# Patient Record
Sex: FEMALE | Race: WHITE | NOT HISPANIC OR LATINO | Employment: FULL TIME | URBAN - METROPOLITAN AREA
[De-identification: names, ages, dates, MRNs, and addresses within clinical notes are randomized per-mention and may not be internally consistent; named-entity substitution may affect disease eponyms.]

---

## 2018-01-22 ENCOUNTER — OFFICE VISIT (OUTPATIENT)
Dept: FAMILY MEDICINE | Facility: CLINIC | Age: 53
End: 2018-01-22
Payer: COMMERCIAL

## 2018-01-22 VITALS
OXYGEN SATURATION: 98 % | DIASTOLIC BLOOD PRESSURE: 90 MMHG | HEART RATE: 84 BPM | SYSTOLIC BLOOD PRESSURE: 142 MMHG | BODY MASS INDEX: 43.39 KG/M2 | WEIGHT: 270 LBS | HEIGHT: 66 IN

## 2018-01-22 DIAGNOSIS — L71.9 ROSACEA: ICD-10-CM

## 2018-01-22 DIAGNOSIS — R09.89 BRUIT OF RIGHT CAROTID ARTERY: ICD-10-CM

## 2018-01-22 DIAGNOSIS — M17.0 PRIMARY OSTEOARTHRITIS OF BOTH KNEES: Primary | ICD-10-CM

## 2018-01-22 DIAGNOSIS — I10 ESSENTIAL HYPERTENSION: ICD-10-CM

## 2018-01-22 PROCEDURE — 99203 OFFICE O/P NEW LOW 30 MIN: CPT | Mod: ,,, | Performed by: NURSE PRACTITIONER

## 2018-01-22 RX ORDER — NAPROXEN 500 MG/1
500 TABLET ORAL 3 TIMES DAILY PRN
COMMUNITY
End: 2018-06-20

## 2018-01-22 RX ORDER — DICLOFENAC SODIUM 10 MG/G
4 GEL TOPICAL 4 TIMES DAILY
Qty: 100 G | Refills: 0 | Status: SHIPPED | OUTPATIENT
Start: 2018-01-22 | End: 2023-03-21

## 2018-01-22 RX ORDER — METRONIDAZOLE 7.5 MG/G
GEL TOPICAL 2 TIMES DAILY
Qty: 1 TUBE | Refills: 0 | Status: CANCELLED | OUTPATIENT
Start: 2018-01-22

## 2018-01-22 RX ORDER — LISINOPRIL 10 MG/1
10 TABLET ORAL NIGHTLY
Qty: 30 TABLET | Refills: 1 | Status: SHIPPED | OUTPATIENT
Start: 2018-01-22 | End: 2018-08-06 | Stop reason: SDUPTHER

## 2018-01-22 RX ORDER — METRONIDAZOLE 10 MG/G
GEL TOPICAL DAILY
Qty: 1 TUBE | Refills: 0 | Status: SHIPPED | OUTPATIENT
Start: 2018-01-22 | End: 2023-03-21

## 2018-01-22 NOTE — PROGRESS NOTES
SUBJECTIVE:   HPI:  Knee Pain (wants referral to ortho, having knee and leg problems.)    Chronic bilat knee pain related to osteoarthritis. Previously treated per Dr Olson.   BP elevated, patient states it has been elevated at home in the past.   Chronic rosacea: not having a flare up. Would like medication to help with daily management.       Knee Pain    There was no injury mechanism. The pain is present in the right knee and left knee. The quality of the pain is described as aching. The pain has been constant since onset.   Hypertension   This is a new problem. The current episode started 1 to 4 weeks ago. The problem is unchanged. Pertinent negatives include no chest pain, palpitations or shortness of breath.       (Not in a hospital admission)  Review of patient's allergies indicates:  No Known Allergies  No current outpatient prescriptions on file prior to visit.     No current facility-administered medications on file prior to visit.      History reviewed. No pertinent past medical history.  History reviewed. No pertinent surgical history.  Family History   Problem Relation Age of Onset    Cancer Mother      breast    Arthritis Mother     Birth defects Mother     Cancer Father      lung    Arthritis Maternal Grandmother      Social History   Substance Use Topics    Smoking status: Never Smoker    Smokeless tobacco: Never Used    Alcohol use No      The patient has no Health Maintenance topics of status Not Due    There is no immunization history on file for this patient.    Review of Systems   Constitutional: Negative for appetite change, chills, diaphoresis and unexpected weight change.   HENT: Negative for ear discharge, hearing loss, trouble swallowing and voice change.    Eyes: Negative for photophobia and pain.   Respiratory: Negative for cough, chest tightness, shortness of breath and stridor.    Cardiovascular: Negative for chest pain, palpitations and leg swelling.   Gastrointestinal:  "Negative for blood in stool and vomiting.   Endocrine: Negative for cold intolerance and heat intolerance.   Genitourinary: Negative for difficulty urinating and flank pain.   Musculoskeletal: Positive for arthralgias. Negative for joint swelling and neck stiffness.   Skin: Negative for pallor.   Neurological: Negative for speech difficulty.   Hematological: Does not bruise/bleed easily.   Psychiatric/Behavioral: Negative for confusion.      OBJECTIVE:      Vitals:    01/22/18 1401 01/22/18 1510   BP: (!) 174/102 (!) 142/90   Pulse: 84    SpO2: 98%    Weight: 122.5 kg (270 lb)    Height: 5' 6" (1.676 m)      Physical Exam   Constitutional: She is oriented to person, place, and time. She appears well-developed and well-nourished. She is cooperative. No distress.   HENT:   Head: Normocephalic and atraumatic.   Right Ear: Tympanic membrane normal.   Left Ear: Tympanic membrane normal.   Nose: Nose normal.   Mouth/Throat: Uvula is midline, oropharynx is clear and moist and mucous membranes are normal.   Eyes: Conjunctivae, EOM and lids are normal. Pupils are equal, round, and reactive to light. Right pupil is round and reactive. Left pupil is round and reactive.   Neck: Trachea normal and normal range of motion. Neck supple. No JVD present. Carotid bruit is present (right carotid bruit). No thyromegaly present.   Cardiovascular: Normal rate, regular rhythm, normal heart sounds and intact distal pulses.    Pulmonary/Chest: Effort normal and breath sounds normal. No tachypnea. No respiratory distress. She has no wheezes. She has no rhonchi. She has no rales.   Abdominal: Soft. Bowel sounds are normal. There is no tenderness.   Musculoskeletal: Normal range of motion.   Bilat knee crepitus noted, no effusion   Lymphadenopathy:     She has no cervical adenopathy.   Neurological: She is alert and oriented to person, place, and time. She has normal strength.   Skin: Skin is warm and dry. No rash noted.   Forehead with non " inflamed acne noted. Minimal erythema noted to nose and bilat cheeks   Psychiatric: She has a normal mood and affect. Her speech is normal and behavior is normal.   Nursing note and vitals reviewed.     Assessment:       1. Primary osteoarthritis of both knees    2. Essential hypertension    3. Bruit of right carotid artery    4. Rosacea        Plan:       Primary osteoarthritis of both knees  -     Ambulatory referral to Orthopedics  -     diclofenac sodium (VOLTAREN) 1 % Gel; Apply 4 g topically 4 (four) times daily.  Dispense: 100 g; Refill: 0    Essential hypertension  -     lisinopril 10 MG tablet; Take 1 tablet (10 mg total) by mouth every evening.  Dispense: 30 tablet; Refill: 1    Bruit of right carotid artery  -     Cardiology Lab Carotid US Bilateral; Future    Rosacea  -     metronidazole 1% (METROGEL) 1 % Gel; Apply topically once daily.  Dispense: 1 Tube; Refill: 0        Follow-up in about 4 weeks (around 2/19/2018) for htn.      1/22/2018 KEVIN Mcdonald, FNP

## 2018-01-22 NOTE — PATIENT INSTRUCTIONS
Controlling High Blood Pressure  High blood pressure (hypertension) is often called the silent killer. This is because many people who have it dont know it. High blood pressure is defined as 140/90 mm Hg or higher. Know your blood pressure and remember to check it regularly. Doing so can save your life. Here are some things you can do to help control your blood pressure.    Choose heart-healthy foods  · Select low-salt, low-fat foods. Limit sodium intake to 2,400 mg per day or the amount suggested by your healthcare provider.  · Limit canned, dried, cured, packaged, and fast foods. These can contain a lot of salt.  · Eat 8 to 10 servings of fruits and vegetables every day.  · Choose lean meats, fish, or chicken.  · Eat whole-grain pasta, brown rice, and beans.  · Eat 2 to 3 servings of low-fat or fat-free dairy products.  · Ask your doctor about the DASH eating plan. This plan helps reduce blood pressure.  · When you go to a restaurant, ask that your meal be prepared with no added salt.  Maintain a healthy weight  · Ask your healthcare provider how many calories to eat a day. Then stick to that number.  · Ask your healthcare provider what weight range is healthiest for you. If you are overweight, a weight loss of only 3% to 5% of your body weight can help lower blood pressure. Generally, a good weight loss goal is to lose 10% of your body weight in a year.  · Limit snacks and sweets.  · Get regular exercise.  Get up and get active  · Choose activities you enjoy. Find ones you can do with friends or family. This includes bicycling, dancing, walking, and jogging.  · Park farther away from building entrances.  · Use stairs instead of the elevator.  · When you can, walk or bike instead of driving.  · [place] leaves, garden, or do household repairs.  · Be active at a moderate to vigorous level of physical activity for at least 40 minutes for a minimum of 3 to 4 days a week.   Manage stress  · Make time to relax and enjoy  life. Find time to laugh.  · Communicate your concerns with your loved ones and your healthcare provider.  · Visit with family and friends, and keep up with hobbies.  Limit alcohol and quit smoking  · Men should have no more than 2 drinks per day.  · Women should have no more than 1 drink per day.  · Talk with your healthcare provider about quitting smoking. Smoking significantly increases your risk for heart disease and stroke. Ask your healthcare provider about community smoking cessation programs and other options.  Medicines  If lifestyle changes arent enough, your healthcare provider may prescribe high blood pressure medicine. Take all medicines as prescribed. If you have any questions about your medicines, ask your healthcare provider before stopping or changing them.   Date Last Reviewed: 4/27/2016  © 8248-1549 [employer]. [address] [place] [address], [place] [address]. All rights reserved. This information is not intended as a substitute for professional medical care. Always follow your healthcare professional's instructions.

## 2018-02-08 ENCOUNTER — TELEPHONE (OUTPATIENT)
Dept: FAMILY MEDICINE | Facility: CLINIC | Age: 53
End: 2018-02-08

## 2018-02-08 NOTE — TELEPHONE ENCOUNTER
----- Message from Prashanth Cabrera NP sent at 2/8/2018  7:56 AM CST -----  Neg carotid u/s  ----- Message -----  From: Yennifer Apodaca  Sent: 2/8/2018   7:49 AM  To: Prashanth Cabrera NP    Carotid doppler 2/7/18

## 2018-02-12 ENCOUNTER — TELEPHONE (OUTPATIENT)
Dept: FAMILY MEDICINE | Facility: CLINIC | Age: 53
End: 2018-02-12

## 2018-04-04 ENCOUNTER — ANESTHESIA EVENT (OUTPATIENT)
Dept: SURGERY | Facility: HOSPITAL | Age: 53
DRG: 470 | End: 2018-04-04
Payer: COMMERCIAL

## 2018-04-05 ENCOUNTER — HOSPITAL ENCOUNTER (OUTPATIENT)
Dept: RADIOLOGY | Facility: HOSPITAL | Age: 53
Discharge: HOME | End: 2018-04-05
Attending: ORTHOPAEDIC SURGERY
Payer: COMMERCIAL

## 2018-04-05 ENCOUNTER — HOSPITAL ENCOUNTER (OUTPATIENT)
Dept: PREADMISSION TESTING | Facility: HOSPITAL | Age: 53
Discharge: HOME | End: 2018-04-05
Attending: ORTHOPAEDIC SURGERY
Payer: COMMERCIAL

## 2018-04-05 VITALS — BODY MASS INDEX: 43.82 KG/M2 | HEIGHT: 65 IN | WEIGHT: 263 LBS

## 2018-04-05 DIAGNOSIS — Z01.818 PRE-OP EXAM: ICD-10-CM

## 2018-04-05 DIAGNOSIS — M17.12 OSTEOARTHRITIS OF LEFT KNEE, UNSPECIFIED OSTEOARTHRITIS TYPE: Primary | ICD-10-CM

## 2018-04-05 LAB
ABO + RH BLD: NORMAL
ALBUMIN SERPL BCP-MCNC: 3.9 G/DL
ALP SERPL-CCNC: 37 U/L
ALT SERPL W/O P-5'-P-CCNC: 11 U/L
ANION GAP SERPL CALC-SCNC: 9 MMOL/L
APTT BLDCRRT: 24.5 SEC
AST SERPL-CCNC: 14 U/L
BACTERIA #/AREA URNS HPF: ABNORMAL /HPF
BASOPHILS # BLD AUTO: 0.1 K/UL
BASOPHILS NFR BLD: 0.9 %
BILIRUB SERPL-MCNC: 0.3 MG/DL
BILIRUB UR QL STRIP: NEGATIVE
BLD GP AB SCN CELLS X3 SERPL QL: NORMAL
BUN SERPL-MCNC: 14 MG/DL
CALCIUM SERPL-MCNC: 9.8 MG/DL
CHLORIDE SERPL-SCNC: 106 MMOL/L
CLARITY UR: CLEAR
CO2 SERPL-SCNC: 25 MMOL/L
COLOR UR: YELLOW
CREAT SERPL-MCNC: 0.9 MG/DL
DIFFERENTIAL METHOD: ABNORMAL
EOSINOPHIL # BLD AUTO: 0.2 K/UL
EOSINOPHIL NFR BLD: 2 %
ERYTHROCYTE [DISTWIDTH] IN BLOOD BY AUTOMATED COUNT: 14.3 %
EST. GFR  (AFRICAN AMERICAN): >60 ML/MIN/1.73 M^2
EST. GFR  (NON AFRICAN AMERICAN): >60 ML/MIN/1.73 M^2
GLUCOSE SERPL-MCNC: 101 MG/DL
GLUCOSE UR QL STRIP: NEGATIVE
HCT VFR BLD AUTO: 44.2 %
HGB BLD-MCNC: 14.6 G/DL
HGB UR QL STRIP: ABNORMAL
KETONES UR QL STRIP: NEGATIVE
LEUKOCYTE ESTERASE UR QL STRIP: ABNORMAL
LYMPHOCYTES # BLD AUTO: 3.5 K/UL
LYMPHOCYTES NFR BLD: 31.1 %
MCH RBC QN AUTO: 26.9 PG
MCHC RBC AUTO-ENTMCNC: 32.9 G/DL
MCV RBC AUTO: 82 FL
MICROSCOPIC COMMENT: ABNORMAL
MONOCYTES # BLD AUTO: 0.7 K/UL
MONOCYTES NFR BLD: 5.9 %
NEUTROPHILS # BLD AUTO: 6.7 K/UL
NEUTROPHILS NFR BLD: 60.1 %
NITRITE UR QL STRIP: NEGATIVE
PH UR STRIP: 6 [PH] (ref 5–8)
PLATELET # BLD AUTO: 384 K/UL
PMV BLD AUTO: 7.9 FL
POTASSIUM SERPL-SCNC: 3.8 MMOL/L
PROT SERPL-MCNC: 7.7 G/DL
PROT UR QL STRIP: ABNORMAL
RBC # BLD AUTO: 5.41 M/UL
RBC #/AREA URNS HPF: 5 /HPF (ref 0–4)
SODIUM SERPL-SCNC: 140 MMOL/L
SP GR UR STRIP: >=1.03 (ref 1–1.03)
SQUAMOUS #/AREA URNS HPF: 8 /HPF
URN SPEC COLLECT METH UR: ABNORMAL
UROBILINOGEN UR STRIP-ACNC: NEGATIVE EU/DL
WBC # BLD AUTO: 11.1 K/UL
WBC #/AREA URNS HPF: 75 /HPF (ref 0–5)

## 2018-04-05 PROCEDURE — 71046 X-RAY EXAM CHEST 2 VIEWS: CPT | Mod: 26,,, | Performed by: RADIOLOGY

## 2018-04-05 PROCEDURE — 86901 BLOOD TYPING SEROLOGIC RH(D): CPT

## 2018-04-05 PROCEDURE — 93010 ELECTROCARDIOGRAM REPORT: CPT | Mod: ,,, | Performed by: INTERNAL MEDICINE

## 2018-04-05 PROCEDURE — 99900103 DSU ONLY-NO CHARGE-INITIAL HR (STAT)

## 2018-04-05 PROCEDURE — 99900104 DSU ONLY-NO CHARGE-EA ADD'L HR (STAT)

## 2018-04-05 PROCEDURE — 36415 COLL VENOUS BLD VENIPUNCTURE: CPT

## 2018-04-05 PROCEDURE — 85025 COMPLETE CBC W/AUTO DIFF WBC: CPT

## 2018-04-05 PROCEDURE — 85730 THROMBOPLASTIN TIME PARTIAL: CPT

## 2018-04-05 PROCEDURE — 73562 X-RAY EXAM OF KNEE 3: CPT | Mod: 26,LT,, | Performed by: RADIOLOGY

## 2018-04-05 PROCEDURE — 81000 URINALYSIS NONAUTO W/SCOPE: CPT

## 2018-04-05 PROCEDURE — 87081 CULTURE SCREEN ONLY: CPT

## 2018-04-05 PROCEDURE — 80053 COMPREHEN METABOLIC PANEL: CPT

## 2018-04-05 PROCEDURE — 93005 ELECTROCARDIOGRAM TRACING: CPT

## 2018-04-05 PROCEDURE — 73562 X-RAY EXAM OF KNEE 3: CPT | Mod: TC,FY,LT

## 2018-04-05 PROCEDURE — 71046 X-RAY EXAM CHEST 2 VIEWS: CPT | Mod: TC,FY

## 2018-04-05 NOTE — PRE ADMISSION SCREENING
"               [facility] Risk Assessment Scale    Patient Name: Carola Blanco  YOB: 1965  MRN: [id number]            RIsk Factor Measure Recommendation Patient Data Scale/Score   BMI >40 Reconsider surgery, weight loss   Estimated body mass index is 43.77 kg/m² as calculated from the following:    Height as of this encounter: 5' 5" (1.651 m).    Weight as of this encounter: 119.3 kg (263 lb).   [] 0 = 1 - 24.9  [] 1 = 25-29.9  [] 2 = 30-34.9  [] 3 = 35-39.9  [x] 4 = 40-44.9  [] 5 = 45-99.9   Hemoglobin AIC (if applicable) >9 Delay surgery until DM under control  Refer for:  · Nutrition Therapy  · Exercise   · Medication    No results found for: LABA1C, HGBA1C    Lab Results   Component Value Date     04/05/2018      [] 0 = 4.0-5.6  [] 1 = 5.7-6.4  [] 2 = 6.5-6.9  [] 3 = 7.0-7.9  [] 4 = 8.0-8.9  [] 5 = 9.0-12   Hemoglobin (Anemia) <9 Delay surgery   Correct anemia Lab Results   Component Value Date    HGB 14.6 04/05/2018    [] 20 - <9.0                    Albumin <3 Delay surgery &Workup Lab Results   Component Value Date    ALBUMIN 3.9 04/05/2018    [] 20 - <3.0   Smoking Cessation >4 Weeks Delay Surgery  Refer to OP Cessation Class    Never Smoker [] 20 - current smoker                                _____ PPD                    Hx of MI, PE, Arrhythmia, CVA, DVT <30 Days Delay Surgery    N/A [] 20      Infection Variable Delay surgery and re-evaluate   N/A [] 20 - recent/current infection     Depression (PHQ) >10 out of 27 Delay Surgery and re-evaluate  Medication  Counseling              [x] 0     []1     []2     []3      []4      [] 5                    (1-4)      (5-9)  (10-14)  (15-19)   (20-27)     Memory Impairment & Memory loss (Mini-Cog Screening Tool) Advanced dementia and/or Parkinson's Reconsider surgery     [x] 0     []1     []2     []3     []4     [] 5     Physical Conditioning (Modified AM-PAC Per Physical Therapy at [facility]) Unable to ambulate on day of surgery Delay surgery and " re-evaluate  Pre-Rehabilitation   (PT evaluation)       [x]  0   []4       []8     []12        []16     []20       (<20%)   (<40%)   (<60%)   (<80% )    (>80%)     Home Environment/Caregiver support  (Per /Navigator Interview)    Availability of basic services and/or approprate assistance during post-operative period Delay surgery and re-evaluate  Safe home environment  Health   1 week post-surgery  Transportation  availability  Ability to obtain DME/Medications post-op    [x] 0     []1     []2     []3     []4     [] 5  [x] 0     []1     []2     []3     []4     [] 5  [x] 0     []1     []2     []3     []4     [] 5  [x] 0     []1     []2     []3     []4     [] 5         MD Contact: Dr. Bang Comments:  Total Score:  4

## 2018-04-05 NOTE — PRE ADMISSION SCREENING
JOINT CAMP ASSESSMENT    Name Carola Blanco   MRN [id number]    Age/Sex 52 y.o. female    Surgeon Dr. Markie Bang   Joint Camp Date 4/5/2018   Surgery Date 4/19/2018   Procedure Left Knee Arthroplasty   Insurance Payor: [facility] / Plan: [facility] [place] HMO / Product Type: HMO /    Care Team Patient Care Team:  Prashanth Cabrera NP as PCP - General (Family Medicine)    Pharmacy   [facility] [address] [place] [id number] [employer]  [address] [facility] [address]  Phone: [phone] Fax: [phone]     AM-PAC Score   23   Risk Assessment Score 4     Past Medical History:   Diagnosis Date    Arthritis     Hypertension        History reviewed. No pertinent surgical history.      Home Enviroment     Living Arrangement: Lives with spouse  Home Environment: 2-story house, number of outside stairs: 1, number of inside stairs: 14, bedroom on 2nd floor, bathroom on 2nd floor, walk-in shower  Home Safety Concerns: Pets in the home: cats (1).    DISCHARGE CAREGIVER/SUPPORT SYSTEM     Identified post-op caregiver: Patient has spouse / significant other.  Patient's caregiver(s) will be able to provide physical assistance. Patient will have someone to assist overnight.      Caregiver present at pre-op interview:  No      PRE-OPERATIVE FUNCTIONAL STATUS     Employment: Employed full time    Pre-op Functional Status: Patient is independent with mobility/ambulation, transfers, ADL's, IADL's.    Use of assistive device for ambulation: none  ADL: self care  ADL Limitations: difficulty with walking  Medical Restrictions: Decreased range of motions in extremities    POTENTIAL BARRIERS TO DISCHARGE/POTENTIAL POST-OP COMPLICATIONS     No pertinent medical HX. Patient does have 14 steps to get to bedroom and bathroom. Stair training needed from post op Day 1.      DISCHARGE PLAN     Expected LOS of 2 days or less for joint replacement discussed with patient. We also discussed a discharge path of  for approximately two  weeks with a transition to outpatient PT on the third week given no post-op complications.      Patient in agreement with discharge plan: Yes    Discharge to: Discharge home with home heidi (PT/OT) x2 weeks with transition to outpatient PT     HH: [facility]      OP PT: [facility]     Home DME: rolling walker and shower chair     Needed DME at D/C: bedside commode     Rx: Per Dr. Bang at discharge.     Meds to Beds: N/A  Patient expected to discharge on Aspirin 325mg by mouth twice daily for DVT prophylaxis.

## 2018-04-05 NOTE — PRE ADMISSION SCREENING
Patient Name: Carola Blanco  YOB: 1965   MRN: [id number]     [facility]   Basic Mobility Inpatient Short Form 6 Clicks         How much difficulty does the patient currently have  Unable  A Lot  A Little  None      1. Turning over in bed (including adjusting bedclothes, sheets and blankets)?     1 []    2 []    3 [x]    4 []        2. Sitting down on and standing up from a chair with arms (e.g., wheelchair, bedside commode, etc.)     1 []  2 []  3 []     4 [x]      3. Moving from lying on back to sitting on the side of the bed?     1 []  2 []  3 []    4 [x]    How much help from another person does the patient currently need  Total  A Lot  A Little  None      4. Moving to and from a bed to a chair (including a wheelchair)?    1 []  2 []  3 []    4 [x]      5. Need to walk in hospital room?    1 []  2 []  3 []    4 [x]      6. Climbing 3-5 steps with a railing?    1 []  2 []  3 []    4 [x]       Raw Score:    23              CMS 0-100% Score:  11.20          %   Standardized Score:   56.93            CMS Modifier:     CI                                     [facility]   Basic Mobility Inpatient Short Form 6 Clicks Score Conversion Table*         *Use this form to convert -PAC Basic Mobility Inpatient Raw Scores.   [facility] Inpatient Basic Mobility Short Form Scoring Example   1. Add the number values associated with the response to each item. For example, items totals yield a Raw Score of 21.   2. Match the raw score to the t-Scale scores (t-Scale score = 50.25, SE = 4.69).   3. Find the associated CMS % (CMS % = 28.97%).   4. Locate the correct CMS Functional Modifier Code, or G Code (G code = CJ)     NOTE: Each -PAC Short Form has a separate conversion table. Make sure that you use the correct conversion table.       Instruction Manual - page 45 contains conversion table

## 2018-04-07 LAB — MRSA SPEC QL CULT: NORMAL

## 2018-04-16 DIAGNOSIS — N39.0 URINARY TRACT INFECTION WITHOUT HEMATURIA, SITE UNSPECIFIED: Primary | ICD-10-CM

## 2018-04-19 ENCOUNTER — HOSPITAL ENCOUNTER (INPATIENT)
Facility: HOSPITAL | Age: 53
LOS: 2 days | Discharge: HOME HEALTH | DRG: 470 | End: 2018-04-21
Attending: ORTHOPAEDIC SURGERY | Admitting: INTERNAL MEDICINE
Payer: COMMERCIAL

## 2018-04-19 ENCOUNTER — ANESTHESIA (OUTPATIENT)
Dept: SURGERY | Facility: HOSPITAL | Age: 53
DRG: 470 | End: 2018-04-19
Payer: COMMERCIAL

## 2018-04-19 DIAGNOSIS — M17.12 PRIMARY OSTEOARTHRITIS OF LEFT KNEE: Primary | ICD-10-CM

## 2018-04-19 DIAGNOSIS — Z96.652 S/P TOTAL KNEE ARTHROPLASTY, LEFT: ICD-10-CM

## 2018-04-19 PROBLEM — I10 HYPERTENSION: Status: ACTIVE | Noted: 2018-04-19

## 2018-04-19 LAB
ANION GAP SERPL CALC-SCNC: 8 MMOL/L
BASOPHILS # BLD AUTO: 0 K/UL
BASOPHILS NFR BLD: 0.2 %
BILIRUB UR QL STRIP: NEGATIVE
BUN SERPL-MCNC: 17 MG/DL
CALCIUM SERPL-MCNC: 8.6 MG/DL
CHLORIDE SERPL-SCNC: 105 MMOL/L
CLARITY UR: CLEAR
CO2 SERPL-SCNC: 23 MMOL/L
COLOR UR: YELLOW
CREAT SERPL-MCNC: 1.3 MG/DL
DIFFERENTIAL METHOD: ABNORMAL
EOSINOPHIL # BLD AUTO: 0.1 K/UL
EOSINOPHIL NFR BLD: 0.7 %
ERYTHROCYTE [DISTWIDTH] IN BLOOD BY AUTOMATED COUNT: 14.3 %
EST. GFR  (AFRICAN AMERICAN): 55 ML/MIN/1.73 M^2
EST. GFR  (NON AFRICAN AMERICAN): 48 ML/MIN/1.73 M^2
GLUCOSE SERPL-MCNC: 118 MG/DL
GLUCOSE UR QL STRIP: NEGATIVE
HCT VFR BLD AUTO: 38.6 %
HGB BLD-MCNC: 12.8 G/DL
HGB UR QL STRIP: ABNORMAL
INR PPP: 1
KETONES UR QL STRIP: NEGATIVE
LEUKOCYTE ESTERASE UR QL STRIP: NEGATIVE
LYMPHOCYTES # BLD AUTO: 1.3 K/UL
LYMPHOCYTES NFR BLD: 11.8 %
MCH RBC QN AUTO: 26.9 PG
MCHC RBC AUTO-ENTMCNC: 33 G/DL
MCV RBC AUTO: 81 FL
MONOCYTES # BLD AUTO: 0 K/UL
MONOCYTES NFR BLD: 0.4 %
NEUTROPHILS # BLD AUTO: 9.8 K/UL
NEUTROPHILS NFR BLD: 86.9 %
NITRITE UR QL STRIP: NEGATIVE
PH UR STRIP: 6 [PH] (ref 5–8)
PLATELET # BLD AUTO: 340 K/UL
PMV BLD AUTO: 8 FL
POTASSIUM SERPL-SCNC: 5.3 MMOL/L
PROT UR QL STRIP: NEGATIVE
PROTHROMBIN TIME: 10.3 SEC
RBC # BLD AUTO: 4.75 M/UL
SODIUM SERPL-SCNC: 136 MMOL/L
SP GR UR STRIP: >=1.03 (ref 1–1.03)
URN SPEC COLLECT METH UR: ABNORMAL
UROBILINOGEN UR STRIP-ACNC: NEGATIVE EU/DL
WBC # BLD AUTO: 11.3 K/UL

## 2018-04-19 PROCEDURE — 71000033 HC RECOVERY, INTIAL HOUR: Performed by: ORTHOPAEDIC SURGERY

## 2018-04-19 PROCEDURE — 63600175 PHARM REV CODE 636 W HCPCS: Performed by: NURSE ANESTHETIST, CERTIFIED REGISTERED

## 2018-04-19 PROCEDURE — 37000009 HC ANESTHESIA EA ADD 15 MINS: Performed by: ORTHOPAEDIC SURGERY

## 2018-04-19 PROCEDURE — 99222 1ST HOSP IP/OBS MODERATE 55: CPT | Mod: ,,, | Performed by: INTERNAL MEDICINE

## 2018-04-19 PROCEDURE — 64448 NJX AA&/STRD FEM NRV NFS IMG: CPT | Performed by: ANESTHESIOLOGY

## 2018-04-19 PROCEDURE — 63600175 PHARM REV CODE 636 W HCPCS: Performed by: ANESTHESIOLOGY

## 2018-04-19 PROCEDURE — 63600175 PHARM REV CODE 636 W HCPCS: Performed by: ORTHOPAEDIC SURGERY

## 2018-04-19 PROCEDURE — 99900104 DSU ONLY-NO CHARGE-EA ADD'L HR (STAT): Performed by: ORTHOPAEDIC SURGERY

## 2018-04-19 PROCEDURE — 85610 PROTHROMBIN TIME: CPT

## 2018-04-19 PROCEDURE — 80048 BASIC METABOLIC PNL TOTAL CA: CPT

## 2018-04-19 PROCEDURE — C2626 INFUSION PUMP, NON-PROG,TEMP: HCPCS | Performed by: ANESTHESIOLOGY

## 2018-04-19 PROCEDURE — 63600175 PHARM REV CODE 636 W HCPCS

## 2018-04-19 PROCEDURE — 63600175 PHARM REV CODE 636 W HCPCS: Performed by: PHYSICIAN ASSISTANT

## 2018-04-19 PROCEDURE — 27201423 OPTIME MED/SURG SUP & DEVICES STERILE SUPPLY: Performed by: ORTHOPAEDIC SURGERY

## 2018-04-19 PROCEDURE — 25000003 PHARM REV CODE 250: Performed by: ANESTHESIOLOGY

## 2018-04-19 PROCEDURE — 25000003 PHARM REV CODE 250: Performed by: NURSE ANESTHETIST, CERTIFIED REGISTERED

## 2018-04-19 PROCEDURE — 85025 COMPLETE CBC W/AUTO DIFF WBC: CPT

## 2018-04-19 PROCEDURE — 36000710: Performed by: ORTHOPAEDIC SURGERY

## 2018-04-19 PROCEDURE — C1713 ANCHOR/SCREW BN/BN,TIS/BN: HCPCS | Performed by: ORTHOPAEDIC SURGERY

## 2018-04-19 PROCEDURE — 0SRD0J9 REPLACEMENT OF LEFT KNEE JOINT WITH SYNTHETIC SUBSTITUTE, CEMENTED, OPEN APPROACH: ICD-10-PCS | Performed by: ORTHOPAEDIC SURGERY

## 2018-04-19 PROCEDURE — 94761 N-INVAS EAR/PLS OXIMETRY MLT: CPT

## 2018-04-19 PROCEDURE — 81003 URINALYSIS AUTO W/O SCOPE: CPT

## 2018-04-19 PROCEDURE — 94799 UNLISTED PULMONARY SVC/PX: CPT

## 2018-04-19 PROCEDURE — 76942 ECHO GUIDE FOR BIOPSY: CPT | Mod: 26,,, | Performed by: ANESTHESIOLOGY

## 2018-04-19 PROCEDURE — 25000003 PHARM REV CODE 250: Performed by: PHYSICIAN ASSISTANT

## 2018-04-19 PROCEDURE — 12000002 HC ACUTE/MED SURGE SEMI-PRIVATE ROOM

## 2018-04-19 PROCEDURE — 76942 ECHO GUIDE FOR BIOPSY: CPT | Performed by: ANESTHESIOLOGY

## 2018-04-19 PROCEDURE — 36415 COLL VENOUS BLD VENIPUNCTURE: CPT

## 2018-04-19 PROCEDURE — 25000003 PHARM REV CODE 250: Performed by: ORTHOPAEDIC SURGERY

## 2018-04-19 PROCEDURE — 71000039 HC RECOVERY, EACH ADD'L HOUR: Performed by: ORTHOPAEDIC SURGERY

## 2018-04-19 PROCEDURE — 99900035 HC TECH TIME PER 15 MIN (STAT)

## 2018-04-19 PROCEDURE — 36000711: Performed by: ORTHOPAEDIC SURGERY

## 2018-04-19 PROCEDURE — 97116 GAIT TRAINING THERAPY: CPT

## 2018-04-19 PROCEDURE — D9220A PRA ANESTHESIA: Mod: CRNA,,, | Performed by: NURSE ANESTHETIST, CERTIFIED REGISTERED

## 2018-04-19 PROCEDURE — C1729 CATH, DRAINAGE: HCPCS | Performed by: ORTHOPAEDIC SURGERY

## 2018-04-19 PROCEDURE — 99900103 DSU ONLY-NO CHARGE-INITIAL HR (STAT): Performed by: ORTHOPAEDIC SURGERY

## 2018-04-19 PROCEDURE — D9220A PRA ANESTHESIA: Mod: ANES,,, | Performed by: ANESTHESIOLOGY

## 2018-04-19 PROCEDURE — C1776 JOINT DEVICE (IMPLANTABLE): HCPCS | Performed by: ORTHOPAEDIC SURGERY

## 2018-04-19 PROCEDURE — 64448 NJX AA&/STRD FEM NRV NFS IMG: CPT | Mod: 59,LT,, | Performed by: ANESTHESIOLOGY

## 2018-04-19 PROCEDURE — 97161 PT EVAL LOW COMPLEX 20 MIN: CPT

## 2018-04-19 PROCEDURE — 37000008 HC ANESTHESIA 1ST 15 MINUTES: Performed by: ORTHOPAEDIC SURGERY

## 2018-04-19 DEVICE — IMPLANTABLE DEVICE: Type: IMPLANTABLE DEVICE | Site: KNEE | Status: FUNCTIONAL

## 2018-04-19 DEVICE — TIBIAL STEM SZ 3.: Type: IMPLANTABLE DEVICE | Site: KNEE | Status: FUNCTIONAL

## 2018-04-19 DEVICE — PATELLA OVAL DOME 35MM: Type: IMPLANTABLE DEVICE | Site: KNEE | Status: FUNCTIONAL

## 2018-04-19 RX ORDER — CELECOXIB 100 MG/1
400 CAPSULE ORAL ONCE
Status: DISCONTINUED | OUTPATIENT
Start: 2018-04-19 | End: 2018-04-21 | Stop reason: HOSPADM

## 2018-04-19 RX ORDER — LISINOPRIL 10 MG/1
10 TABLET ORAL NIGHTLY
Status: DISCONTINUED | OUTPATIENT
Start: 2018-04-19 | End: 2018-04-21 | Stop reason: HOSPADM

## 2018-04-19 RX ORDER — POLYETHYLENE GLYCOL 3350 17 G/17G
17 POWDER, FOR SOLUTION ORAL DAILY
Status: DISCONTINUED | OUTPATIENT
Start: 2018-04-19 | End: 2018-04-19 | Stop reason: SDUPTHER

## 2018-04-19 RX ORDER — TRANEXAMIC ACID 100 MG/ML
INJECTION, SOLUTION INTRAVENOUS
Status: DISCONTINUED | OUTPATIENT
Start: 2018-04-19 | End: 2018-04-19

## 2018-04-19 RX ORDER — RAMELTEON 8 MG/1
8 TABLET ORAL NIGHTLY PRN
Status: DISCONTINUED | OUTPATIENT
Start: 2018-04-19 | End: 2018-04-21 | Stop reason: HOSPADM

## 2018-04-19 RX ORDER — OXYCODONE HYDROCHLORIDE 10 MG/1
10 TABLET ORAL
Status: DISCONTINUED | OUTPATIENT
Start: 2018-04-19 | End: 2018-04-21 | Stop reason: HOSPADM

## 2018-04-19 RX ORDER — CEFAZOLIN SODIUM 2 G/50ML
2 SOLUTION INTRAVENOUS
Status: COMPLETED | OUTPATIENT
Start: 2018-04-19 | End: 2018-04-20

## 2018-04-19 RX ORDER — ONDANSETRON 4 MG/1
8 TABLET, ORALLY DISINTEGRATING ORAL EVERY 8 HOURS PRN
Status: DISCONTINUED | OUTPATIENT
Start: 2018-04-19 | End: 2018-04-21 | Stop reason: HOSPADM

## 2018-04-19 RX ORDER — OXYCODONE HYDROCHLORIDE 5 MG/1
5 TABLET ORAL
Status: DISCONTINUED | OUTPATIENT
Start: 2018-04-19 | End: 2018-04-21 | Stop reason: HOSPADM

## 2018-04-19 RX ORDER — FENTANYL CITRATE 50 UG/ML
INJECTION, SOLUTION INTRAMUSCULAR; INTRAVENOUS
Status: DISCONTINUED | OUTPATIENT
Start: 2018-04-19 | End: 2018-04-19

## 2018-04-19 RX ORDER — LOPERAMIDE HYDROCHLORIDE 2 MG/1
2 CAPSULE ORAL CONTINUOUS PRN
Status: DISCONTINUED | OUTPATIENT
Start: 2018-04-19 | End: 2018-04-21 | Stop reason: HOSPADM

## 2018-04-19 RX ORDER — MUPIROCIN 20 MG/G
1 OINTMENT TOPICAL 2 TIMES DAILY
Status: DISCONTINUED | OUTPATIENT
Start: 2018-04-19 | End: 2018-04-21 | Stop reason: HOSPADM

## 2018-04-19 RX ORDER — MEPERIDINE HYDROCHLORIDE 25 MG/ML
12.5 INJECTION INTRAMUSCULAR; INTRAVENOUS; SUBCUTANEOUS ONCE AS NEEDED
Status: DISCONTINUED | OUTPATIENT
Start: 2018-04-19 | End: 2018-04-19 | Stop reason: HOSPADM

## 2018-04-19 RX ORDER — ACETAMINOPHEN 325 MG/1
650 TABLET ORAL EVERY 4 HOURS
Status: DISCONTINUED | OUTPATIENT
Start: 2018-04-19 | End: 2018-04-21 | Stop reason: HOSPADM

## 2018-04-19 RX ORDER — ROPIVACAINE HYDROCHLORIDE 2 MG/ML
8 INJECTION, SOLUTION EPIDURAL; INFILTRATION; PERINEURAL CONTINUOUS
Status: DISCONTINUED | OUTPATIENT
Start: 2018-04-19 | End: 2018-04-19

## 2018-04-19 RX ORDER — ONDANSETRON 2 MG/ML
4 INJECTION INTRAMUSCULAR; INTRAVENOUS EVERY 12 HOURS PRN
Status: DISCONTINUED | OUTPATIENT
Start: 2018-04-19 | End: 2018-04-21 | Stop reason: HOSPADM

## 2018-04-19 RX ORDER — CELECOXIB 100 MG/1
100 CAPSULE ORAL 2 TIMES DAILY
Status: DISCONTINUED | OUTPATIENT
Start: 2018-04-19 | End: 2018-04-21 | Stop reason: HOSPADM

## 2018-04-19 RX ORDER — SODIUM CHLORIDE, SODIUM LACTATE, POTASSIUM CHLORIDE, CALCIUM CHLORIDE 600; 310; 30; 20 MG/100ML; MG/100ML; MG/100ML; MG/100ML
INJECTION, SOLUTION INTRAVENOUS CONTINUOUS
Status: DISCONTINUED | OUTPATIENT
Start: 2018-04-19 | End: 2018-04-21 | Stop reason: HOSPADM

## 2018-04-19 RX ORDER — PREGABALIN 75 MG/1
75 CAPSULE ORAL 2 TIMES DAILY
Status: DISCONTINUED | OUTPATIENT
Start: 2018-04-19 | End: 2018-04-21 | Stop reason: HOSPADM

## 2018-04-19 RX ORDER — FAMOTIDINE 20 MG/1
20 TABLET, FILM COATED ORAL 2 TIMES DAILY
Status: DISCONTINUED | OUTPATIENT
Start: 2018-04-19 | End: 2018-04-21 | Stop reason: HOSPADM

## 2018-04-19 RX ORDER — OXYCODONE HCL 10 MG/1
10 TABLET, FILM COATED, EXTENDED RELEASE ORAL
Status: COMPLETED | OUTPATIENT
Start: 2018-04-19 | End: 2018-04-19

## 2018-04-19 RX ORDER — TRANEXAMIC ACID 100 MG/ML
10 INJECTION, SOLUTION INTRAVENOUS
Status: DISCONTINUED | OUTPATIENT
Start: 2018-04-19 | End: 2018-04-19

## 2018-04-19 RX ORDER — CEFAZOLIN SODIUM 2 G/50ML
2 SOLUTION INTRAVENOUS
Status: COMPLETED | OUTPATIENT
Start: 2018-04-19 | End: 2018-04-19

## 2018-04-19 RX ORDER — SODIUM CHLORIDE 0.9 % (FLUSH) 0.9 %
5 SYRINGE (ML) INJECTION
Status: DISCONTINUED | OUTPATIENT
Start: 2018-04-19 | End: 2018-04-21 | Stop reason: HOSPADM

## 2018-04-19 RX ORDER — POLYETHYLENE GLYCOL 3350 17 G/17G
17 POWDER, FOR SOLUTION ORAL DAILY
Status: DISCONTINUED | OUTPATIENT
Start: 2018-04-19 | End: 2018-04-21 | Stop reason: HOSPADM

## 2018-04-19 RX ORDER — CELECOXIB 100 MG/1
200 CAPSULE ORAL DAILY
Status: DISCONTINUED | OUTPATIENT
Start: 2018-04-20 | End: 2018-04-19 | Stop reason: SDUPTHER

## 2018-04-19 RX ORDER — LOPERAMIDE HYDROCHLORIDE 2 MG/1
4 CAPSULE ORAL ONCE
Status: COMPLETED | OUTPATIENT
Start: 2018-04-19 | End: 2018-04-19

## 2018-04-19 RX ORDER — DOCUSATE SODIUM 100 MG/1
100 CAPSULE, LIQUID FILLED ORAL EVERY 12 HOURS
Status: DISCONTINUED | OUTPATIENT
Start: 2018-04-19 | End: 2018-04-21 | Stop reason: HOSPADM

## 2018-04-19 RX ORDER — CELECOXIB 100 MG/1
200 CAPSULE ORAL
Status: COMPLETED | OUTPATIENT
Start: 2018-04-19 | End: 2018-04-19

## 2018-04-19 RX ORDER — ONDANSETRON 2 MG/ML
4 INJECTION INTRAMUSCULAR; INTRAVENOUS DAILY PRN
Status: DISCONTINUED | OUTPATIENT
Start: 2018-04-19 | End: 2018-04-19 | Stop reason: HOSPADM

## 2018-04-19 RX ORDER — ACETAMINOPHEN 325 MG/1
650 TABLET ORAL EVERY 4 HOURS
Status: DISCONTINUED | OUTPATIENT
Start: 2018-04-19 | End: 2018-04-19

## 2018-04-19 RX ORDER — OXYCODONE HYDROCHLORIDE 10 MG/1
10 TABLET ORAL EVERY 4 HOURS PRN
Status: DISCONTINUED | OUTPATIENT
Start: 2018-04-19 | End: 2018-04-21 | Stop reason: HOSPADM

## 2018-04-19 RX ORDER — METRONIDAZOLE 10 MG/G
GEL TOPICAL DAILY
Status: DISCONTINUED | OUTPATIENT
Start: 2018-04-19 | End: 2018-04-19

## 2018-04-19 RX ORDER — BACITRACIN 50000 [IU]/1
INJECTION, POWDER, FOR SOLUTION INTRAMUSCULAR
Status: DISCONTINUED | OUTPATIENT
Start: 2018-04-19 | End: 2018-04-19 | Stop reason: HOSPADM

## 2018-04-19 RX ORDER — ONDANSETRON 2 MG/ML
4 INJECTION INTRAMUSCULAR; INTRAVENOUS EVERY 12 HOURS PRN
Status: DISCONTINUED | OUTPATIENT
Start: 2018-04-19 | End: 2018-04-20

## 2018-04-19 RX ORDER — MIDAZOLAM HYDROCHLORIDE 1 MG/ML
INJECTION, SOLUTION INTRAMUSCULAR; INTRAVENOUS
Status: DISCONTINUED | OUTPATIENT
Start: 2018-04-19 | End: 2018-04-19

## 2018-04-19 RX ORDER — HYDROMORPHONE HYDROCHLORIDE 2 MG/ML
0.2 INJECTION, SOLUTION INTRAMUSCULAR; INTRAVENOUS; SUBCUTANEOUS EVERY 5 MIN PRN
Status: DISCONTINUED | OUTPATIENT
Start: 2018-04-19 | End: 2018-04-19 | Stop reason: HOSPADM

## 2018-04-19 RX ORDER — SODIUM CHLORIDE 0.9 % (FLUSH) 0.9 %
3 SYRINGE (ML) INJECTION
Status: DISCONTINUED | OUTPATIENT
Start: 2018-04-19 | End: 2018-04-20

## 2018-04-19 RX ORDER — CELECOXIB 100 MG/1
100 CAPSULE ORAL 2 TIMES DAILY
Status: DISCONTINUED | OUTPATIENT
Start: 2018-04-19 | End: 2018-04-19

## 2018-04-19 RX ORDER — LIDOCAINE HYDROCHLORIDE 10 MG/ML
5 INJECTION, SOLUTION EPIDURAL; INFILTRATION; INTRACAUDAL; PERINEURAL ONCE
Status: COMPLETED | OUTPATIENT
Start: 2018-04-19 | End: 2018-04-19

## 2018-04-19 RX ORDER — OXYCODONE HYDROCHLORIDE 5 MG/1
5 TABLET ORAL EVERY 4 HOURS PRN
Status: DISCONTINUED | OUTPATIENT
Start: 2018-04-19 | End: 2018-04-21 | Stop reason: HOSPADM

## 2018-04-19 RX ORDER — ACETAMINOPHEN 10 MG/ML
1000 INJECTION, SOLUTION INTRAVENOUS ONCE
Status: COMPLETED | OUTPATIENT
Start: 2018-04-19 | End: 2018-04-19

## 2018-04-19 RX ORDER — LORAZEPAM 2 MG/ML
0.25 INJECTION INTRAMUSCULAR
Status: DISCONTINUED | OUTPATIENT
Start: 2018-04-19 | End: 2018-04-19 | Stop reason: HOSPADM

## 2018-04-19 RX ORDER — PROPOFOL 10 MG/ML
VIAL (ML) INTRAVENOUS CONTINUOUS PRN
Status: DISCONTINUED | OUTPATIENT
Start: 2018-04-19 | End: 2018-04-19

## 2018-04-19 RX ADMIN — FAMOTIDINE 20 MG: 20 TABLET, FILM COATED ORAL at 09:04

## 2018-04-19 RX ADMIN — PREGABALIN 75 MG: 75 CAPSULE ORAL at 09:04

## 2018-04-19 RX ADMIN — SODIUM CHLORIDE, SODIUM GLUCONATE, SODIUM ACETATE, POTASSIUM CHLORIDE, MAGNESIUM CHLORIDE, SODIUM PHOSPHATE, DIBASIC, AND POTASSIUM PHOSPHATE: .53; .5; .37; .037; .03; .012; .00082 INJECTION, SOLUTION INTRAVENOUS at 07:04

## 2018-04-19 RX ADMIN — FENTANYL CITRATE 50 MCG: 50 INJECTION, SOLUTION INTRAMUSCULAR; INTRAVENOUS at 07:04

## 2018-04-19 RX ADMIN — SODIUM CHLORIDE, SODIUM LACTATE, POTASSIUM CHLORIDE, AND CALCIUM CHLORIDE: .6; .31; .03; .02 INJECTION, SOLUTION INTRAVENOUS at 09:04

## 2018-04-19 RX ADMIN — SODIUM CHLORIDE, SODIUM GLUCONATE, SODIUM ACETATE, POTASSIUM CHLORIDE, MAGNESIUM CHLORIDE, SODIUM PHOSPHATE, DIBASIC, AND POTASSIUM PHOSPHATE: .53; .5; .37; .037; .03; .012; .00082 INJECTION, SOLUTION INTRAVENOUS at 08:04

## 2018-04-19 RX ADMIN — DOCUSATE SODIUM 100 MG: 100 CAPSULE, LIQUID FILLED ORAL at 12:04

## 2018-04-19 RX ADMIN — CEFAZOLIN SODIUM 2 G: 2 SOLUTION INTRAVENOUS at 07:04

## 2018-04-19 RX ADMIN — TRANEXAMIC ACID 1000 MG: 100 INJECTION, SOLUTION INTRAVENOUS at 07:04

## 2018-04-19 RX ADMIN — MUPIROCIN 1 G: 20 OINTMENT TOPICAL at 09:04

## 2018-04-19 RX ADMIN — ACETAMINOPHEN 650 MG: 325 TABLET ORAL at 03:04

## 2018-04-19 RX ADMIN — PROPOFOL 50 MCG/KG/MIN: 10 INJECTION, EMULSION INTRAVENOUS at 07:04

## 2018-04-19 RX ADMIN — LOPERAMIDE HYDROCHLORIDE 4 MG: 2 CAPSULE ORAL at 12:04

## 2018-04-19 RX ADMIN — DOCUSATE SODIUM 100 MG: 100 CAPSULE, LIQUID FILLED ORAL at 09:04

## 2018-04-19 RX ADMIN — APIXABAN 2.5 MG: 2.5 TABLET, FILM COATED ORAL at 09:04

## 2018-04-19 RX ADMIN — TRANEXAMIC ACID: 100 INJECTION, SOLUTION INTRAVENOUS at 06:04

## 2018-04-19 RX ADMIN — MIDAZOLAM 2 MG: 1 INJECTION INTRAMUSCULAR; INTRAVENOUS at 06:04

## 2018-04-19 RX ADMIN — MUPIROCIN 1 G: 20 OINTMENT TOPICAL at 12:04

## 2018-04-19 RX ADMIN — ACETAMINOPHEN 1000 MG: 10 INJECTION, SOLUTION INTRAVENOUS at 09:04

## 2018-04-19 RX ADMIN — LIDOCAINE HYDROCHLORIDE 5 MG: 10 INJECTION, SOLUTION EPIDURAL; INFILTRATION; INTRACAUDAL; PERINEURAL at 07:04

## 2018-04-19 RX ADMIN — CELECOXIB 100 MG: 100 CAPSULE ORAL at 09:04

## 2018-04-19 RX ADMIN — FENTANYL CITRATE 50 MCG: 50 INJECTION, SOLUTION INTRAMUSCULAR; INTRAVENOUS at 06:04

## 2018-04-19 RX ADMIN — MIDAZOLAM 2 MG: 1 INJECTION INTRAMUSCULAR; INTRAVENOUS at 07:04

## 2018-04-19 RX ADMIN — SODIUM CHLORIDE, SODIUM LACTATE, POTASSIUM CHLORIDE, AND CALCIUM CHLORIDE: .6; .31; .03; .02 INJECTION, SOLUTION INTRAVENOUS at 04:04

## 2018-04-19 RX ADMIN — ACETAMINOPHEN 650 MG: 325 TABLET ORAL at 09:04

## 2018-04-19 RX ADMIN — LISINOPRIL 10 MG: 10 TABLET ORAL at 09:04

## 2018-04-19 RX ADMIN — CELECOXIB 200 MG: 100 CAPSULE ORAL at 07:04

## 2018-04-19 RX ADMIN — PREGABALIN 75 MG: 75 CAPSULE ORAL at 12:04

## 2018-04-19 RX ADMIN — OXYCODONE HYDROCHLORIDE 10 MG: 10 TABLET, FILM COATED, EXTENDED RELEASE ORAL at 07:04

## 2018-04-19 RX ADMIN — POLYETHYLENE GLYCOL 3350 17 G: 17 POWDER, FOR SOLUTION ORAL at 12:04

## 2018-04-19 RX ADMIN — OXYCODONE HYDROCHLORIDE 10 MG: 10 TABLET ORAL at 09:04

## 2018-04-19 RX ADMIN — ACETAMINOPHEN 650 MG: 325 TABLET ORAL at 06:04

## 2018-04-19 RX ADMIN — ROPIVACAINE HYDROCHLORIDE: 2 INJECTION, SOLUTION EPIDURAL; INFILTRATION at 09:04

## 2018-04-19 RX ADMIN — CEFAZOLIN SODIUM 2 G: 2 SOLUTION INTRAVENOUS at 04:04

## 2018-04-19 NOTE — OP NOTE
DATE OF PROCEDURE:  04/19/2018.    PREOPERATIVE DIAGNOSIS:  Osteoarthritis, left knee.    FINAL DIAGNOSIS:  Osteoarthritis, left knee.    PROCEDURE PERFORMED:  Jayesh and Jayesh rotating platform left total knee   arthroplasty.    SURGEON:  Dr. Markie Bang.    ASSISTANT:  PAT Billy.    ANESTHESIA:  General.    OPERATIVE PROCEDURE:  Left total knee arthroplasty performed on Ms. Carola Blanco   required the expertise of an experienced surgical assistant.  Jose Armando Josue   served as a surgical assistant and assisted throughout the case including   patient positioning, patient prepped, surgical exposure and retraction,   insertion of implants and wound closure.  No qualified surgical resident,   orthopedic surgical resident was available.    OPERATIVE REPORT:  The patient was brought to the Operating Room, while supine   was intubated.  Catheter was placed in the bladder.  She was given intravenous   antibiotics.  A tourniquet was placed on the proximal left thigh.  Left lower   extremity was cleansed with alcohol and prepped with ChloraPrep solution then   draped in the usual fashion.  We elevated the leg exsanguinated with an Esmarch   tourniquet and with the knee flexed inflated pneumatic tourniquet to 300 mmHg.    Straight anterior incision was made and medial arthrotomy into the knee joint,   there were mainly significant arthritic changes in the patellofemoral groove and   the medial compartment.  Using the Jayesh and Jayesh PFC intramedullary   femoral guide and extramedullary tibial guide, we sized the tibia, the femur to   a size 3 left femoral component and the tibia to a size 4 tibial baseplate.  The   articular surface from the patella was removed and the patella was sized at 35   mm.  Trial components were inserted after performing ligamentous balancing in   flexion and extension.  The knee was noted to be stable, fully extend, and the   patella tracked normally.  The trial implants were then  removed and the knee was   thoroughly irrigated with pulsatile lavage while methylmethacrylate was   prepared.  We then cemented into place a size 4 left tibial baseplate and a 35   mm all polyethylene patellar components.  We used a porous coated ____ cemented   left femoral component and impacted into place.  Trial interbody sizers were   trialed, but rotating platform were inserted.  A 12.5 mm trial gave a good fit   with stability and full extension.  The tourniquet was deflated.  The knee was   irrigated again with pulsatile lavage and then a 12.5 mm rotating platform   tibial polyethylene insert was inserted, was placed and the knee was brought   through a stable range of motion.  A drain was brought through a separate stab   incision.  The wound was closed in layers using combination of absorbable and   nonabsorbable sutures and staples on the skin.  Sterile dressings were applied   followed by knee immobilizer.  The patient was awakened, extubated and brought   to Recovery Room in stable condition.      MAYTE  dd: 04/19/2018 09:22:45 (CDT)  td: 04/19/2018 12:27:23 (CDT)  Doc ID   #[id number]  Job ID #[id number]    CC:

## 2018-04-19 NOTE — H&P
PCP: Prashanth Cabrera NP    History & Physical    Chief Complaint: s/p Left total knee arthroplasty    History of Present Illness:  Patient is a 52 y.o. female admitted to Hospitalist Service from Operation Room s/p left total knee arthroplasty performed by Dr. Bang. Patient reportedly has past medical history significant for OA and hypertension. Post-operatively, patient denied chest pain, shortness of breath, abdominal pain, nausea, vomiting, headache, vision changes, focal neuro-deficits, cough or fever.    Past Medical History:   Diagnosis Date    Arthritis     Hypertension      History reviewed. No pertinent surgical history.  Family History   Problem Relation Age of Onset    Cancer Mother      breast    Arthritis Mother     Birth defects Mother     Cancer Father      lung    Arthritis Maternal Grandmother      Social History   Substance Use Topics    Smoking status: Never Smoker    Smokeless tobacco: Never Used    Alcohol use No      Review of patient's allergies indicates:  No Known Allergies  PTA Medications   Medication Sig    ibuprofen (ADVIL,MOTRIN) 100 MG tablet Take 100 mg by mouth every 6 (six) hours as needed for Temperature greater than.    lisinopril 10 MG tablet Take 1 tablet (10 mg total) by mouth every evening.    naproxen (NAPROSYN) 500 MG tablet Take 500 mg by mouth 3 (three) times daily as needed.    diclofenac sodium (VOLTAREN) 1 % Gel Apply 4 g topically 4 (four) times daily.    metronidazole 1% (METROGEL) 1 % Gel Apply topically once daily.     Review of Systems:  Constitutional: no fever or chills  Eyes: no visual changes  Ears, nose, mouth, throat, and face: no nasal congestion or sore throat  Respiratory: no cough or shorness of breath  Cardiovascular: no chest pain or palpitations  Gastrointestinal: no nausea or vomiting, no abdominal pain or change in bowel habits  Genitourinary: no hematuria or dysuria  Integument/breast: no rash or pruritis  Hematologic/lymphatic: no  easy bruising or lymphadenopathy  Musculoskeletal: see HPI  Neurological: no seizures or tremors.  Behavioral/Psych: no auditory or visual hallucinations  Endocrine: no heat or cold intolerance     OBJECTIVE:     Vital Signs (Most Recent)  Temp: 97.9 °F (36.6 °C) (04/19/18 1054)  Pulse: 79 (04/19/18 1141)  Resp: 18 (04/19/18 1141)  BP: 127/81 (04/19/18 1054)  SpO2: 97 % (04/19/18 1141)    Physical Exam:  General appearance: well developed, appears stated age  Head: normocephalic, atraumatic  Eyes:  conjunctivae/corneas clear. PERRL.  Nose: Nares normal. Septum midline.  Throat: lips, mucosa, and tongue normal; teeth and gums normal, no throat erythema.  Neck: supple, symmetrical, trachea midline, no JVD and thyroid not enlarged, symmetric, no tenderness/mass/nodules  Lungs:  clear to auscultation bilaterally and normal respiratory effort  Chest wall: no tenderness  Heart: regular rate and rhythm, S1, S2 normal, no murmur, click, rub or gallop  Abdomen: soft, non-tender non-distented; bowel sounds normal; no masses,  no organomegaly  Extremities: no cyanosis, clubbing or edema. Left knee dressing C/D/I with a SHERMAN drain.   Pulses: 2+ and symmetric  Skin: Skin color, texture, turgor normal. No rashes or lesions.  Lymph nodes: Cervical, supraclavicular, and axillary nodes normal.  Neurologic: Normal strength and tone. No focal numbness or weakness. CNII-XII intact.      Laboratory:   CBC:   Recent Labs  Lab 04/19/18  0945   WBC 11.30   RBC 4.75   HGB 12.8   HCT 38.6      MCV 81*   MCH 26.9*   MCHC 33.0     CMP:   Recent Labs  Lab 04/19/18  0945   *   CALCIUM 8.6*      K 5.3*   CO2 23      BUN 17   CREATININE 1.3     Diagnostic Results: None    Assessment/Plan:     Active Hospital Problems    Diagnosis  POA    *S/P total knee arthroplasty, left [Z96.652]  Not Applicable    Primary osteoarthritis of left knee [M17.12]  Continue to follow Orthopedic recommendations.  Needs aggressive incentive  spirometry.  Follow hemoglobin and hematocrit closely.  Pain control with IV narcotics and antiemetics as needed.  Physical therapy as per Orthopedics protocol with fall precautions.    Yes    Hypertension [I10]  Chronic problem. Will continue chronic medications and monitor for any changes, adjusting as needed.    Yes        VTE Risk Mitigation         Ordered     apixaban tablet 2.5 mg  2 times daily      04/19/18 1052     IP VTE HIGH RISK PATIENT  Once      04/19/18 1052        Guera Liu MD  Department of Hospital Medicine   [facility]

## 2018-04-19 NOTE — PT/OT/SLP EVAL
"Physical Therapy Evaluation    Patient Name:  Carola Blanco   MRN:  [id number]    Recommendations:     Discharge Recommendations:  home health PT   Discharge Equipment Recommendations: none   Barriers to discharge: None    Assessment:     Carola Blanco is a 52 y.o. female admitted with a medical diagnosis of S/P total knee arthroplasty, left.  She presents with the following impairments/functional limitations:  weakness, impaired endurance, impaired functional mobilty, gait instability, impaired sensation, impaired self care skills, impaired balance, decreased lower extremity function, pain, decreased ROM, impaired joint extensibility, orthopedic precautions. Pt was motivated to participate in PT today however gait distance limited by knee instability from spinal anesthesia.    Rehab Prognosis:  good; patient would benefit from acute skilled PT services to address these deficits and reach maximum level of function.      Recent Surgery: Procedure(s) (LRB):  ARTHROPLASTY-KNEE (Left) Day of Surgery    Plan:     During this hospitalization, patient to be seen BID (QD Sat-Sun) to address the above listed problems via gait training, therapeutic activities, therapeutic exercises  · Plan of Care Expires:  05/18/18   Plan of Care Reviewed with: patient    Subjective     Communicated with RN prior to session.  Patient found supine in bed upon PT entry to room, agreeable to evaluation.      Chief Complaint: anterior R knee "throbbing"  Patient comments/goals: "I am ok with getting up."  Pain/Comfort:  · Pain Rating 1: 4/10  · Location - Side 1: Left  · Location - Orientation 1: anterior  · Location 1: knee  · Pain Addressed 1: Reposition, Distraction  · Pain Rating Post-Intervention 1: 4/10    Patients cultural, spiritual, Confucianism conflicts given the current situation: None    Living Environment:  Pt lives with her  in a [facility] with a threshold entrance. There are 14 stairs leading to the 2nd floor (7 stairs - landing - 7 " stairs)  Prior to admission, patients level of function was independent.  Patient has the following equipment: walker, rolling, shower chair, bedside commode. Upon discharge, patient will have assistance from her .    Objective:     Patient found with: peripheral IV, perineural catheter, mckeon catheter (NIRMAL drain)     General Precautions: Standard, fall   Orthopedic Precautions:LLE weight bearing as tolerated   Braces: Knee immobilizer     Exams:  · Gross Motor Coordination:  WFL  · Postural Exam:  Patient presented with the following abnormalities:    · -       Rounded shoulders  · -       Forward head  · -       Posterior pelvic tilt  · Sensation:    · -       Impaired  light/touch to BLE, however sensation returning distally from spinal anesthesia  · RUE ROM: WNL  · RUE Strength: WNL  · LUE ROM: WNL  · LUE Strength: WNL  · RLE ROM: WNL  · RLE Strength: WNL  · LLE ROM: WFL except knee -15 to 60 degrees  · LLE Strength: 2+/5 knee ext and hip flex limited by pain; ankle WNL    Functional Mobility:  · Bed Mobility:  · Scooting: stand by assistance while seated EOB  · Supine to Sit: moderate assistance for LLE  · Sit to Supine: maximal assistance for BLE  · Transfers  · Sit to Stand:  minimum assistance with rolling walker   · Cues for hand placement  · Gait:  · Pt took 5 side steps along bed then 3 steps forward and back using a walker and with minimal assistance  · Pt with increased use of UE support on walker due to decreased knee stability and sensation, which contributed to fatigue    AM-PAC 6 CLICK MOBILITY  Total Score:16       Therapeutic Activities and Exercises:   CPM placed to L knee with ROM 0-30 degrees. Pt with no c/o discomfort.     Patient left supine with all lines intact, call button in reach and RN notified.    GOALS:    Physical Therapy Goals     Not on file                History:     Past Medical History:   Diagnosis Date    Arthritis     Hypertension        History reviewed. No  pertinent surgical history.    Clinical Decision Making:     History  Co-morbidities and personal factors that may impact the plan of care Examination  Body Structures and Functions, activity limitations and participation restrictions that may impact the plan of care Clinical Presentation   Decision Making/ Complexity Score   Co-morbidities:   [] Time since onset of injury / illness / exacerbation  [] Status of current condition  []Patient's cognitive status and safety concerns    [] Multiple Medical Problems (see med hx)  Personal Factors:   [] Patient's age  [] Prior Level of function   [] Patient's home situation (environment and family support)  [] Patient's level of motivation  [] Expected progression of patient      HISTORY:(criteria)    [] 32825 - no personal factors/history    [] 57159 - has 1-2 personal factor/comorbidity     [] 02040 - has >3 personal factor/comorbidity     Body Regions:  [] Objective examination findings  [] Head     []  Neck  [] Trunk   [] Upper Extremity  [] Lower Extremity    Body Systems:  [] For communication ability, affect, cognition, language, and learning style: the assessment of the ability to make needs known, consciousness, orientation (person, place, and time), expected emotional /behavioral responses, and learning preferences (eg, learning barriers, education  needs)  [] For the neuromuscular system: a general assessment of gross coordinated movement (eg, balance, gait, locomotion, transfers, and transitions) and motor function  (motor control and motor learning)  [] For the musculoskeletal system: the assessment of gross symmetry, gross range of motion, gross strength, height, and weight  [] For the integumentary system: the assessment of pliability(texture), presence of scar formation, skin color, and skin integrity  [] For cardiovascular/pulmonary system: the assessment of heart rate, respiratory rate, blood pressure, and edema     Activity limitations:    [] Patient's  cognitive status and saf ety concerns          [] Status of current condition      [] Weight bearing restriction  [] Cardiopulmunary Restriction    Participation Restrictions:   [] Goals and goal agreement with the patient     [] Rehab potential (prognosis) and probable outcome      Examination of Body System: (criteria)    [] 22725 - addressing 1-2 elements    [] 70321 - addressing a total of 3 or more elements     [] 79317 -  Addressing a total of 4 or more elements         Clinical Presentation: (criteria)  Choose one     On examination of body system using standardized tests and measures patient presents with (CHOOSE ONE) elements from any of the following: body structures and functions, activity limitations, and/or participation restrictions.  Leading to a clinical presentation that is considered (CHOOSE ONE)                              Clinical Decision Making  (Eval Complexity):  Choose One     Time Tracking:     PT Received On: 04/19/18  PT Start Time: 1417     PT Stop Time: 1440  PT Total Time (min): 23 min     Billable Minutes: Evaluation 15 and Gait Training 8      Jessica LeJeune, PT, DPT

## 2018-04-19 NOTE — TRANSFER OF CARE
"Anesthesia Transfer of Care Note    Patient: Carola Blanco    Procedure(s) Performed: Procedure(s) (LRB):  ARTHROPLASTY-KNEE (Left)    Patient location: PACU    Anesthesia Type: MAC and spinal    Transport from OR: Transported from OR on room air with adequate spontaneous ventilation    Post pain: adequate analgesia    Post assessment: no apparent anesthetic complications    Post vital signs: stable    Level of consciousness: awake, alert and oriented    Nausea/Vomiting: no nausea/vomiting    Complications: none    Transfer of care protocol was followed      Last vitals:   Visit Vitals  BP (!) 145/74 (BP Location: Left arm, Patient Position: Lying)   Pulse 90   Temp 36.9 °C (98.4 °F) (Temporal)   Resp 14   Ht 5' 5" (1.651 m)   Wt 115.7 kg (255 lb)   SpO2 97%   Breastfeeding? No   BMI 42.43 kg/m²     "

## 2018-04-19 NOTE — ANESTHESIA PREPROCEDURE EVALUATION
04/19/2018  Carola Blanco is a 52 y.o., female.    Anesthesia Evaluation    I have reviewed the Patient Summary Reports.    I have reviewed the Nursing Notes.      Review of Systems  Anesthesia Hx:  No problems with previous Anesthesia    Cardiovascular:   Hypertension, well controlled        Physical Exam  General:  Morbid Obesity                 Anesthesia Plan  Type of Anesthesia, risks & benefits discussed:  Anesthesia Type:  spinal  Patient's Preference:   Intra-op Monitoring Plan:   Intra-op Monitoring Plan Comments:   Post Op Pain Control Plan:   Post Op Pain Control Plan Comments:   Induction:   IV  Beta Blocker:  Patient is not currently on a Beta-Blocker (No further documentation required).       Informed Consent: Patient understands risks and agrees with Anesthesia plan.  Questions answered. Anesthesia consent signed with patient.  ASA Score: 2     Day of Surgery Review of History & Physical:    H&P update referred to the surgeon.         Ready For Surgery From Anesthesia Perspective.

## 2018-04-19 NOTE — ANESTHESIA POSTPROCEDURE EVALUATION
"Anesthesia Post Evaluation    Patient: Carola Blanco    Procedure(s) Performed: Procedure(s) (LRB):  ARTHROPLASTY-KNEE (Left)    Final Anesthesia Type: general  Patient location during evaluation: PACU  Patient participation: Yes- Able to Participate  Level of consciousness: awake and alert  Post-procedure vital signs: reviewed and stable  Pain management: adequate  Airway patency: patent  PONV status at discharge: No PONV  Anesthetic complications: no      Cardiovascular status: blood pressure returned to baseline and hemodynamically stable  Respiratory status: unassisted  Hydration status: euvolemic  Follow-up not needed.        Visit Vitals  BP (!) 97/55   Pulse 70   Temp 36.9 °C (98.4 °F) (Temporal)   Resp (!) 25   Ht 5' 5" (1.651 m)   Wt 115.7 kg (255 lb)   SpO2 100%   Breastfeeding? No   BMI 42.43 kg/m²       Pain/Xander Score: Pain Assessment Performed: Yes (4/19/2018  6:25 AM)  Presence of Pain: denies (4/19/2018  6:25 AM)  Pain Rating Prior to Med Admin: 0 (4/19/2018  7:10 AM)      "

## 2018-04-19 NOTE — PLAN OF CARE
CM met with patient at bedside, patient reports living with spouse, Philip Blanco [phone], denies POA, lining will or home health services. Pharmacy is [facility], PCP is Prashanth Cabrera NP. Patient reports prior independence with no use of assist devices. Patient reports having a borrowed roling walker and BSC has been delivered to home for use post op. Patient reports having shower chair. Patient choice disclosure signed and patient will take first available home health in network with [employer],  Spouse is concerned about bedrooms being upstairs (Approx 14-15 steps) and patient being able to walk upstairs upon arrival at home. Patient will inform physical therapy to instruct on stair climbing and spouse has also inquired about using paramedic service to get patient up the stairs on arrival to home. CM also informed that fire department could possibly be able to help. Spouse reports large living space upstairs and is not interested in making bedroom downstairs. CM made post op followup and added to avs.      04/19/18 1200   Discharge Assessment   Assessment Type Discharge Planning Assessment   Confirmed/corrected address and phone number on facesheet? Yes   Assessment information obtained from? Patient;Caregiver   Communicated expected length of stay with patient/caregiver yes   Prior to hospitilization cognitive status: Alert/Oriented   Prior to hospitalization functional status: Independent   Current cognitive status: Alert/Oriented   Current Functional Status: Assistive Equipment   Lives With spouse   Able to Return to Prior Arrangements yes   Is patient able to care for self after discharge? Yes   Patient's perception of discharge disposition home health   Readmission Within The Last 30 Days no previous admission in last 30 days   Patient currently being followed by outpatient case management? No   Patient currently receives any other outside agency services? No   Equipment Currently Used at Home none   Do you have any  problems affording any of your prescribed medications? No   Is the patient taking medications as prescribed? yes   Does the patient have transportation home? Yes   Transportation Available family or friend will provide   Does the patient receive services at the [facility]? No   Discharge Plan A Home Health   Patient/Family In Agreement With Plan yes

## 2018-04-19 NOTE — ANESTHESIA PROCEDURE NOTES
Spinal    Diagnosis: Knee pain  Patient location during procedure: OR  Start time: 4/19/2018 7:11 AM  Timeout: 4/19/2018 7:10 AM  End time: 4/19/2018 7:30 AM  Staffing  Anesthesiologist: EITAN MCCALL  Performed: anesthesiologist   Preanesthetic Checklist  Completed: patient identified, site marked, surgical consent, pre-op evaluation, timeout performed, IV checked, risks and benefits discussed and monitors and equipment checked  Spinal Block  Patient position: sitting  Prep: ChloraPrep  Patient monitoring: cardiac monitor  Approach: midline  Location: L3-4  Injection technique: single shot  CSF Fluid: clear free-flowing CSF  Needle  Needle type: Pawaa Softwarecke   Needle gauge: 22 G  Needle length: 3.5 in  Additional Documentation: incremental injection, negative aspiration for heme and no paresthesia on injection  Needle localization: anatomical landmarks  Assessment  Sensory level: T10   Dermatomal levels determined by alcohol wipe  Ease of block: difficult  Patient's tolerance of the procedure: comfortable throughout block and no complaints  Medications:  Bolus administered: 1.8 mL of 0.75 and with dextrose bupivacaine  Additional Notes  Spinal block placed with 1.8 cc 0.75% hyperbaric bupivicaine.  Pt tolerated well

## 2018-04-19 NOTE — PLAN OF CARE
Problem: Patient Care Overview  Goal: Plan of Care Review  Outcome: Ongoing (interventions implemented as appropriate)  PT AAO X4. PT able to verbalize needs/want. Plan of care reviewed with patient and spouse. Both verbalized understanding. IV fluids infusing as ordered. IV antibiotics infused as ordered. CPM set as ordered. Q ball in infusing. Surgical dressing in place CDI. Nuero checks Q4. Pedal pulses +2 .  at bedside attentive to patient. Pain well controlled with PRN pain medication. Side rails up X2, bed in lowest, locked position, needs attended to, call light within reach will continue to monitor.

## 2018-04-19 NOTE — ANESTHESIA PROCEDURE NOTES
Peripheral    Patient location during procedure: pre-op   Block not for primary anesthetic.  Reason for block: at surgeon's request and post-op pain management   Post-op Pain Location: Left knee pain  Start time: 4/19/2018 6:41 AM  Timeout: 4/19/2018 6:10 AM   End time: 4/19/2018 6:52 AM  Staffing  Anesthesiologist: EITAN MCCALL  Performed: anesthesiologist   Preanesthetic Checklist  Completed: patient identified, site marked, surgical consent, pre-op evaluation, timeout performed, IV checked, risks and benefits discussed and monitors and equipment checked  Peripheral Block  Patient position: supine  Prep: ChloraPrep  Block type: adductor canal  Laterality: left  Injection technique: continuous  Needle  Needle type: Tuohy   Needle gauge: 18 G  Needle length: 4 in  Needle localization: ultrasound guidance  Catheter type: non-stimulating  Catheter size: 20 G  Test dose: lidocaine 1.5% with Epi 1-to-200,000 and negative   -ultrasound image captured on disc.  Assessment  Injection assessment: negative aspiration, negative parasthesia and local visualized surrounding nerve  Paresthesia pain: none  Heart rate change: no  Slow fractionated injection: no  Medications:  Bolus administered: 30 mL of 0.5 ropivacaine  Epinephrine added: none  Additional Notes  Left continuous adductor canal block placed with 30 cc 0.5% Naropin with 8 mg Decadron. Pt tolerated well

## 2018-04-19 NOTE — BRIEF OP NOTE
[facility]  Brief Operative Note    SUMMARY     Surgery Date: 4/19/2018     Surgeon(s) and Role:     * Markie Bang MD - Primary    Assisting Surgeon: mauricio Josue    Pre-op Diagnosis:  Osteoarthritis of left knee, unspecified osteoarthritis type [M17.12]    Post-op Diagnosis:  Post-Op Diagnosis Codes:     * Osteoarthritis of left knee, unspecified osteoarthritis type [M17.12]    Procedure(s) (LRB):  ARTHROPLASTY-KNEE (Left)    Anesthesia: General    Description of Procedure: dictated    Description of the findings of the procedure: dictated    Estimated Blood Loss: * No values recorded between 4/19/2018  7:58 AM and 4/19/2018  9:09 AM *         Specimens:   Specimen (12h ago through future)    None

## 2018-04-19 NOTE — PLAN OF CARE
Released from pacu  Per anesthesia vs wnl pain controlled mckeon cath draining sat   Knee with ice under and immbolizer on

## 2018-04-20 LAB
ANION GAP SERPL CALC-SCNC: 7 MMOL/L
BASOPHILS # BLD AUTO: 0 K/UL
BASOPHILS NFR BLD: 0.2 %
BUN SERPL-MCNC: 10 MG/DL
CALCIUM SERPL-MCNC: 9.2 MG/DL
CHLORIDE SERPL-SCNC: 104 MMOL/L
CO2 SERPL-SCNC: 24 MMOL/L
CREAT SERPL-MCNC: 0.9 MG/DL
DIFFERENTIAL METHOD: ABNORMAL
EOSINOPHIL # BLD AUTO: 0 K/UL
EOSINOPHIL NFR BLD: 0.2 %
ERYTHROCYTE [DISTWIDTH] IN BLOOD BY AUTOMATED COUNT: 14.4 %
EST. GFR  (AFRICAN AMERICAN): >60 ML/MIN/1.73 M^2
EST. GFR  (NON AFRICAN AMERICAN): >60 ML/MIN/1.73 M^2
GLUCOSE SERPL-MCNC: 128 MG/DL
HCT VFR BLD AUTO: 36.2 %
HGB BLD-MCNC: 11.8 G/DL
LYMPHOCYTES # BLD AUTO: 2.5 K/UL
LYMPHOCYTES NFR BLD: 17.3 %
MCH RBC QN AUTO: 27 PG
MCHC RBC AUTO-ENTMCNC: 32.7 G/DL
MCV RBC AUTO: 83 FL
MONOCYTES # BLD AUTO: 1.2 K/UL
MONOCYTES NFR BLD: 8.1 %
NEUTROPHILS # BLD AUTO: 10.5 K/UL
NEUTROPHILS NFR BLD: 74.2 %
PLATELET # BLD AUTO: 336 K/UL
PMV BLD AUTO: 7.8 FL
POTASSIUM SERPL-SCNC: 4.4 MMOL/L
RBC # BLD AUTO: 4.39 M/UL
SODIUM SERPL-SCNC: 135 MMOL/L
WBC # BLD AUTO: 14.2 K/UL

## 2018-04-20 PROCEDURE — 25000003 PHARM REV CODE 250: Performed by: ANESTHESIOLOGY

## 2018-04-20 PROCEDURE — 63600175 PHARM REV CODE 636 W HCPCS: Performed by: PHYSICIAN ASSISTANT

## 2018-04-20 PROCEDURE — 99232 SBSQ HOSP IP/OBS MODERATE 35: CPT | Mod: ,,, | Performed by: INTERNAL MEDICINE

## 2018-04-20 PROCEDURE — 97110 THERAPEUTIC EXERCISES: CPT

## 2018-04-20 PROCEDURE — 25000003 PHARM REV CODE 250: Performed by: ORTHOPAEDIC SURGERY

## 2018-04-20 PROCEDURE — 36415 COLL VENOUS BLD VENIPUNCTURE: CPT

## 2018-04-20 PROCEDURE — 97116 GAIT TRAINING THERAPY: CPT

## 2018-04-20 PROCEDURE — 85025 COMPLETE CBC W/AUTO DIFF WBC: CPT

## 2018-04-20 PROCEDURE — 25000003 PHARM REV CODE 250: Performed by: PHYSICIAN ASSISTANT

## 2018-04-20 PROCEDURE — 80048 BASIC METABOLIC PNL TOTAL CA: CPT

## 2018-04-20 PROCEDURE — 94761 N-INVAS EAR/PLS OXIMETRY MLT: CPT

## 2018-04-20 PROCEDURE — 12000002 HC ACUTE/MED SURGE SEMI-PRIVATE ROOM

## 2018-04-20 PROCEDURE — 94799 UNLISTED PULMONARY SVC/PX: CPT

## 2018-04-20 RX ADMIN — OXYCODONE HYDROCHLORIDE 5 MG: 5 TABLET ORAL at 08:04

## 2018-04-20 RX ADMIN — CEFAZOLIN SODIUM 2 G: 2 SOLUTION INTRAVENOUS at 12:04

## 2018-04-20 RX ADMIN — DOCUSATE SODIUM 100 MG: 100 CAPSULE, LIQUID FILLED ORAL at 08:04

## 2018-04-20 RX ADMIN — ACETAMINOPHEN 650 MG: 325 TABLET ORAL at 02:04

## 2018-04-20 RX ADMIN — PREGABALIN 75 MG: 75 CAPSULE ORAL at 08:04

## 2018-04-20 RX ADMIN — CEFAZOLIN SODIUM 2 G: 2 SOLUTION INTRAVENOUS at 06:04

## 2018-04-20 RX ADMIN — MUPIROCIN 1 G: 20 OINTMENT TOPICAL at 08:04

## 2018-04-20 RX ADMIN — POLYETHYLENE GLYCOL 3350 17 G: 17 POWDER, FOR SOLUTION ORAL at 08:04

## 2018-04-20 RX ADMIN — OXYCODONE HYDROCHLORIDE 15 MG: 5 TABLET ORAL at 03:04

## 2018-04-20 RX ADMIN — ACETAMINOPHEN 650 MG: 325 TABLET ORAL at 06:04

## 2018-04-20 RX ADMIN — ACETAMINOPHEN 650 MG: 325 TABLET ORAL at 10:04

## 2018-04-20 RX ADMIN — SODIUM CHLORIDE, SODIUM LACTATE, POTASSIUM CHLORIDE, AND CALCIUM CHLORIDE: .6; .31; .03; .02 INJECTION, SOLUTION INTRAVENOUS at 02:04

## 2018-04-20 RX ADMIN — APIXABAN 2.5 MG: 2.5 TABLET, FILM COATED ORAL at 08:04

## 2018-04-20 RX ADMIN — CELECOXIB 100 MG: 100 CAPSULE ORAL at 08:04

## 2018-04-20 RX ADMIN — LISINOPRIL 10 MG: 10 TABLET ORAL at 08:04

## 2018-04-20 RX ADMIN — FAMOTIDINE 20 MG: 20 TABLET, FILM COATED ORAL at 08:04

## 2018-04-20 NOTE — PROGRESS NOTES
"[facility]  Orthopedics  Progress Note    Patient Name: Carola Blanco  MRN: [id number]  Admission Date: 4/19/2018  Hospital Length of Stay: 1 days  Attending Provider: Guera Lui MD  Primary Care Provider: Prashanth Cabrera NP  Follow-up For: Procedure(s) (LRB):  ARTHROPLASTY-KNEE (Left)    Post-Operative Day: 1 Day Post-Op  Subjective:     Principal Problem:S/P total knee arthroplasty, left    Principal Orthopedic Problem: S/P L TKA    Interval History: none    Review of patient's allergies indicates:  No Known Allergies    Current Facility-Administered Medications   Medication    acetaminophen tablet 650 mg    apixaban tablet 2.5 mg    cefazolin (ANCEF) 2 gram in dextrose 5% 50 mL IVPB (premix)    celecoxib capsule 100 mg    celecoxib capsule 400 mg    docusate sodium capsule 100 mg    famotidine tablet 20 mg    lactated ringers infusion    lisinopril tablet 10 mg    loperamide capsule 2 mg    mupirocin 2 % ointment 1 g    ondansetron disintegrating tablet 8 mg    ondansetron injection 4 mg    oxyCODONE immediate release tablet 10 mg    oxyCODONE immediate release tablet 15 mg    oxyCODONE immediate release tablet 5 mg    oxyCODONE immediate release tablet 5 mg    oxyCODONE immediate release tablet Tab 10 mg    polyethylene glycol packet 17 g    pregabalin capsule 75 mg    promethazine (PHENERGAN) 6.25 mg in dextrose 5 % 50 mL IVPB    ramelteon tablet 8 mg    ropivacaine (NAROPIN) 0.2% ON-Q C-BLOC 750mL (med + pump)    sodium chloride 0.9% flush 5 mL     Objective:     Vital Signs (Most Recent):  Temp: 97.1 °F (36.2 °C) (04/20/18 0440)  Pulse: 82 (04/20/18 0440)  Resp: 20 (04/20/18 0440)  BP: 116/65 (04/20/18 0440)  SpO2: 97 % (04/20/18 0440) Vital Signs (24h Range):  Temp:  [97.1 °F (36.2 °C)-98.9 °F (37.2 °C)] 97.1 °F (36.2 °C)  Pulse:  [] 82  Resp:  [15-22] 20  SpO2:  [93 %-100 %] 97 %  BP: ()/(46-81) 116/65     Weight: 115.7 kg (255 lb 1.2 oz)  Height: 5' 5" (165.1 " cm)  Body mass index is 42.45 kg/m².      Intake/Output Summary (Last 24 hours) at 04/20/18 0704  Last data filed at 04/19/18 1840   Gross per 24 hour   Intake           4227.5 ml   Output             1000 ml   Net           3227.5 ml       General    Vitals reviewed.  Constitutional: She is oriented to person, place, and time.   Obese   Pulmonary/Chest: Effort normal.   Abdominal: Soft. Bowel sounds are normal.   Neurological: She is alert and oriented to person, place, and time.   Psychiatric: She has a normal mood and affect. Her behavior is normal.             Left Knee Exam     Comments:  LLE DNVI. Dressing clean and dry      Significant Labs:   CBC:   Recent Labs  Lab 04/19/18  0945 04/20/18  0532   WBC 11.30 14.20*   HGB 12.8 11.8*   HCT 38.6 36.2*    336     CMP:   Recent Labs  Lab 04/19/18  0945 04/20/18  0532    135*   K 5.3* 4.4    104   CO2 23 24   * 128*   BUN 17 10   CREATININE 1.3 0.9   CALCIUM 8.6* 9.2   ANIONGAP 8 7*   EGFRNONAA 48* >60     All pertinent labs within the past 24 hours have been reviewed.    Significant Imaging: Status post left knee arthroplasty without complication.    Assessment/Plan:     * S/P total knee arthroplasty, left    OOB with PT and nursing  DC [facility] and change dressing tomorrow 0600  Plan for DC home tomorrow afternoon with PAT DIAZ  Orthopedics  [facility]

## 2018-04-20 NOTE — PT/OT/SLP PROGRESS
Physical Therapy Treatment    Patient Name:  Carola Blanco   MRN:  [id number]    Recommendations:     Discharge Recommendations:  home health PT   Discharge Equipment Recommendations: none   Barriers to discharge: None    Assessment:     Carola Blanco is a 52 y.o. female admitted with a medical diagnosis of S/P total knee arthroplasty, left.  She presents with the following impairments/functional limitations:  weakness, impaired endurance, impaired functional mobilty, gait instability, impaired self care skills, decreased lower extremity function, pain, orthopedic precautions .    Rehab Prognosis:  good; patient would benefit from acute skilled PT services to address these deficits and reach maximum level of function.      Recent Surgery: Procedure(s) (LRB):  ARTHROPLASTY-KNEE (Left) 1 Day Post-Op    Plan:     During this hospitalization, patient to be seen BID (daily Sat and Sun) to address the above listed problems via gait training, therapeutic activities, therapeutic exercises  · Plan of Care Expires:  05/18/18   Plan of Care Reviewed with: patient, spouse    Subjective     Communicated with nurse Susie prior to session.  Patient found supine in bed upon PT entry to room, agreeable to treatment.      Chief Complaint: none stated  Patient comments/goals: to return home  Pain/Comfort:  · Pain Rating 1: 2/10  · Location - Side 1: Left  · Location 1: knee  · Pain Addressed 1: Pre-medicate for activity, Reposition, Nurse notified    Patients cultural, spiritual, Religion conflicts given the current situation: None    Objective:     Patient found with: perineural catheter, mckeon catheter, knee immobilizer, SCD (NIRMAL drain)     General Precautions: Standard, fall   Orthopedic Precautions:LLE weight bearing as tolerated   Braces: Knee immobilizer     Functional Mobility:  · Bed Mobility:     · Rolling Left:  contact guard assistance  · Supine to Sit: minimum assistance  · Transfers:     · Sit to Stand:  minimum assistance with  rolling walker  · Gait: 20' with rw and Min A, WBAT LLE with KI in place.      AM-PAC 6 CLICK MOBILITY          Therapeutic Activities and Exercises:   LE exercises at 10 reps each ; SLR's, HS, AP's, QS.    Patient left up in chair with all lines intact, call button in reach, nurse Susie notified and spouse present..    GOALS:    Physical Therapy Goals     Not on file                Time Tracking:     PT Received On: 04/20/18  PT Start Time: 0940     PT Stop Time: 1000  PT Total Time (min): 20 min     Billable Minutes: Gait Training 12min and Therapeutic Exercise 8min    Treatment Type: Treatment  PT/PTA: PTA     PTA Visit Number: 1     Rosi Hilton, PTA  04/20/2018

## 2018-04-20 NOTE — ANESTHESIA POST-OP PAIN MANAGEMENT
Acute Pain Service Progress Note    Carola Blanco is a 52 y.o., female, [id number].    Surgery:  Arthroplasty L knee    Post Op Day #: 1    Catheter type: perineural  Adductor canal     Infusion type: Ropivacaine 0.2%  8 basal    Problem List:    Active Hospital Problems    Diagnosis  POA    *S/P total knee arthroplasty, left [Z96.652]  Not Applicable    Primary osteoarthritis of left knee [M17.12]  Yes    Hypertension [I10]  Yes      Resolved Hospital Problems    Diagnosis Date Resolved POA   No resolved problems to display.       Subjective:     General appearance of alert, oriented, no complaints   Pain with rest: 2        Pain with movement: 2        Side Effects    1. Pruritis No    2. Nausea No    3. Motor Blockade No,     4. Sedation No,  Objective:     Catheter level unchanged    Catheter site clean, dry, intact         Vitals   Vitals:    04/20/18 0801   BP: 129/60   Pulse: 78   Resp: 18   Temp: 37.1 °C (98.7 °F)        Labs    Admission on 04/19/2018   Component Date Value Ref Range Status    Prothrombin Time 04/19/2018 10.3  9.0 - 12.5 sec Final    INR 04/19/2018 1.0  0.8 - 1.2 Final    Specimen UA 04/19/2018 Urine, Catheterized   Final    Color, UA 04/19/2018 Yellow  Yellow, Straw, Valeria Final    Appearance, UA 04/19/2018 Clear  Clear Final    pH, UA 04/19/2018 6.0  5.0 - 8.0 Final    Specific Gravity, UA 04/19/2018 >=1.030* 1.005 - 1.030 Final    Protein, UA 04/19/2018 Negative  Negative Final    Glucose, UA 04/19/2018 Negative  Negative Final    Ketones, UA 04/19/2018 Negative  Negative Final    Bilirubin (UA) 04/19/2018 Negative  Negative Final    Occult Blood UA 04/19/2018 Trace* Negative Final    Nitrite, UA 04/19/2018 Negative  Negative Final    Urobilinogen, UA 04/19/2018 Negative  <2.0 EU/dL Final    Leukocytes, UA 04/19/2018 Negative  Negative Final    Sodium 04/19/2018 136  136 - 145 mmol/L Final    Potassium 04/19/2018 5.3* 3.5 - 5.1 mmol/L Final    Chloride 04/19/2018 105  95 -  110 mmol/L Final    CO2 04/19/2018 23  23 - 29 mmol/L Final    Glucose 04/19/2018 118* 70 - 110 mg/dL Final    BUN, Bld 04/19/2018 17  6 - 20 mg/dL Final    Creatinine 04/19/2018 1.3  0.5 - 1.4 mg/dL Final    Calcium 04/19/2018 8.6* 8.7 - 10.5 mg/dL Final    Anion Gap 04/19/2018 8  8 - 16 mmol/L Final    eGFR if  04/19/2018 55* >60 mL/min/1.73 m^2 Final    eGFR if non African American 04/19/2018 48* >60 mL/min/1.73 m^2 Final    WBC 04/19/2018 11.30  3.90 - 12.70 K/uL Final    RBC 04/19/2018 4.75  4.00 - 5.40 M/uL Final    Hemoglobin 04/19/2018 12.8  12.0 - 16.0 g/dL Final    Hematocrit 04/19/2018 38.6  37.0 - 48.5 % Final    MCV 04/19/2018 81* 82 - 98 fL Final    MCH 04/19/2018 26.9* 27.0 - 31.0 pg Final    MCHC 04/19/2018 33.0  32.0 - 36.0 g/dL Final    RDW 04/19/2018 14.3  11.5 - 14.5 % Final    Platelets 04/19/2018 340  150 - 350 K/uL Final    MPV 04/19/2018 8.0* 9.2 - 12.9 fL Final    Gran # (ANC) 04/19/2018 9.8* 1.8 - 7.7 K/uL Final    Lymph # 04/19/2018 1.3  1.0 - 4.8 K/uL Final    Mono # 04/19/2018 0.0* 0.3 - 1.0 K/uL Final    Eos # 04/19/2018 0.1  0.0 - 0.5 K/uL Final    Baso # 04/19/2018 0.00  0.00 - 0.20 K/uL Final    Gran% 04/19/2018 86.9* 38.0 - 73.0 % Final    Lymph% 04/19/2018 11.8* 18.0 - 48.0 % Final    Mono% 04/19/2018 0.4* 4.0 - 15.0 % Final    Eosinophil% 04/19/2018 0.7  0.0 - 8.0 % Final    Basophil% 04/19/2018 0.2  0.0 - 1.9 % Final    Differential Method 04/19/2018 Automated   Final    Sodium 04/20/2018 135* 136 - 145 mmol/L Final    Potassium 04/20/2018 4.4  3.5 - 5.1 mmol/L Final    Chloride 04/20/2018 104  95 - 110 mmol/L Final    CO2 04/20/2018 24  23 - 29 mmol/L Final    Glucose 04/20/2018 128* 70 - 110 mg/dL Final    BUN, Bld 04/20/2018 10  6 - 20 mg/dL Final    Creatinine 04/20/2018 0.9  0.5 - 1.4 mg/dL Final    Calcium 04/20/2018 9.2  8.7 - 10.5 mg/dL Final    Anion Gap 04/20/2018 7* 8 - 16 mmol/L Final    eGFR if   04/20/2018 >60  >60 mL/min/1.73 m^2 Final    eGFR if non African American 04/20/2018 >60  >60 mL/min/1.73 m^2 Final    WBC 04/20/2018 14.20* 3.90 - 12.70 K/uL Final    RBC 04/20/2018 4.39  4.00 - 5.40 M/uL Final    Hemoglobin 04/20/2018 11.8* 12.0 - 16.0 g/dL Final    Hematocrit 04/20/2018 36.2* 37.0 - 48.5 % Final    MCV 04/20/2018 83  82 - 98 fL Final    MCH 04/20/2018 27.0  27.0 - 31.0 pg Final    MCHC 04/20/2018 32.7  32.0 - 36.0 g/dL Final    RDW 04/20/2018 14.4  11.5 - 14.5 % Final    Platelets 04/20/2018 336  150 - 350 K/uL Final    MPV 04/20/2018 7.8* 9.2 - 12.9 fL Final    Gran # (ANC) 04/20/2018 10.5* 1.8 - 7.7 K/uL Final    Lymph # 04/20/2018 2.5  1.0 - 4.8 K/uL Final    Mono # 04/20/2018 1.2* 0.3 - 1.0 K/uL Final    Eos # 04/20/2018 0.0  0.0 - 0.5 K/uL Final    Baso # 04/20/2018 0.00  0.00 - 0.20 K/uL Final    Gran% 04/20/2018 74.2* 38.0 - 73.0 % Final    Lymph% 04/20/2018 17.3* 18.0 - 48.0 % Final    Mono% 04/20/2018 8.1  4.0 - 15.0 % Final    Eosinophil% 04/20/2018 0.2  0.0 - 8.0 % Final    Basophil% 04/20/2018 0.2  0.0 - 1.9 % Final    Differential Method 04/20/2018 Automated   Final        Meds   Current Facility-Administered Medications   Medication Dose Route Frequency Provider Last Rate Last Dose    acetaminophen tablet 650 mg  650 mg Oral Q4H Markie Bang MD   650 mg at 04/20/18 1031    apixaban tablet 2.5 mg  2.5 mg Oral BID PAT Reid   2.5 mg at 04/20/18 0851    celecoxib capsule 100 mg  100 mg Oral BID Markie Bang MD   100 mg at 04/20/18 0851    celecoxib capsule 400 mg  400 mg Oral Once Perfecto Bishop MD        docusate sodium capsule 100 mg  100 mg Oral Q12H PAT Reid   100 mg at 04/20/18 0851    famotidine tablet 20 mg  20 mg Oral BID PAT Reid   20 mg at 04/20/18 0851    lactated ringers infusion   Intravenous Continuous PAT Reid 125 mL/hr at 04/20/18 0238      lisinopril tablet 10 mg  10 mg  Oral QHS PAT Reid   10 mg at 04/19/18 2108    loperamide capsule 2 mg  2 mg Oral Continuous PRN PAT Reid        mupirocin 2 % ointment 1 g  1 g Nasal BID PAT Reid   1 g at 04/20/18 0850    ondansetron disintegrating tablet 8 mg  8 mg Oral Q8H PRN PAT Reid        ondansetron injection 4 mg  4 mg Intravenous Q12H PRN PAT Reid        oxyCODONE immediate release tablet 10 mg  10 mg Oral Q3H PRN Perfecto Bishop MD        oxyCODONE immediate release tablet 15 mg  15 mg Oral Q3H PRN Perfecto Bishop MD        oxyCODONE immediate release tablet 5 mg  5 mg Oral Q3H PRN Perfecto Bishop MD        oxyCODONE immediate release tablet 5 mg  5 mg Oral Q4H PRN PAT Reid        oxyCODONE immediate release tablet Tab 10 mg  10 mg Oral Q4H PRN PAT Reid   10 mg at 04/19/18 2108    polyethylene glycol packet 17 g  17 g Oral Daily PAT Reid   17 g at 04/20/18 0849    pregabalin capsule 75 mg  75 mg Oral BID PAT Reid   75 mg at 04/20/18 0851    promethazine (PHENERGAN) 6.25 mg in dextrose 5 % 50 mL IVPB  6.25 mg Intravenous Q6H PRN Perfecto Bishop MD        ramelteon tablet 8 mg  8 mg Oral Nightly PRN PAT Reid        ropivacaine (NAROPIN) 0.2% ON-Q C-BLOC 750mL (med + pump)   Intravenous Continuous Perfecto Bishop MD 8 mL/hr at 04/19/18 0950      sodium chloride 0.9% flush 5 mL  5 mL Intravenous PRN PAT Reid                Assessment:     Pain control adequate    Plan:     Patient doing well, continue present treatment.    Evaluator Stanton RICO Cousin

## 2018-04-20 NOTE — PROGRESS NOTES
SSC informed by CONNOR Thomas patient will discharge home this weekend with CPM machine.  SSC spoke to Samantha with [facility] [phone] regarding MD orders.  Per Samantha patient will discharge with CPM that is already on patient from hospital and she will send staff for education today.  Per Samantha this SSC faxed patient information (facesheet, orders, h&p, op note) to [phone].  Fax confirmation received. Per Samantha, CM will fax discharge information once patient is ready for discharge.

## 2018-04-20 NOTE — PROGRESS NOTES
Progress Note  Hospital Medicine  Patient Name:Carola Blanco  MRN:  [id number]  Patient Class: IP- Inpatient  Admit Date: 4/19/2018  Length of Stay: 1 days  Expected Discharge Date:   Attending Physician: Guera Liu MD  Primary Care Provider:  Prashanth Cabrera NP    SUBJECTIVE:     Principal Problem: S/P total knee arthroplasty, left  Initial history of present illness: Patient is a 52 y.o. female admitted to Hospitalist Service from [facility] s/p left total knee arthroplasty performed by Dr. Bang. Patient reportedly has past medical history significant for OA and hypertension. Post-operatively, patient denied chest pain, shortness of breath, abdominal pain, nausea, vomiting, headache, vision changes, focal neuro-deficits, cough or fever.    PMH/PSH/SH/FH/Meds: reviewed.    Symptoms/Review of Systems:  Doing fine. Pain controlled. No shortness of breath, cough, chest pain or headache, fever or abdominal pain.     Diet:  Adequate intake.    Activity level: Normal.    Pain:  Patient reports no pain.       OBJECTIVE:   Vital Signs (Most Recent):      Temp: 98.7 °F (37.1 °C) (04/20/18 0801)  Pulse: 78 (04/20/18 0801)  Resp: 18 (04/20/18 0801)  BP: 129/60 (04/20/18 0801)  SpO2: 98 % (04/20/18 0801)       Vital Signs Range (Last 24H):  Temp:  [97.1 °F (36.2 °C)-98.9 °F (37.2 °C)]   Pulse:  []   Resp:  [15-22]   BP: ()/(46-81)   SpO2:  [93 %-100 %]     Weight: 115.7 kg (255 lb 1.2 oz)  Body mass index is 42.45 kg/m².    Intake/Output Summary (Last 24 hours) at 04/20/18 0852  Last data filed at 04/20/18 0700   Gross per 24 hour   Intake          3803.33 ml   Output             2600 ml   Net          1203.33 ml     Physical Examination:  General appearance: well developed, appears stated age  Head: normocephalic, atraumatic  Eyes:  conjunctivae/corneas clear. PERRL.  Nose: Nares normal. Septum midline.  Throat: lips, mucosa, and tongue normal; teeth and gums normal, no throat erythema.  Neck: supple, symmetrical,  trachea midline, no JVD and thyroid not enlarged, symmetric, no tenderness/mass/nodules  Lungs:  clear to auscultation bilaterally and normal respiratory effort  Chest wall: no tenderness  Heart: regular rate and rhythm, S1, S2 normal, no murmur, click, rub or gallop  Abdomen: soft, non-tender non-distented; bowel sounds normal; no masses,  no organomegaly  Extremities: no cyanosis, clubbing or edema. Left knee dressing C/D/I.  Pulses: 2+ and symmetric  Skin: Skin color, texture, turgor normal. No rashes or lesions.  Lymph nodes: Cervical, supraclavicular, and axillary nodes normal.  Neurologic: Normal strength and tone. No focal numbness or weakness. CNII-XII intact.     CBC:    Recent Labs  Lab 04/19/18  0945 04/20/18  0532   WBC 11.30 14.20*   RBC 4.75 4.39   HGB 12.8 11.8*   HCT 38.6 36.2*    336   MCV 81* 83   MCH 26.9* 27.0   MCHC 33.0 32.7   BMP    Recent Labs  Lab 04/19/18  0945 04/20/18  0532   * 128*    135*   K 5.3* 4.4    104   CO2 23 24   BUN 17 10   CREATININE 1.3 0.9   CALCIUM 8.6* 9.2      Diagnostic Results:  Microbiology Results (last 7 days)     ** No results found for the last 168 hours. **         Assessment/Plan:      *S/P total knee arthroplasty, left [Z96.652]   Not Applicable    Primary osteoarthritis of left knee [M17.12]  Continue to follow Orthopedic recommendations.  Needs aggressive incentive spirometry.  Follow hemoglobin and hematocrit closely.  Pain control with PO narcotics and antiemetics as needed.  Physical therapy as per Orthopedics protocol with fall precautions.      Yes    Hypertension [I10]  Continue chronic medications and monitor for any changes, adjusting as needed.       Expect DC home in am.    VTE Risk Mitigation         Ordered     apixaban tablet 2.5 mg  2 times daily      04/19/18 1052     IP VTE HIGH RISK PATIENT  Once      04/19/18 1052        Guera Liu MD  Department of Hospital Medicine   [facility]

## 2018-04-20 NOTE — PLAN OF CARE
04/20/18 1248   Discharge Assessment   Assessment Type Discharge Planning Reassessment   CM verified with Jose Armando STEWART that patient needs home CPM. Order placed.

## 2018-04-20 NOTE — PLAN OF CARE
Problem: Physical Therapy Goal  Goal: Physical Therapy Goal  Goals to be met by: 2018     Patient will increase functional independence with mobility by performin. Supine to sit with Stand-by Assistance  2. Sit to supine with Stand-by Assistance  3. Sit to stand transfer with Stand-by Assistance  4. Bed to chair transfer with Stand-by Assistance using Rolling Walker  5. Gait  x 250 feet with Stand-by Assistance using Rolling Walker.   6. Lower extremity exercise program x10 reps, with supervision     Outcome: Ongoing (interventions implemented as appropriate)  Bed mobility, transfers, gait with rw and Min A , WBAT LLE.

## 2018-04-20 NOTE — PROGRESS NOTES
04/19/18 2100   Patient Assessment/Suction   Level of Consciousness (AVPU) alert   Respiratory Effort Unlabored   Expansion/Accessory Muscles/Retractions no retractions;no use of accessory muscles   Rhythm/Pattern, Respiratory unlabored;no shortness of breath reported   Cough Frequency infrequent   Cough Type nonproductive   PRE-TX-O2-ETCO2   O2 Device (Oxygen Therapy) room air   SpO2 95 %   Pulse Oximetry Type Intermittent   $ Pulse Oximetry - Multiple Charge Pulse Oximetry - Multiple   Pulse 98   Resp 18   Incentive Spirometer   $ Incentive Spirometer Charges postop instruction;done with encouragement;breath hold utilized;proper technique demonstrated   Administration (Incentive Spirometer) done with encouragement   Number of Repetitions (Incentive Spirometer) 15   Level (mL) (Incentive Spirometer) 2000   Patient Tolerance good

## 2018-04-20 NOTE — PLAN OF CARE
Problem: Patient Care Overview  Goal: Plan of Care Review  Outcome: Ongoing (interventions implemented as appropriate)  Patient awake/alert x 4. VS stable this shift. POC reviewed with patient, patient verbalized understanding. Encouraged questions regarding care and concerns. IV antibiotics and fluids as ordered. Pain controlled with ordered medicatons.  Gesh drain and Q ball intact. Arnold catheter patent and draining clear, yellow urine to drainage bag.  Bed low and locked with rails up x 2 and call light in reach. Patient verbally denies any needs at this time. Will continue to monitor and adjust POC as needed.

## 2018-04-20 NOTE — PLAN OF CARE
Problem: Patient Care Overview  Goal: Plan of Care Review  Outcome: Ongoing (interventions implemented as appropriate)  Pt has remained free from injury this shift, she has ambulated to door and back to chair today with PT  She also sat up in the chair for most of the morning and afternoon, she is back in bed with her CPM machine in use.  Pulses good, pt does not complain of numbness or tingling, extremities warm.  She has scd and plexie pulse on. She has mckeon to gravity to be removed in the morning per md order. She has been medicated for pain this evening, she is tolerating food and drink, encouraged to call for assistance or needs with return demonstration on use of call light.

## 2018-04-20 NOTE — PT/OT/SLP PROGRESS
Physical Therapy Treatment    Patient Name:  Carola Blanco   MRN:  [id number]    Recommendations:     Discharge Recommendations:  home health PT   Discharge Equipment Recommendations: none   Barriers to discharge: None    Assessment:     Carola Blanco is a 52 y.o. female admitted with a medical diagnosis of S/P total knee arthroplasty, left.  She presents with the following impairments/functional limitations:  weakness, impaired endurance, impaired functional mobilty, gait instability, decreased lower extremity function, pain, orthopedic precautions .    Rehab Prognosis:  good; patient would benefit from acute skilled PT services to address these deficits and reach maximum level of function.      Recent Surgery: Procedure(s) (LRB):  ARTHROPLASTY-KNEE (Left) 1 Day Post-Op    Plan:     During this hospitalization, patient to be seen BID (daily Sat and Sun) to address the above listed problems via gait training, therapeutic activities, therapeutic exercises  · Plan of Care Expires:  05/18/18   Plan of Care Reviewed with: patient, spouse    Subjective     Communicated with nurse Susie prior to session.  Patient found seated in chair upon PT entry to room, agreeable to treatment.      Chief Complaint: pain with movement LLE  Patient comments/goals: to return home  Pain/Comfort:  · Pain Rating 1: 5/10  · Location - Side 1: Left  · Location - Orientation 1: generalized  · Location 1: knee  · Pain Addressed 1: Reposition, Cessation of Activity, Nurse notified    Patients cultural, spiritual, Alevism conflicts given the current situation: None    Objective:     Patient found with: perineural catheter, mckeon catheter (NIRMAL drain)     General Precautions: Standard, fall   Orthopedic Precautions:LLE weight bearing as tolerated   Braces: Knee immobilizer     Functional Mobility:  · Bed Mobility:     · Rolling Right: minimum assistance  · Sit to Supine: maximal assistance  · Transfers:     · Sit to Stand:  maximal assistance with rolling  walker  · Gait: 40' with rw and Min A, WBAT LLE with KI in place.      AM-PAC 6 CLICK MOBILITY          Therapeutic Activities and Exercises:   KI applied. Stood with increased A and verbal cues for technique. Ambulated with rw and Min A. Returned to bed with Max A. All lines attached and CPM applied and increased to 35 degrees flexion. Nurse Susie informed.    Patient left supine with all lines intact, call button in reach, nurse Susie notified and spouse present..    GOALS:    Physical Therapy Goals        Problem: Physical Therapy Goal    Goal Priority Disciplines Outcome Goal Variances Interventions   Physical Therapy Goal     PT/OT, PT      Description:  Goals to be met by: 2018     Patient will increase functional independence with mobility by performin. Supine to sit with Stand-by Assistance  2. Sit to supine with Stand-by Assistance  3. Sit to stand transfer with Stand-by Assistance  4. Bed to chair transfer with Stand-by Assistance using Rolling Walker  5. Gait  x 250 feet with Stand-by Assistance using Rolling Walker.   6. Lower extremity exercise program x10 reps, with supervision                      Time Tracking:     PT Received On: 18  PT Start Time: 1350     PT Stop Time: 1405  PT Total Time (min): 15 min     Billable Minutes: Gait Training 10min and Therapeutic Activity 5min    Treatment Type: Treatment  PT/PTA: PTA     PTA Visit Number: 1     Rosi Hilton PTA  2018

## 2018-04-20 NOTE — SUBJECTIVE & OBJECTIVE
"Principal Problem:S/P total knee arthroplasty, left    Principal Orthopedic Problem: S/P L TKA    Interval History: none    Review of patient's allergies indicates:  No Known Allergies    Current Facility-Administered Medications   Medication    acetaminophen tablet 650 mg    apixaban tablet 2.5 mg    cefazolin (ANCEF) 2 gram in dextrose 5% 50 mL IVPB (premix)    celecoxib capsule 100 mg    celecoxib capsule 400 mg    docusate sodium capsule 100 mg    famotidine tablet 20 mg    lactated ringers infusion    lisinopril tablet 10 mg    loperamide capsule 2 mg    mupirocin 2 % ointment 1 g    ondansetron disintegrating tablet 8 mg    ondansetron injection 4 mg    oxyCODONE immediate release tablet 10 mg    oxyCODONE immediate release tablet 15 mg    oxyCODONE immediate release tablet 5 mg    oxyCODONE immediate release tablet 5 mg    oxyCODONE immediate release tablet Tab 10 mg    polyethylene glycol packet 17 g    pregabalin capsule 75 mg    promethazine (PHENERGAN) 6.25 mg in dextrose 5 % 50 mL IVPB    ramelteon tablet 8 mg    ropivacaine (NAROPIN) 0.2% ON-Q C-BLOC 750mL (med + pump)    sodium chloride 0.9% flush 5 mL     Objective:     Vital Signs (Most Recent):  Temp: 97.1 °F (36.2 °C) (04/20/18 0440)  Pulse: 82 (04/20/18 0440)  Resp: 20 (04/20/18 0440)  BP: 116/65 (04/20/18 0440)  SpO2: 97 % (04/20/18 0440) Vital Signs (24h Range):  Temp:  [97.1 °F (36.2 °C)-98.9 °F (37.2 °C)] 97.1 °F (36.2 °C)  Pulse:  [] 82  Resp:  [15-22] 20  SpO2:  [93 %-100 %] 97 %  BP: ()/(46-81) 116/65     Weight: 115.7 kg (255 lb 1.2 oz)  Height: 5' 5" (165.1 cm)  Body mass index is 42.45 kg/m².      Intake/Output Summary (Last 24 hours) at 04/20/18 0704  Last data filed at 04/19/18 1840   Gross per 24 hour   Intake           4227.5 ml   Output             1000 ml   Net           3227.5 ml       General    Vitals reviewed.  Constitutional: She is oriented to person, place, and time.   Obese "   Pulmonary/Chest: Effort normal.   Abdominal: Soft. Bowel sounds are normal.   Neurological: She is alert and oriented to person, place, and time.   Psychiatric: She has a normal mood and affect. Her behavior is normal.             Left Knee Exam     Comments:  LLE DNVI. Dressing clean and dry      Significant Labs:   CBC:   Recent Labs  Lab 04/19/18  0945 04/20/18  0532   WBC 11.30 14.20*   HGB 12.8 11.8*   HCT 38.6 36.2*    336     CMP:   Recent Labs  Lab 04/19/18  0945 04/20/18  0532    135*   K 5.3* 4.4    104   CO2 23 24   * 128*   BUN 17 10   CREATININE 1.3 0.9   CALCIUM 8.6* 9.2   ANIONGAP 8 7*   EGFRNONAA 48* >60     All pertinent labs within the past 24 hours have been reviewed.    Significant Imaging: Status post left knee arthroplasty without complication.

## 2018-04-20 NOTE — ASSESSMENT & PLAN NOTE
OOB with PT and nursing  DC NIRMAL and change dressing tomorrow 0600  Plan for DC home tomorrow afternoon with HH

## 2018-04-21 VITALS
HEIGHT: 65 IN | WEIGHT: 255.06 LBS | OXYGEN SATURATION: 98 % | BODY MASS INDEX: 42.49 KG/M2 | TEMPERATURE: 98 F | RESPIRATION RATE: 16 BRPM | SYSTOLIC BLOOD PRESSURE: 134 MMHG | HEART RATE: 82 BPM | DIASTOLIC BLOOD PRESSURE: 64 MMHG

## 2018-04-21 LAB
ANION GAP SERPL CALC-SCNC: 7 MMOL/L
BASOPHILS # BLD AUTO: 0 K/UL
BASOPHILS NFR BLD: 0.4 %
BUN SERPL-MCNC: 11 MG/DL
CALCIUM SERPL-MCNC: 9.1 MG/DL
CHLORIDE SERPL-SCNC: 103 MMOL/L
CO2 SERPL-SCNC: 28 MMOL/L
CREAT SERPL-MCNC: 0.8 MG/DL
DIFFERENTIAL METHOD: ABNORMAL
EOSINOPHIL # BLD AUTO: 0.5 K/UL
EOSINOPHIL NFR BLD: 4.3 %
ERYTHROCYTE [DISTWIDTH] IN BLOOD BY AUTOMATED COUNT: 14.6 %
EST. GFR  (AFRICAN AMERICAN): >60 ML/MIN/1.73 M^2
EST. GFR  (NON AFRICAN AMERICAN): >60 ML/MIN/1.73 M^2
GLUCOSE SERPL-MCNC: 108 MG/DL
HCT VFR BLD AUTO: 37.3 %
HGB BLD-MCNC: 12.3 G/DL
LYMPHOCYTES # BLD AUTO: 2.4 K/UL
LYMPHOCYTES NFR BLD: 20.3 %
MCH RBC QN AUTO: 27.2 PG
MCHC RBC AUTO-ENTMCNC: 33.1 G/DL
MCV RBC AUTO: 82 FL
MONOCYTES # BLD AUTO: 0.9 K/UL
MONOCYTES NFR BLD: 8 %
NEUTROPHILS # BLD AUTO: 7.8 K/UL
NEUTROPHILS NFR BLD: 67 %
PLATELET # BLD AUTO: 313 K/UL
PMV BLD AUTO: 7.9 FL
POTASSIUM SERPL-SCNC: 4.5 MMOL/L
RBC # BLD AUTO: 4.53 M/UL
SODIUM SERPL-SCNC: 138 MMOL/L
WBC # BLD AUTO: 11.6 K/UL

## 2018-04-21 PROCEDURE — 94799 UNLISTED PULMONARY SVC/PX: CPT

## 2018-04-21 PROCEDURE — 97116 GAIT TRAINING THERAPY: CPT

## 2018-04-21 PROCEDURE — 97110 THERAPEUTIC EXERCISES: CPT

## 2018-04-21 PROCEDURE — 25000003 PHARM REV CODE 250: Performed by: ORTHOPAEDIC SURGERY

## 2018-04-21 PROCEDURE — 80048 BASIC METABOLIC PNL TOTAL CA: CPT

## 2018-04-21 PROCEDURE — 97530 THERAPEUTIC ACTIVITIES: CPT

## 2018-04-21 PROCEDURE — 25000003 PHARM REV CODE 250: Performed by: PHYSICIAN ASSISTANT

## 2018-04-21 PROCEDURE — 85025 COMPLETE CBC W/AUTO DIFF WBC: CPT

## 2018-04-21 PROCEDURE — 36415 COLL VENOUS BLD VENIPUNCTURE: CPT

## 2018-04-21 PROCEDURE — 25000003 PHARM REV CODE 250: Performed by: ANESTHESIOLOGY

## 2018-04-21 PROCEDURE — 99238 HOSP IP/OBS DSCHRG MGMT 30/<: CPT | Mod: ,,, | Performed by: INTERNAL MEDICINE

## 2018-04-21 PROCEDURE — 94761 N-INVAS EAR/PLS OXIMETRY MLT: CPT

## 2018-04-21 RX ORDER — OXYCODONE AND ACETAMINOPHEN 10; 325 MG/1; MG/1
1 TABLET ORAL EVERY 6 HOURS PRN
Qty: 12 TABLET | Refills: 0 | Status: SHIPPED | OUTPATIENT
Start: 2018-04-21 | End: 2018-04-25

## 2018-04-21 RX ADMIN — MUPIROCIN 1 G: 20 OINTMENT TOPICAL at 08:04

## 2018-04-21 RX ADMIN — APIXABAN 2.5 MG: 2.5 TABLET, FILM COATED ORAL at 08:04

## 2018-04-21 RX ADMIN — ACETAMINOPHEN 650 MG: 325 TABLET ORAL at 05:04

## 2018-04-21 RX ADMIN — ACETAMINOPHEN 650 MG: 325 TABLET ORAL at 02:04

## 2018-04-21 RX ADMIN — FAMOTIDINE 20 MG: 20 TABLET, FILM COATED ORAL at 08:04

## 2018-04-21 RX ADMIN — POLYETHYLENE GLYCOL 3350 17 G: 17 POWDER, FOR SOLUTION ORAL at 08:04

## 2018-04-21 RX ADMIN — PREGABALIN 75 MG: 75 CAPSULE ORAL at 08:04

## 2018-04-21 RX ADMIN — OXYCODONE HYDROCHLORIDE 15 MG: 5 TABLET ORAL at 08:04

## 2018-04-21 RX ADMIN — DOCUSATE SODIUM 100 MG: 100 CAPSULE, LIQUID FILLED ORAL at 08:04

## 2018-04-21 RX ADMIN — CELECOXIB 100 MG: 100 CAPSULE ORAL at 08:04

## 2018-04-21 NOTE — ANESTHESIA POST-OP PAIN MANAGEMENT
Acute Pain Service Progress Note    Carola Blanco is a 52 y.o., female, [id number].    Surgery:  L TKR    Post Op Day #: 2    Catheter type: perineural  femoral adductor canal    Infusion type: Ropivacaine 0.2%  8 basal    Problem List:    Active Hospital Problems    Diagnosis  POA    *S/P total knee arthroplasty, left [Z96.652]  Not Applicable    Primary osteoarthritis of left knee [M17.12]  Yes    Hypertension [I10]  Yes      Resolved Hospital Problems    Diagnosis Date Resolved POA   No resolved problems to display.       Subjective:    Pt comfortable and c/o pain 1-2/10.    Assessment:     Pain control adequate    LEONARDA Noguera,     Plan:     Patient doing well, continue present treatment. Will DC block later today or early tomorrow morning once empty and detailed instructions given for doing this.    Evaluator Sekou Carey

## 2018-04-21 NOTE — PROGRESS NOTES
"[facility]  Orthopedics  Progress Note    Patient Name: Carola Blanco  MRN: [id number]  Admission Date: 4/19/2018  Hospital Length of Stay: 2 days  Attending Provider: Guera Liu MD  Primary Care Provider: Prashanth Cabrrea NP  Follow-up For: Procedure(s) (LRB):  ARTHROPLASTY-KNEE (Left)    Post-Operative Day: 2 Days Post-Op  Subjective:     Principal Problem:S/P total knee arthroplasty, left    Principal Orthopedic Problem: S/P L TKA    Interval History: none    Review of patient's allergies indicates:  No Known Allergies    Current Facility-Administered Medications   Medication    acetaminophen tablet 650 mg    apixaban tablet 2.5 mg    celecoxib capsule 100 mg    celecoxib capsule 400 mg    docusate sodium capsule 100 mg    famotidine tablet 20 mg    lactated ringers infusion    lisinopril tablet 10 mg    loperamide capsule 2 mg    mupirocin 2 % ointment 1 g    ondansetron disintegrating tablet 8 mg    ondansetron injection 4 mg    oxyCODONE immediate release tablet 10 mg    oxyCODONE immediate release tablet 15 mg    oxyCODONE immediate release tablet 5 mg    oxyCODONE immediate release tablet 5 mg    oxyCODONE immediate release tablet Tab 10 mg    polyethylene glycol packet 17 g    pregabalin capsule 75 mg    promethazine (PHENERGAN) 6.25 mg in dextrose 5 % 50 mL IVPB    ramelteon tablet 8 mg    ropivacaine (NAROPIN) 0.2% ON-Q C-BLOC 750mL (med + pump)    sodium chloride 0.9% flush 5 mL     Objective:     Vital Signs (Most Recent):  Temp: 97.8 °F (36.6 °C) (04/21/18 0737)  Pulse: 82 (04/21/18 0737)  Resp: 16 (04/21/18 0737)  BP: 134/64 (04/21/18 0737)  SpO2: 96 % (04/21/18 0737) Vital Signs (24h Range):  Temp:  [96.2 °F (35.7 °C)-99.7 °F (37.6 °C)] 97.8 °F (36.6 °C)  Pulse:  [76-92] 82  Resp:  [14-20] 16  SpO2:  [96 %-97 %] 96 %  BP: (109-135)/(58-82) 134/64     Weight: 115.7 kg (255 lb 1.2 oz)  Height: 5' 5" (165.1 cm)  Body mass index is 42.45 kg/m².      Intake/Output Summary " (Last 24 hours) at 04/21/18 0909  Last data filed at 04/21/18 0615   Gross per 24 hour   Intake             1140 ml   Output             2625 ml   Net            -1485 ml       General    Vitals reviewed.  Constitutional: She is oriented to person, place, and time.   Obese   Pulmonary/Chest: Effort normal.   Abdominal: Soft. Bowel sounds are normal.   Neurological: She is alert and oriented to person, place, and time.   Psychiatric: She has a normal mood and affect. Her behavior is normal.             Left Knee Exam     Comments:  LLE DNVI. Mild bloody drainage on dressing and where NIRMAL was removed      Significant Labs:   CBC:   Recent Labs  Lab 04/19/18  0945 04/20/18  0532 04/21/18  0654   WBC 11.30 14.20* 11.60   HGB 12.8 11.8* 12.3   HCT 38.6 36.2* 37.3    336 313     CMP:   Recent Labs  Lab 04/19/18  0945 04/20/18  0532 04/21/18  0654    135* 138   K 5.3* 4.4 4.5    104 103   CO2 23 24 28   * 128* 108   BUN 17 10 11   CREATININE 1.3 0.9 0.8   CALCIUM 8.6* 9.2 9.1   ANIONGAP 8 7* 7*   EGFRNONAA 48* >60 >60     All pertinent labs within the past 24 hours have been reviewed.    Significant Imaging: None    Assessment/Plan:     * S/P total knee arthroplasty, left    Stable from an ortho stand point and may be DC'd home with HH today              PAT PATRICIO  Orthopedics  [facility]

## 2018-04-21 NOTE — NURSING
Pt will not be taking the apixaban RX home with her to fill, she states she does not want to pay that much for the drug, I called Jose Armando Josue to inform him and he stated she could take 325 mg of asa twice a day for 21 days, I called Dr. Tsai and informed him of the changes and he said he would put it in his discharge summary. RX was placed in shred box

## 2018-04-21 NOTE — SUBJECTIVE & OBJECTIVE
"Principal Problem:S/P total knee arthroplasty, left    Principal Orthopedic Problem: S/P L TKA    Interval History: none    Review of patient's allergies indicates:  No Known Allergies    Current Facility-Administered Medications   Medication    acetaminophen tablet 650 mg    apixaban tablet 2.5 mg    celecoxib capsule 100 mg    celecoxib capsule 400 mg    docusate sodium capsule 100 mg    famotidine tablet 20 mg    lactated ringers infusion    lisinopril tablet 10 mg    loperamide capsule 2 mg    mupirocin 2 % ointment 1 g    ondansetron disintegrating tablet 8 mg    ondansetron injection 4 mg    oxyCODONE immediate release tablet 10 mg    oxyCODONE immediate release tablet 15 mg    oxyCODONE immediate release tablet 5 mg    oxyCODONE immediate release tablet 5 mg    oxyCODONE immediate release tablet Tab 10 mg    polyethylene glycol packet 17 g    pregabalin capsule 75 mg    promethazine (PHENERGAN) 6.25 mg in dextrose 5 % 50 mL IVPB    ramelteon tablet 8 mg    ropivacaine (NAROPIN) 0.2% ON-Q C-BLOC 750mL (med + pump)    sodium chloride 0.9% flush 5 mL     Objective:     Vital Signs (Most Recent):  Temp: 97.8 °F (36.6 °C) (04/21/18 0737)  Pulse: 82 (04/21/18 0737)  Resp: 16 (04/21/18 0737)  BP: 134/64 (04/21/18 0737)  SpO2: 96 % (04/21/18 0737) Vital Signs (24h Range):  Temp:  [96.2 °F (35.7 °C)-99.7 °F (37.6 °C)] 97.8 °F (36.6 °C)  Pulse:  [76-92] 82  Resp:  [14-20] 16  SpO2:  [96 %-97 %] 96 %  BP: (109-135)/(58-82) 134/64     Weight: 115.7 kg (255 lb 1.2 oz)  Height: 5' 5" (165.1 cm)  Body mass index is 42.45 kg/m².      Intake/Output Summary (Last 24 hours) at 04/21/18 0909  Last data filed at 04/21/18 0615   Gross per 24 hour   Intake             1140 ml   Output             2625 ml   Net            -1485 ml       General    Vitals reviewed.  Constitutional: She is oriented to person, place, and time.   Obese   Pulmonary/Chest: Effort normal.   Abdominal: Soft. Bowel sounds are normal. "   Neurological: She is alert and oriented to person, place, and time.   Psychiatric: She has a normal mood and affect. Her behavior is normal.             Left Knee Exam     Comments:  LLE DNVI. Mild bloody drainage on dressing and where NIRMAL was removed      Significant Labs:   CBC:   Recent Labs  Lab 04/19/18  0945 04/20/18  0532 04/21/18  0654   WBC 11.30 14.20* 11.60   HGB 12.8 11.8* 12.3   HCT 38.6 36.2* 37.3    336 313     CMP:   Recent Labs  Lab 04/19/18  0945 04/20/18  0532 04/21/18  0654    135* 138   K 5.3* 4.4 4.5    104 103   CO2 23 24 28   * 128* 108   BUN 17 10 11   CREATININE 1.3 0.9 0.8   CALCIUM 8.6* 9.2 9.1   ANIONGAP 8 7* 7*   EGFRNONAA 48* >60 >60     All pertinent labs within the past 24 hours have been reviewed.    Significant Imaging: None

## 2018-04-21 NOTE — DISCHARGE SUMMARY
[facility] Hospital Medicine  Discharge Summary      Patient Name: Carola Blanco  MRN: [id number]  Admission Date: 4/19/2018  Hospital Length of Stay: 2 days  Discharge Date and Time:  04/21/2018 1:50 PM  Attending Physician: Guera Liu MD   Discharging Provider: Herbert Tsai MD  Primary Care Provider: Prashanth Cabrera NP        HPI: Patient is a 52 y.o. female admitted to Hospitalist Service from [facility] s/p left total knee arthroplasty performed by Dr. Bang. Patient reportedly has past medical history significant for OA and hypertension. Post-operatively, patient denied chest pain, shortness of breath, abdominal pain, nausea, vomiting, headache, vision changes, focal neuro-deficits, cough or fever.    Procedure(s) (LRB):  ARTHROPLASTY-KNEE (Left)      Hospital Course: The patient was admitted and underwent successful left total knee arthroplasty without complication. She was discharged with home health and will take 325 ASA BID for 21 days.     Physical Exam on day of discharge:  General: AAOx3, NAD  CV: rrr, no m/r/g  Pulm: CTAB  Abd: s/nt/nd +bs    Consults:   Consults         Status Ordering Provider     Inpatient consult to Anesthesiology  Once     Provider:  Guera Liu MD    Acknowledged NUHA CASTRO     Inpatient consult to Social Work/Case Management  Once     Provider:  (Not yet assigned)    NUHA Thao          Final Active Diagnoses:    Diagnosis Date Noted POA    PRINCIPAL PROBLEM:  S/P total knee arthroplasty, left [Z96.652] 04/19/2018 Not Applicable    Primary osteoarthritis of left knee [M17.12] 04/19/2018 Yes    Hypertension [I10] 04/19/2018 Yes      Problems Resolved During this Admission:    Diagnosis Date Noted Date Resolved POA      Discharged Condition: stable    Disposition: Home or Self Care    Follow Up:  Follow-up Information     Markie Bang MD On 5/2/2018.    Specialties:  Orthopedic Surgery, General Surgery, Surgery  Why:  post op  "followup 5/2 @ 145pm  Contact information:  [address]  [address]  [place] [address]  [phone]             [facility].    Why:  DME-CPM Machine  Contact information:  [phone]           [facility].    Specialty:  Emergency Medicine  Contact information:  [address]  [facility] [address] [phone]           Prashanth Cabrera NP In 2 weeks.    Specialty:  Family Medicine  Contact information:  [address]  [place] [address]  [phone]                 Patient Instructions:     CPM FOR HOME USE   Order Comments: Start at full extension and 30 degrees of flexion and increase daily as tolerated. CPM for 6h daily.   Order Specific Question Answer Comments   Height: 5' 5" (1.651 m)    Weight: 115.7 kg (255 lb 1.2 oz)    Does patient have medical equipment at home? walker, rolling    Does patient have medical equipment at home? shower chair    Does patient have medical equipment at home? bedside commode    Length of need (1-99 months): 1      Ambulatory referral to Home Health   Referral Priority: Routine Referral Type: Home Health   Referral Reason: Specialty Services Required    Requested Specialty: Home Health Services    Number of Visits Requested: 1      Referral to Home health   Referral Priority: Routine Referral Type: Home Health Care   Referral Reason: Specialty Services Required    Requested Specialty: Home Health Services    Number of Visits Requested: 1      Activity as tolerated     Notify your health care provider if you experience any of the following:  redness, tenderness, or signs of infection (pain, swelling, redness, odor or green/yellow discharge around incision site)     Notify your health care provider if you experience any of the following:  difficulty breathing or increased cough       Medications:  Reconciled Home Medications:      Medication List      START taking these medications    apixaban 2.5 mg Tab  Take 1 tablet (2.5 mg total) " by mouth 2 (two) times daily.     oxyCODONE-acetaminophen  mg per tablet  Commonly known as:  PERCOCET  Take 1 tablet by mouth every 6 (six) hours as needed for Pain.        CONTINUE taking these medications    diclofenac sodium 1 % Gel  Commonly known as:  VOLTAREN  Apply 4 g topically 4 (four) times daily.     ibuprofen 100 MG tablet  Commonly known as:  ADVIL,MOTRIN  Take 100 mg by mouth every 6 (six) hours as needed for Temperature greater than.     lisinopril 10 MG tablet  Take 1 tablet (10 mg total) by mouth every evening.     metronidazole 1% 1 % Gel  Commonly known as:  METROGEL  Apply topically once daily.     naproxen 500 MG tablet  Commonly known as:  NAPROSYN  Take 500 mg by mouth 3 (three) times daily as needed.            Significant Diagnostic Studies: Labs:   CBC   Recent Labs  Lab 04/20/18  0532 04/21/18  0654   WBC 14.20* 11.60   HGB 11.8* 12.3   HCT 36.2* 37.3    313       Pending Diagnostic Studies:     None        Indwelling Lines/Drains at time of discharge:   Lines/Drains/Airways     Epidural Line                 Perineural Analgesia/Anesthesia Assessment (using dermatomes) Epidural 04/19/18 0641 2 days                Time spent on the discharge of patient: 25 minutes  Patient was seen and examined on the date of discharge and determined to be suitable for discharge.         Herbert Tsai MD  Department of Hospital Medicine  [facility]

## 2018-04-21 NOTE — DISCHARGE INSTRUCTIONS
Thank you for choosing [facility] for your medical care. The primary doctor who is taking care of you at the time of your discharge is Guera Liu MD.     You were admitted to the hospital with S/P total knee arthroplasty, left.     Please note your discharge instructions, including diet/activity restrictions, follow-up appointments, and medication changes.  If you have any questions about your medical issues, prescriptions, or any other questions, please feel free to contact the [facility] Medicine Dept at [phone] and we will help.    If you are previously with Home health, outpatient PT/OT or under a therapy program, you are cleared to return to those programs.    Please direct all long term medication refills and follow up to your primary care provider, Prashanth Cabrera NP. Thank you again for letting us take care of your health care needs.    Please note the following discharge instructions per your discharging physician-  1. Take all medications as directed  2. Follow up with Dr Bang Monday

## 2018-04-21 NOTE — ADDENDUM NOTE
Addendum  created 04/21/18 0954 by Sekou Carey MD    Anesthesia Event edited, Sign clinical note

## 2018-04-21 NOTE — PLAN OF CARE
04/21/18 1143   Discharge Assessment   Assessment Type Discharge Planning Reassessment   CM sent dc orders and CPM order to [facility] via fax  @ [phone] as requested per Samantha. CM sent HH referral via [facility] to [facility]. CM informed Susie, floor nurse that patient is supposed to take CPM machine that is in hospital room home with her. Susie verbalized understanding. MARCOS also called in referral to [facility] at [phone].     1200 CM received call back from Viktoria, intake nurse at [facility], she will have to check with her  to see if she can accept patient. She asked if patient could wait to be seen until Tuesday 4/24, CM informed her that patient is ortho and had knee replacement surgery and will need to be seen on Sunday 4/22. She also asked if patient could only be seen by PT, CM informed her that both SN and PT are ordered. CM will followup.     1250pm CM received call from [facility]

## 2018-04-21 NOTE — PROGRESS NOTES
04/20/18 1938   Patient Assessment/Suction   Level of Consciousness (AVPU) alert   Respiratory Effort Unlabored   PRE-TX-O2-ETCO2   O2 Device (Oxygen Therapy) room air   Incentive Spirometer   $ Incentive Spirometer Charges done with encouragement   Administration (Incentive Spirometer) done with encouragement   Number of Repetitions (Incentive Spirometer) 10   Level (mL) (Incentive Spirometer) 1500   Patient Tolerance good

## 2018-04-21 NOTE — PLAN OF CARE
Problem: Physical Therapy Goal  Goal: Physical Therapy Goal  Goals to be met by: 2018     Patient will increase functional independence with mobility by performin. Supine to sit with Stand-by Assistance  2. Sit to supine with Stand-by Assistance  3. Sit to stand transfer with Stand-by Assistance  4. Bed to chair transfer with Stand-by Assistance using Rolling Walker  5. Gait  x 250 feet with Stand-by Assistance using Rolling Walker.   6. Lower extremity exercise program x10 reps, with supervision     Outcome: Ongoing (interventions implemented as appropriate)  PT for bed mobility, transfer training, gait training and therex

## 2018-04-21 NOTE — PLAN OF CARE
04/21/18 1251   Final Note   Assessment Type Final Discharge Note   Discharge Disposition Home-Health  ([place])

## 2018-04-21 NOTE — PT/OT/SLP PROGRESS
"Physical Therapy Treatment    Patient Name:  Carola Blanco   MRN:  [id number]    Recommendations:     Discharge Recommendations:  home health PT       Assessment:     Carola Blanco is a 52 y.o. female admitted with a medical diagnosis of S/P total knee arthroplasty, left.  She presents with the following impairments/functional limitations:  weakness, impaired endurance, impaired functional mobilty, gait instability, decreased lower extremity function, decreased ROM, impaired joint extensibility, orthopedic precautions .    Rehab Prognosis:  good; patient would benefit from acute skilled PT services to address these deficits and reach maximum level of function.      Recent Surgery: Procedure(s) (LRB):  ARTHROPLASTY-KNEE (Left) 2 Days Post-Op    Plan:     During this hospitalization, patient to be seen BID (daily Sat and Sun) to address the above listed problems via gait training, therapeutic activities, therapeutic exercises  · Plan of Care Expires:  05/18/18   Plan of Care Reviewed with: patient    Subjective     Communicated with nurse Susie prior to session.  Patient found supine upon PT entry to room, agreeable to treatment.      Chief Complaint: L knee pain and overall discomfort from being in bed  Patient comments/goals: pt eager to get OOB, stating she felt better today  Pain/Comfort:  · Pain Rating 1: 4/10 ("3-4/10")  · Location - Side 1: Left  · Location 1: knee  · Pain Addressed 1: Pre-medicate for activity, Reposition, Distraction, Cessation of Activity, Nurse notified    Patients cultural, spiritual, Caodaism conflicts given the current situation: None    Objective:     Patient found with: perineural catheter, cryotherapy, SCD     General Precautions: Standard, fall   Orthopedic Precautions:LLE weight bearing as tolerated   Braces: Knee immobilizer (Donned prior to oob)    Functional Mobility:  · Bed Mobility:     · Scooting: minimum assistance  · Supine to Sit: minimum assistance    · Transfers:     · Sit to " Stand:  contact guard assistance with rolling walker    · Gait: 60' with CGA using RW. cueing for tech, safety with turns and energy conservation. brief standing break taken         Therapeutic Activities and Exercises:   pt assisted to bedside chair following gait     Seated BLE therex: AP x 10, quad sets and SLR x 10 reps with AAROM for L LE    Patient left up in chair with all lines intact, call button in reach, nurse Susie notified and spouse present..    GOALS:    Physical Therapy Goals        Problem: Physical Therapy Goal    Goal Priority Disciplines Outcome Goal Variances Interventions   Physical Therapy Goal     PT/OT, PT Ongoing (interventions implemented as appropriate)     Description:  Goals to be met by: 2018     Patient will increase functional independence with mobility by performin. Supine to sit with Stand-by Assistance  2. Sit to supine with Stand-by Assistance  3. Sit to stand transfer with Stand-by Assistance  4. Bed to chair transfer with Stand-by Assistance using Rolling Walker  5. Gait  x 250 feet with Stand-by Assistance using Rolling Walker.   6. Lower extremity exercise program x10 reps, with supervision                      Time Tracking:     PT Received On: 18  PT Start Time: 835     PT Stop Time: 904  PT Total Time (min): 29 min     Billable Minutes: Gait Training 10, Therapeutic Activity 8 and Therapeutic Exercise 8    Treatment Type: Treatment  PT/PTA: PTA     PTA Visit Number: 2     Miroslava Hays, PTA  2018

## 2018-04-21 NOTE — PLAN OF CARE
Problem: Patient Care Overview  Goal: Plan of Care Review  Outcome: Ongoing (interventions implemented as appropriate)  AAOx4. Arnold in place, intact, draining clear yellow urine. Pain controlled with prn and sched medications. Teds and SCDs maintained. CPM removed at 2200, knee immobilizer placed. Pt slept well through the night. Significant other at bedside. Q2 hour rounding utilized. Pain and comfort assessed. Bed low, brakes locked, SR up, call light within reach. POC reviewed. Pt verbalized understanding. Will continue to monitor.

## 2018-04-23 DIAGNOSIS — Z96.659 STATUS POST KNEE REPLACEMENT, UNSPECIFIED LATERALITY: Primary | ICD-10-CM

## 2018-04-23 RX ORDER — OXYCODONE AND ACETAMINOPHEN 10; 325 MG/1; MG/1
1 TABLET ORAL EVERY 6 HOURS PRN
Qty: 28 TABLET | Refills: 0 | Status: SHIPPED | OUTPATIENT
Start: 2018-04-23 | End: 2018-05-02 | Stop reason: SDUPTHER

## 2018-04-23 NOTE — TELEPHONE ENCOUNTER
----- Message from Abril Talavera sent at 4/23/2018 12:08 PM CDT -----  Contact: Home Health  They need pain medication, were not prescribed any when they left the hospital and what is wound care instructions, call [phone] [facility].

## 2018-04-25 ENCOUNTER — TELEPHONE (OUTPATIENT)
Dept: ORTHOPEDICS | Facility: CLINIC | Age: 53
End: 2018-04-25

## 2018-04-25 NOTE — TELEPHONE ENCOUNTER
Spoke with teresa    ----- Message from Abril Talavera sent at 4/24/2018  9:14 AM CDT -----  Contact: Patient's daughter  [phone] Teresa is calling for her, her daughter, they want to know if the oxycodone can be changed to every 4 hours instead of every 6. Also she is having trouble sleeping, is there anything we can suggest?

## 2018-05-02 ENCOUNTER — OFFICE VISIT (OUTPATIENT)
Dept: ORTHOPEDICS | Facility: CLINIC | Age: 53
End: 2018-05-02
Payer: COMMERCIAL

## 2018-05-02 VITALS
HEIGHT: 65 IN | DIASTOLIC BLOOD PRESSURE: 90 MMHG | WEIGHT: 250 LBS | SYSTOLIC BLOOD PRESSURE: 149 MMHG | BODY MASS INDEX: 41.65 KG/M2 | HEART RATE: 80 BPM

## 2018-05-02 DIAGNOSIS — G47.01 INSOMNIA DUE TO MEDICAL CONDITION: Primary | ICD-10-CM

## 2018-05-02 DIAGNOSIS — Z96.652 STATUS POST TOTAL LEFT KNEE REPLACEMENT: ICD-10-CM

## 2018-05-02 DIAGNOSIS — Z96.652 S/P TOTAL KNEE ARTHROPLASTY, LEFT: ICD-10-CM

## 2018-05-02 DIAGNOSIS — Z96.659 STATUS POST KNEE REPLACEMENT, UNSPECIFIED LATERALITY: ICD-10-CM

## 2018-05-02 PROCEDURE — 99024 POSTOP FOLLOW-UP VISIT: CPT | Mod: ,,, | Performed by: PHYSICIAN ASSISTANT

## 2018-05-02 PROCEDURE — 73560 X-RAY EXAM OF KNEE 1 OR 2: CPT | Mod: LT,,, | Performed by: PHYSICIAN ASSISTANT

## 2018-05-02 RX ORDER — TRAZODONE HYDROCHLORIDE 50 MG/1
100 TABLET ORAL NIGHTLY
Qty: 60 TABLET | Refills: 1 | Status: SHIPPED | OUTPATIENT
Start: 2018-05-02 | End: 2023-03-21

## 2018-05-02 RX ORDER — ACETAMINOPHEN 325 MG/1
325 TABLET ORAL EVERY 6 HOURS PRN
Status: ON HOLD | COMMUNITY
End: 2018-10-05 | Stop reason: SDUPTHER

## 2018-05-02 RX ORDER — ASPIRIN 325 MG
325 TABLET ORAL DAILY
COMMUNITY
End: 2018-09-24

## 2018-05-02 RX ORDER — OXYCODONE AND ACETAMINOPHEN 10; 325 MG/1; MG/1
1 TABLET ORAL EVERY 4 HOURS PRN
Qty: 42 TABLET | Refills: 0 | Status: SHIPPED | OUTPATIENT
Start: 2018-05-02 | End: 2018-05-09

## 2018-05-02 NOTE — PROGRESS NOTES
Subjective:    Patient ID: Carola Blanco is a 52 y.o. female.    Chief Complaint: Post-op Evaluation of the Left Knee (Left TKA 4-19-18. She still has minor pain and swelling with stiffness)      History of Present Illness  The patient is here for two-week postoperative appointment status post left total knee arthroplasty. Overall she's improving daily from her pain and functional mobility standpoint.    Current Medications  Current Outpatient Prescriptions   Medication Sig Dispense Refill    acetaminophen (TYLENOL) 325 MG tablet Take 325 mg by mouth every 6 (six) hours as needed for Pain.      aspirin 325 MG tablet Take 325 mg by mouth once daily.      ibuprofen (ADVIL,MOTRIN) 100 MG tablet Take 100 mg by mouth every 6 (six) hours as needed for Temperature greater than.      lisinopril 10 MG tablet Take 1 tablet (10 mg total) by mouth every evening. 30 tablet 1    oxyCODONE-acetaminophen (PERCOCET)  mg per tablet Take 1 tablet by mouth every 4 (four) hours as needed for Pain. 42 tablet 0    apixaban 2.5 mg Tab Take 1 tablet (2.5 mg total) by mouth 2 (two) times daily. 42 tablet 0    diclofenac sodium (VOLTAREN) 1 % Gel Apply 4 g topically 4 (four) times daily. 100 g 0    metronidazole 1% (METROGEL) 1 % Gel Apply topically once daily. 1 Tube 0    naproxen (NAPROSYN) 500 MG tablet Take 500 mg by mouth 3 (three) times daily as needed.      traZODone (DESYREL) 50 MG tablet Take 2 tablets (100 mg total) by mouth every evening. 1-2 tablets by mouth daily at bedtime when necessary insomnia 60 tablet 1     No current facility-administered medications for this visit.        Allergies  Review of patient's allergies indicates:  No Known Allergies    Past Medical History  Past Medical History:   Diagnosis Date    Arthritis     Hypertension        Surgical History  Past Surgical History:   Procedure Laterality Date    KNEE SURGERY Left 04/19/2018       Family History:   Family History   Problem Relation Age of  Onset    Cancer Mother      breast    Arthritis Mother     Birth defects Mother     Cancer Father      lung    Arthritis Maternal Grandmother        Social History:   Social History     Social History    Marital status:      Spouse name: N/A    Number of children: N/A    Years of education: N/A     Occupational History    Not on file.     Social History Main Topics    Smoking status: Never Smoker    Smokeless tobacco: Never Used    Alcohol use No    Drug use: No    Sexual activity: Yes     Birth control/ protection: Injection     Other Topics Concern    Not on file     Social History Narrative    No narrative on file       Date of surgery:04/19/2018    Review of Systems     General/Constitutional: Chills denies. Fatigue denies. Fever denies. Weight gain denies. Weight loss denies.    Musculoskeletal: Comments: See HPI for details    Skin: Rash denies.    Objective:   Vital Signs:   Vitals:    05/02/18 1435   BP: (!) 149/90   Pulse: 80        Physical Exam    This a well-developed, well nourished patient in no acute distress.  They are alert and oriented and cooperative to examination.  Pt. walks without an antalgic gait.      General Examination:     Constitutional: The patient is alert and oriented to lace person and time. Mood is pleasant.     Head/Face: Normal facial features normal eyebrows    Eyes: Normal extraocular motion bilaterally    Lungs: Respirations are equal and unlabored    Gait is coordinated.    Cardiovascular: There are no swelling or varicosities present.    Lymphatic: Negative for adenopathy    Skin: Normal    Neurological: Level of consciousness normal. Oriented to place person and time and situation    Psychiatric: Oriented to time place person and situation    Left knee exam: Normal postoperative swelling. Incision is healing well with no signs or symptoms of infection. Staples removed today. Actively we can attain full extension actively she is lacking about 5 or 10°.  Active flexion is approximately 70° passively approximately 80°. Strength is limited secondary to guarding and pain.    XRAY Report/ Interpretation:AP and lateral x-rays of the Left knee x-rays demonstrate normal postoperative appearance      Assessment:       1. Insomnia due to medical condition    2. S/P total knee arthroplasty, left    3. Status post total left knee replacement    4. Status post knee replacement, unspecified laterality        Plan:       Carola was seen today for post-op evaluation.    Diagnoses and all orders for this visit:    Insomnia due to medical condition  -     traZODone (DESYREL) 50 MG tablet; Take 2 tablets (100 mg total) by mouth every evening. 1-2 tablets by mouth daily at bedtime when necessary insomnia    S/P total knee arthroplasty, left    Status post total left knee replacement  -     X-Ray Knee 1 or 2 View Left    Status post knee replacement, unspecified laterality  -     oxyCODONE-acetaminophen (PERCOCET)  mg per tablet; Take 1 tablet by mouth every 4 (four) hours as needed for Pain.         Follow-up in about 6 weeks (around 6/13/2018).    Continue with home health physical therapy and discuss transition to outpatient physical therapy at her follow-up appointment if she struggles with range of motion and functional mobility      This note was created using Dragon voice recognition software that occasionally misinterpreted phrases or words.

## 2018-05-08 ENCOUNTER — TELEPHONE (OUTPATIENT)
Dept: ORTHOPEDICS | Facility: CLINIC | Age: 53
End: 2018-05-08

## 2018-05-08 NOTE — TELEPHONE ENCOUNTER
Orders faxed to number provided.     ----- Message from Kait Vega sent at 5/8/2018  1:23 PM CDT -----  Contact: patient  Please call patient about PT, [facility] # [phone]

## 2018-05-09 ENCOUNTER — TELEPHONE (OUTPATIENT)
Dept: ORTHOPEDICS | Facility: CLINIC | Age: 53
End: 2018-05-09

## 2018-05-15 NOTE — TELEPHONE ENCOUNTER
----- Message from Thao Canales sent at 5/15/2018 11:36 AM CDT -----  Patient called needs a rx for pain meds [phone]

## 2018-05-16 RX ORDER — OXYCODONE AND ACETAMINOPHEN 10; 325 MG/1; MG/1
1 TABLET ORAL EVERY 4 HOURS PRN
Qty: 42 TABLET | Refills: 0 | Status: SHIPPED | OUTPATIENT
Start: 2018-05-16 | End: 2018-06-20

## 2018-05-29 NOTE — TELEPHONE ENCOUNTER
----- Message from Mery Marcus sent at 5/29/2018  9:02 AM CDT -----  Needs refill 10mg Oxyocdone.  [phone]

## 2018-05-30 RX ORDER — HYDROCODONE BITARTRATE AND ACETAMINOPHEN 10; 325 MG/1; MG/1
1 TABLET ORAL EVERY 6 HOURS PRN
Qty: 28 TABLET | Refills: 0 | Status: SHIPPED | OUTPATIENT
Start: 2018-05-30 | End: 2018-06-07

## 2018-06-07 NOTE — TELEPHONE ENCOUNTER
----- Message from Thao Canales sent at 6/7/2018  8:12 AM CDT -----  Patient called needs a rx for her pain meds oxycodone [phone]

## 2018-06-08 RX ORDER — HYDROCODONE BITARTRATE AND ACETAMINOPHEN 5; 325 MG/1; MG/1
1 TABLET ORAL EVERY 6 HOURS PRN
Qty: 28 TABLET | Refills: 0 | Status: SHIPPED | OUTPATIENT
Start: 2018-06-08 | End: 2018-06-18 | Stop reason: SDUPTHER

## 2018-06-08 RX ORDER — HYDROCODONE BITARTRATE AND ACETAMINOPHEN 5; 325 MG/1; MG/1
1 TABLET ORAL EVERY 6 HOURS PRN
Qty: 28 TABLET | Refills: 0 | Status: SHIPPED | OUTPATIENT
Start: 2018-06-08 | End: 2018-06-08

## 2018-06-18 RX ORDER — HYDROCODONE BITARTRATE AND ACETAMINOPHEN 5; 325 MG/1; MG/1
1 TABLET ORAL EVERY 6 HOURS PRN
Qty: 28 TABLET | Refills: 0 | Status: SHIPPED | OUTPATIENT
Start: 2018-06-18 | End: 2018-06-27 | Stop reason: SDUPTHER

## 2018-06-18 NOTE — TELEPHONE ENCOUNTER
----- Message from Mery Marcus sent at 6/18/2018  8:50 AM CDT -----  Needs refill on Norco 5mg.  Please call patient when ready @ [phone]

## 2018-06-20 ENCOUNTER — OFFICE VISIT (OUTPATIENT)
Dept: ORTHOPEDICS | Facility: CLINIC | Age: 53
End: 2018-06-20
Payer: COMMERCIAL

## 2018-06-20 VITALS
DIASTOLIC BLOOD PRESSURE: 80 MMHG | WEIGHT: 251 LBS | SYSTOLIC BLOOD PRESSURE: 130 MMHG | BODY MASS INDEX: 41.82 KG/M2 | HEIGHT: 65 IN

## 2018-06-20 DIAGNOSIS — Z96.652 S/P TOTAL KNEE ARTHROPLASTY, LEFT: Primary | ICD-10-CM

## 2018-06-20 PROCEDURE — 99024 POSTOP FOLLOW-UP VISIT: CPT | Mod: ,,, | Performed by: ORTHOPAEDIC SURGERY

## 2018-06-20 NOTE — PROGRESS NOTES
Subjective:       Patient ID: Carola Blanco is a 52 y.o. female.    Chief Complaint: Post-op Evaluation of the Left Knee (L TKA 4-19-18. Still has limited ROM. She is in P.T and she was told she was at 100. Goal is 120.)      History of Present Illness  Patient is here about 2 months status post left total knee arthroplasty. Overall she has little to no pain but a struggle with her goal of knee flexion at 120°. She can fully extend. She can ambulate but does find herself dragging her left lower extremity at times. She continues to take pain medication intermittently as needed and continues to attend outpatient physical therapy    Current Medications  Current Outpatient Prescriptions   Medication Sig Dispense Refill    acetaminophen (TYLENOL) 325 MG tablet Take 325 mg by mouth every 6 (six) hours as needed for Pain.      aspirin 325 MG tablet Take 325 mg by mouth once daily.      HYDROcodone-acetaminophen (NORCO) 5-325 mg per tablet Take 1 tablet by mouth every 6 (six) hours as needed for Pain. 28 tablet 0    ibuprofen (ADVIL,MOTRIN) 100 MG tablet Take 100 mg by mouth every 6 (six) hours as needed for Temperature greater than.      metronidazole 1% (METROGEL) 1 % Gel Apply topically once daily. 1 Tube 0    diclofenac sodium (VOLTAREN) 1 % Gel Apply 4 g topically 4 (four) times daily. 100 g 0    lisinopril 10 MG tablet Take 1 tablet (10 mg total) by mouth every evening. 30 tablet 1    traZODone (DESYREL) 50 MG tablet Take 2 tablets (100 mg total) by mouth every evening. 1-2 tablets by mouth daily at bedtime when necessary insomnia 60 tablet 1     No current facility-administered medications for this visit.        Allergies  Review of patient's allergies indicates:  No Known Allergies    Past Medical History  Past Medical History:   Diagnosis Date    Arthritis     Hypertension        Surgical History  Past Surgical History:   Procedure Laterality Date    KNEE SURGERY Left 04/19/2018       Family History:    Family History   Problem Relation Age of Onset    Cancer Mother         breast    Arthritis Mother     Birth defects Mother     Cancer Father         lung    Arthritis Maternal Grandmother        Social History:   Social History     Social History    Marital status:      Spouse name: N/A    Number of children: N/A    Years of education: N/A     Occupational History    Not on file.     Social History Main Topics    Smoking status: Never Smoker    Smokeless tobacco: Never Used    Alcohol use No    Drug use: No    Sexual activity: Yes     Birth control/ protection: Injection     Other Topics Concern    Not on file     Social History Narrative    No narrative on file       Hospitalization/Major Diagnostic Procedure:     Review of Systems     General/Constitutional:  Chills denies. Fatigue denies. Fever denies. Weight gain denies. Weight loss denies.    Respiratory:  Shortness of breath denies.    Cardiovascular:  Chest pain denies.    Gastrointestinal:  Constipation denies. Diarrhea denies. Nausea denies. Vomiting denies.     Hematology:  Easy bruising denies. Prolonged bleeding denies.     Genitourinary:  Frequent urination denies. Pain in lower back denies. Painful urination denies.     Musculoskeletal:  See HPI for details    Skin:  Rash denies.    Neurologic:  Dizziness denies. Gait abnormalities denies. Seizures denies. Tingling/Numbess denies.    Psychiatric:  Anxiety denies. Depressed mood denies.     Objective:   Vital Signs:   Vitals:    06/20/18 1449   BP: 130/80        Physical Exam      General Examination:     Constitutional: The patient is alert and oriented to lace person and time. Mood is pleasant.     Head/Face: Normal facial features normal eyebrows    Eyes: Normal extraocular motion bilaterally    Lungs: Respirations are equal and unlabored    Gait is coordinated.    Cardiovascular: There are no swelling or varicosities present.    Lymphatic: Negative for adenopathy    Skin:  Normal    Neurological: Level of consciousness normal. Oriented to place person and time and situation    Psychiatric: Oriented to time place person and situation    Left knee exam: Mild antalgic gait. Left knee incision remains well-healed. Active range of motion extension within normal limitations or full extension. Active range of motion flexion approximately 100°. Strength is normal    XRAY Report/ Interpretation: No new studies      Assessment:       1. S/P total knee arthroplasty, left        Plan:       Carola was seen today for post-op evaluation.    Diagnoses and all orders for this visit:    S/P total knee arthroplasty, left         Follow-up in about 2 months (around 8/20/2018).    Continue to increase functional mobility and activity as tolerated. Consider proceeding with right total knee arthroplasty in 2 months. Continue physical therapy as long she continues to make progress. Continue with pain medications as needed but start to wean.    This note was created using Dragon voice recognition software that occasionally misinterpreted phrases or words.

## 2018-06-27 RX ORDER — HYDROCODONE BITARTRATE AND ACETAMINOPHEN 5; 325 MG/1; MG/1
1 TABLET ORAL EVERY 6 HOURS PRN
Qty: 28 TABLET | Refills: 0 | Status: SHIPPED | OUTPATIENT
Start: 2018-06-27 | End: 2018-07-05 | Stop reason: SDUPTHER

## 2018-06-27 NOTE — TELEPHONE ENCOUNTER
----- Message from Thao Canales sent at 6/27/2018  9:39 AM CDT -----  Patient called needs a rx for her meds hydrocodene [phone]

## 2018-07-05 NOTE — TELEPHONE ENCOUNTER
----- Message from Mery Marcus sent at 7/5/2018 10:08 AM CDT -----  Patient needs refill on Norco 5mg.  Please call when ready [phone].  Thank you

## 2018-07-06 RX ORDER — HYDROCODONE BITARTRATE AND ACETAMINOPHEN 5; 325 MG/1; MG/1
1 TABLET ORAL EVERY 8 HOURS PRN
Qty: 28 TABLET | Refills: 0 | Status: SHIPPED | OUTPATIENT
Start: 2018-07-06 | End: 2018-07-13

## 2018-07-09 ENCOUNTER — TELEPHONE (OUTPATIENT)
Dept: ORTHOPEDICS | Facility: CLINIC | Age: 53
End: 2018-07-09

## 2018-07-09 NOTE — TELEPHONE ENCOUNTER
----- Message from Mery Marcus sent at 7/9/2018  9:41 AM CDT -----  Patient hasn't picked up pain meds yet.  The last time she was here Dr. Bang wrote out an RX for Trazadine (sp?), but she would like Ambien generic.  Please call patient [phone]

## 2018-07-17 NOTE — TELEPHONE ENCOUNTER
rx refilled. Patient can  tomorrow once signed. Informed the patient this is her last rx as 7/19, is her 90 day post op cassie.     ----- Message from Theresa Brennan sent at 7/17/2018  9:26 AM CDT -----  Pt is requesting a refill Hydrocodone.

## 2018-07-18 RX ORDER — HYDROCODONE BITARTRATE AND ACETAMINOPHEN 5; 325 MG/1; MG/1
1 TABLET ORAL EVERY 8 HOURS PRN
Qty: 28 TABLET | Refills: 0 | Status: SHIPPED | OUTPATIENT
Start: 2018-07-18 | End: 2018-07-25

## 2018-08-03 DIAGNOSIS — I10 ESSENTIAL HYPERTENSION: ICD-10-CM

## 2018-08-05 RX ORDER — LISINOPRIL 10 MG/1
TABLET ORAL
Qty: 30 TABLET | Refills: 1 | OUTPATIENT
Start: 2018-08-05

## 2018-08-06 DIAGNOSIS — I10 ESSENTIAL HYPERTENSION: ICD-10-CM

## 2018-08-06 RX ORDER — LISINOPRIL 10 MG/1
10 TABLET ORAL NIGHTLY
Qty: 30 TABLET | Refills: 0 | Status: SHIPPED | OUTPATIENT
Start: 2018-08-06 | End: 2023-03-21

## 2018-08-20 ENCOUNTER — OFFICE VISIT (OUTPATIENT)
Dept: ORTHOPEDICS | Facility: CLINIC | Age: 53
End: 2018-08-20
Payer: COMMERCIAL

## 2018-08-20 VITALS
SYSTOLIC BLOOD PRESSURE: 130 MMHG | DIASTOLIC BLOOD PRESSURE: 80 MMHG | HEIGHT: 65 IN | WEIGHT: 251 LBS | BODY MASS INDEX: 41.82 KG/M2

## 2018-08-20 DIAGNOSIS — Z96.652 STATUS POST TOTAL LEFT KNEE REPLACEMENT: ICD-10-CM

## 2018-08-20 DIAGNOSIS — M17.11 PRIMARY OSTEOARTHRITIS OF RIGHT KNEE: Primary | ICD-10-CM

## 2018-08-20 PROCEDURE — 3079F DIAST BP 80-89 MM HG: CPT | Mod: ,,, | Performed by: ORTHOPAEDIC SURGERY

## 2018-08-20 PROCEDURE — 99213 OFFICE O/P EST LOW 20 MIN: CPT | Mod: ,,, | Performed by: ORTHOPAEDIC SURGERY

## 2018-08-20 PROCEDURE — 3075F SYST BP GE 130 - 139MM HG: CPT | Mod: ,,, | Performed by: ORTHOPAEDIC SURGERY

## 2018-08-20 PROCEDURE — 3008F BODY MASS INDEX DOCD: CPT | Mod: ,,, | Performed by: ORTHOPAEDIC SURGERY

## 2018-08-20 RX ORDER — HYDROCODONE BITARTRATE AND ACETAMINOPHEN 10; 325 MG/1; MG/1
TABLET ORAL
Refills: 0 | COMMUNITY
Start: 2018-07-09 | End: 2018-08-20

## 2018-08-20 RX ORDER — ZOLPIDEM TARTRATE 10 MG/1
5 TABLET ORAL NIGHTLY PRN
COMMUNITY
Start: 2018-08-06 | End: 2018-10-17

## 2018-08-20 NOTE — PROGRESS NOTES
Subjective:    Patient ID: Carola Blanco is a 52 y.o. female.    Chief Complaint: Post-op Evaluation of the Left Knee (p Lt knee TKA 04/19.Left knee has some stiffness. She is in P.T)      History of Present Illness  Done well from left total knee replacement and she is quite pleased. She is now ready to have the right total knee replacement performed she has right knee pain primarily on the medial aspect worse with prolonged standing and walking    Current Medications  Current Outpatient Medications   Medication Sig Dispense Refill    acetaminophen (TYLENOL) 325 MG tablet Take 325 mg by mouth every 6 (six) hours as needed for Pain.      aspirin 325 MG tablet Take 325 mg by mouth once daily.      lisinopril 10 MG tablet Take 1 tablet (10 mg total) by mouth every evening. 30 tablet 0    metronidazole 1% (METROGEL) 1 % Gel Apply topically once daily. 1 Tube 0    naproxen (NAPROSYN) 500 MG tablet TAKE ONE TABLET BY MOUTH TWICE DAILY 60 tablet 0    zolpidem (AMBIEN) 10 mg Tab       diclofenac sodium (VOLTAREN) 1 % Gel Apply 4 g topically 4 (four) times daily. 100 g 0    traZODone (DESYREL) 50 MG tablet Take 2 tablets (100 mg total) by mouth every evening. 1-2 tablets by mouth daily at bedtime when necessary insomnia 60 tablet 1     No current facility-administered medications for this visit.        Allergies  Review of patient's allergies indicates:  No Known Allergies    Past Medical History  Past Medical History:   Diagnosis Date    Arthritis     Hypertension        Surgical History  Past Surgical History:   Procedure Laterality Date    KNEE SURGERY Left 04/19/2018       Family History:   Family History   Problem Relation Age of Onset    Cancer Mother         breast    Arthritis Mother     Birth defects Mother     Cancer Father         lung    Arthritis Maternal Grandmother        Social History:   Social History     Socioeconomic History    Marital status:      Spouse name: Not on file     Number of children: Not on file    Years of education: Not on file    Highest education level: Not on file   Social Needs    Financial resource strain: Not on file    Food insecurity - worry: Not on file    Food insecurity - inability: Not on file    Transportation needs - medical: Not on file    Transportation needs - non-medical: Not on file   Occupational History    Not on file   Tobacco Use    Smoking status: Never Smoker    Smokeless tobacco: Never Used   Substance and Sexual Activity    Alcohol use: No    Drug use: No    Sexual activity: Yes     Birth control/protection: Injection   Other Topics Concern    Not on file   Social History Narrative    Not on file       Date of surgery:    Review of Systems     General/Constitutional: Chills denies. Fatigue denies. Fever denies. Weight gain denies. Weight loss denies.    Musculoskeletal: Comments: See HPI for details    Skin: Rash denies.    Objective:   Vital Signs:   Vitals:    08/20/18 1503   BP: 130/80        Physical Exam    This a well-developed, well nourished patient in no acute distress.  They are alert and oriented and cooperative to examination.  Pt. walks without an antalgic gait.      General Examination:     Constitutional: The patient is alert and oriented to lace person and time. Mood is pleasant.     Head/Face: Normal facial features normal eyebrows    Eyes: Normal extraocular motion bilaterally    Lungs: Respirations are equal and unlabored    Gait is coordinated.    Cardiovascular: There are no swelling or varicosities present.    Lymphatic: Negative for adenopathy    Skin: Normal    Neurological: Level of consciousness normal. Oriented to place person and time and situation    Psychiatric: Oriented to time place person and situation    Right knee exam shows tibia vera tenderness of medial joint line crepitance and pain with valgus stress testing range of motion 0-70° left knee shows well-healed incision range of motion 0-100°  without pain  Prior x-rays showed medial compartment osteoarthritis of the right knee with bone-on-bone contact  XRAY Report/ Interpretation:       Assessment:       1. Status post total left knee replacement    2. Primary osteoarthritis of right knee        Plan:       Carola was seen today for post-op evaluation.    Diagnoses and all orders for this visit:    Status post total left knee replacement    Primary osteoarthritis of right knee         Follow-up in about 3 months (around 11/20/2018), or with knee xrays left.   Treatment options were discussed regards to the nature of the knee condition conservative pain interventional and surgical options were discussed in detail and the probability of success of the separate treatment options was discussed in detail the patient expressed a clear understanding of the treatment options would regards to surgery the procedure risks benefits complications and outcomes were discussed.  No guarantees were given regards to surgical outcome.  The technical aspects of the surgery were discussed in detail with the patient today. The patient was able to verbalize an understanding. The procedure risk benefits alternatives and possible complications of the surgical procedure was discussed including expected outcomes. No guarantees were given regards to outcomes. Consent forms were will be signed at a later date. All questions regarding the surgery itself were answered. The patient wishes to proceed with surgery and will be scheduled. The patient may require preoperative medical clearance.  Right total knee arthroplasty to be scheduled      This note was created using Dragon voice recognition software that occasionally misinterpreted phrases or words.

## 2018-08-21 DIAGNOSIS — M17.11 PRIMARY OSTEOARTHRITIS OF RIGHT KNEE: Primary | ICD-10-CM

## 2018-09-24 ENCOUNTER — HOSPITAL ENCOUNTER (OUTPATIENT)
Dept: PREADMISSION TESTING | Facility: HOSPITAL | Age: 53
Discharge: HOME | End: 2018-09-24
Attending: ORTHOPAEDIC SURGERY
Payer: COMMERCIAL

## 2018-09-24 ENCOUNTER — HOSPITAL ENCOUNTER (OUTPATIENT)
Dept: RADIOLOGY | Facility: HOSPITAL | Age: 53
Discharge: HOME | End: 2018-09-24
Attending: ORTHOPAEDIC SURGERY
Payer: COMMERCIAL

## 2018-09-24 VITALS — WEIGHT: 252 LBS | BODY MASS INDEX: 41.99 KG/M2 | HEIGHT: 65 IN

## 2018-09-24 DIAGNOSIS — M17.11 PRIMARY OSTEOARTHRITIS OF RIGHT KNEE: Primary | ICD-10-CM

## 2018-09-24 LAB
ABO + RH BLD: NORMAL
ANION GAP SERPL CALC-SCNC: 12 MMOL/L
BACTERIA #/AREA URNS HPF: ABNORMAL /HPF
BASOPHILS # BLD AUTO: 0 K/UL
BASOPHILS NFR BLD: 0.5 %
BILIRUB UR QL STRIP: NEGATIVE
BLD GP AB SCN CELLS X3 SERPL QL: NORMAL
BUN SERPL-MCNC: 15 MG/DL
CALCIUM SERPL-MCNC: 9.9 MG/DL
CHLORIDE SERPL-SCNC: 103 MMOL/L
CLARITY UR: ABNORMAL
CO2 SERPL-SCNC: 25 MMOL/L
COLOR UR: YELLOW
CREAT SERPL-MCNC: 0.9 MG/DL
DIFFERENTIAL METHOD: ABNORMAL
EOSINOPHIL # BLD AUTO: 0.2 K/UL
EOSINOPHIL NFR BLD: 1.8 %
ERYTHROCYTE [DISTWIDTH] IN BLOOD BY AUTOMATED COUNT: 13.8 %
EST. GFR  (AFRICAN AMERICAN): >60 ML/MIN/1.73 M^2
EST. GFR  (NON AFRICAN AMERICAN): >60 ML/MIN/1.73 M^2
GLUCOSE SERPL-MCNC: 90 MG/DL
GLUCOSE UR QL STRIP: NEGATIVE
HCT VFR BLD AUTO: 41.3 %
HGB BLD-MCNC: 13.5 G/DL
HGB UR QL STRIP: ABNORMAL
KETONES UR QL STRIP: NEGATIVE
LEUKOCYTE ESTERASE UR QL STRIP: ABNORMAL
LYMPHOCYTES # BLD AUTO: 3.3 K/UL
LYMPHOCYTES NFR BLD: 32.5 %
MCH RBC QN AUTO: 26.5 PG
MCHC RBC AUTO-ENTMCNC: 32.6 G/DL
MCV RBC AUTO: 82 FL
MICROSCOPIC COMMENT: ABNORMAL
MONOCYTES # BLD AUTO: 0.7 K/UL
MONOCYTES NFR BLD: 7.1 %
NEUTROPHILS # BLD AUTO: 5.9 K/UL
NEUTROPHILS NFR BLD: 58.1 %
NITRITE UR QL STRIP: POSITIVE
PH UR STRIP: 6 [PH] (ref 5–8)
PLATELET # BLD AUTO: 391 K/UL
PMV BLD AUTO: 8 FL
POTASSIUM SERPL-SCNC: 4.1 MMOL/L
PROT UR QL STRIP: NEGATIVE
RBC # BLD AUTO: 5.07 M/UL
RBC #/AREA URNS HPF: 1 /HPF (ref 0–4)
SODIUM SERPL-SCNC: 140 MMOL/L
SP GR UR STRIP: >=1.03 (ref 1–1.03)
SQUAMOUS #/AREA URNS HPF: 2 /HPF
URN SPEC COLLECT METH UR: ABNORMAL
UROBILINOGEN UR STRIP-ACNC: NEGATIVE EU/DL
WBC # BLD AUTO: 10.1 K/UL
WBC #/AREA URNS HPF: 10 /HPF (ref 0–5)

## 2018-09-24 PROCEDURE — 71046 X-RAY EXAM CHEST 2 VIEWS: CPT | Mod: 26,,, | Performed by: RADIOLOGY

## 2018-09-24 PROCEDURE — 93010 ELECTROCARDIOGRAM REPORT: CPT | Mod: ,,, | Performed by: INTERNAL MEDICINE

## 2018-09-24 PROCEDURE — 87086 URINE CULTURE/COLONY COUNT: CPT

## 2018-09-24 PROCEDURE — 87077 CULTURE AEROBIC IDENTIFY: CPT

## 2018-09-24 PROCEDURE — 85025 COMPLETE CBC W/AUTO DIFF WBC: CPT

## 2018-09-24 PROCEDURE — 86901 BLOOD TYPING SEROLOGIC RH(D): CPT

## 2018-09-24 PROCEDURE — 87186 SC STD MICRODIL/AGAR DIL: CPT

## 2018-09-24 PROCEDURE — 93005 ELECTROCARDIOGRAM TRACING: CPT

## 2018-09-24 PROCEDURE — 99900103 DSU ONLY-NO CHARGE-INITIAL HR (STAT)

## 2018-09-24 PROCEDURE — 81000 URINALYSIS NONAUTO W/SCOPE: CPT

## 2018-09-24 PROCEDURE — 73562 X-RAY EXAM OF KNEE 3: CPT | Mod: TC,FY,RT

## 2018-09-24 PROCEDURE — 73562 X-RAY EXAM OF KNEE 3: CPT | Mod: 26,RT,, | Performed by: RADIOLOGY

## 2018-09-24 PROCEDURE — 80048 BASIC METABOLIC PNL TOTAL CA: CPT

## 2018-09-24 PROCEDURE — 87088 URINE BACTERIA CULTURE: CPT

## 2018-09-24 PROCEDURE — 36415 COLL VENOUS BLD VENIPUNCTURE: CPT

## 2018-09-24 PROCEDURE — 71046 X-RAY EXAM CHEST 2 VIEWS: CPT | Mod: TC,FY

## 2018-09-24 PROCEDURE — 99900104 DSU ONLY-NO CHARGE-EA ADD'L HR (STAT)

## 2018-09-24 NOTE — PRE ADMISSION SCREENING
"               [facility] Risk Assessment Scale    Patient Name: Carola Blanco  YOB: 1965  MRN: [id number]            RIsk Factor Measure Recommendation Patient Data Scale/Score   BMI >40 Reconsider surgery, weight loss   Estimated body mass index is 41.93 kg/m² as calculated from the following:    Height as of this encounter: 5' 5" (1.651 m).    Weight as of this encounter: 114.3 kg (252 lb).   [] 0 = 1 - 24.9  [] 1 = 25-29.9  [] 2 = 30-34.9  [] 3 = 35-39.9  [x] 4 = 40-44.9  [] 5 = 45-99.9   Hemoglobin AIC (if applicable) >9 Delay surgery until DM under control  Refer for:  · Nutrition Therapy  · Exercise   · Medication    No results found for: LABA1C, HGBA1C    Lab Results   Component Value Date    GLU 90 09/24/2018      [] 0 = 4.0-5.6  [] 1 = 5.7-6.4  [] 2 = 6.5-6.9  [] 3 = 7.0-7.9  [] 4 = 8.0-8.9  [] 5 = 9.0-12   Hemoglobin (Anemia) <9 Delay surgery   Correct anemia Lab Results   Component Value Date    HGB 13.5 09/24/2018    [] 20 - <9.0                    Albumin <3 Delay surgery &Workup Lab Results   Component Value Date    ALBUMIN 3.9 04/05/2018    [] 20 - <3.0   Smoking Cessation >4 Weeks Delay Surgery  Refer to OP Cessation Class    Never Smoker [] 20 - current smoker                                _____ PPD                    Hx of MI, PE, Arrhythmia, CVA, DVT <30 Days Delay Surgery    N/A [] 20      Infection Variable Delay surgery and re-evaluate   N/A [] 20 - recent/current infection     Depression (PHQ) >10 out of 27 Delay Surgery and re-evaluate  Medication  Counseling              [x] 0     []1     []2     []3      []4      [] 5                    (1-4)      (5-9)  (10-14)  (15-19)   (20-27)     Memory Impairment & Memory loss (Mini-Cog Screening Tool) Advanced dementia and/or Parkinson's Reconsider surgery     [x] 0     []1     []2     []3     []4     [] 5     Physical Conditioning (Modified AM-PAC Per Physical Therapy at [facility]) Unable to ambulate on day of surgery Delay surgery and " re-evaluate  Pre-Rehabilitation   (PT evaluation)       [x]  0   []4       []8     []12        []16     []20       (<20%)   (<40%)   (<60%)   (<80% )    (>80%)     Home Environment/Caregiver support  (Per /Navigator Interview)    Availability of basic services and/or approprate assistance during post-operative period Delay surgery and re-evaluate  Safe home environment  Health   1 week post-surgery  Transportation  availability  Ability to obtain DME/Medications post-op    [x] 0     []1     []2     []3     []4     [] 5  [x] 0     []1     []2     []3     []4     [] 5  [x] 0     []1     []2     []3     []4     [] 5  [x] 0     []1     []2     []3     []4     [] 5         MD Contact: Dr. Bang Comments:  Total Score:  4

## 2018-09-24 NOTE — PRE ADMISSION SCREENING
Patient Name: Carola Blanco  YOB: 1965   MRN: [id number]     [facility]   Basic Mobility Inpatient Short Form 6 Clicks         How much difficulty does the patient currently have  Unable  A Lot  A Little  None      1. Turning over in bed (including adjusting bedclothes, sheets and blankets)?     1 []    2 []    3 [x]    4 []        2. Sitting down on and standing up from a chair with arms (e.g., wheelchair, bedside commode, etc.)     1 []  2 []  3 []     4 [x]      3. Moving from lying on back to sitting on the side of the bed?     1 []  2 []  3 []    4 [x]    How much help from another person does the patient currently need  Total  A Lot  A Little  None      4. Moving to and from a bed to a chair (including a wheelchair)?    1 []  2 []  3 []    4 [x]      5. Need to walk in hospital room?    1 []  2 []  3 []    4 [x]      6. Climbing 3-5 steps with a railing?    1 []  2 []  3 []    4 [x]       Raw Score:       23           CMS 0-100% Score:   11.20         %   Standardized Score:   56.93            CMS Modifier:       CI                                   [facility]   Basic Mobility Inpatient Short Form 6 Clicks Score Conversion Table*         *Use this form to convert -PAC Basic Mobility Inpatient Raw Scores.   [facility] Inpatient Basic Mobility Short Form Scoring Example   1. Add the number values associated with the response to each item. For example, items totals yield a Raw Score of 21.   2. Match the raw score to the t-Scale scores (t-Scale score = 50.25, SE = 4.69).   3. Find the associated CMS % (CMS % = 28.97%).   4. Locate the correct CMS Functional Modifier Code, or G Code (G code = CJ)     NOTE: Each -PAC Short Form has a separate conversion table. Make sure that you use the correct conversion table.       Instruction Manual - page 45 contains conversion table

## 2018-09-24 NOTE — PRE ADMISSION SCREENING
JOINT CAMP ASSESSMENT    Name Carola Blanco   MRN [id number]    Age/Sex 52 y.o. female    Surgeon Dr. Markie Bang   Joint Camp Date 9/24/2018   Surgery Date 10/4/2018   Procedure Right Knee Arthroplasty   Ins[facility] / Plan: [facility] H[facility] / Product Type: HMO /    Care Team Patient Care Team:  Prashanth Cabrera NP as PCP - General (Family Medicine)  Markie Bang MD as Consulting Physician (Orthopedic Surgery)    Pharmacy   [facility] [address] [place] [address] [id number]  [address] [facility]  [facility] [address]  Phone: [phone] Fax: [phone]     AM-PAC Score   23   Risk Assessment Score 4     Past Medical History:   Diagnosis Date    Arthritis     Hypertension        Past Surgical History:   Procedure Laterality Date    ARTHROPLASTY-KNEE Left 4/19/2018    Performed by Markie Bang MD at [facility] OR    JOINT REPLACEMENT      KNEE SURGERY Left 04/19/2018         Home Enviroment     Living Arrangement: Lives with spouse  Home Environment: 2-story house, number of outside stairs: 1, number of inside stairs: 14, bedroom on 2nd floor, bathroom on 2nd floor, walk-in shower  Home Safety Concerns: Pets in the home: cats (1).    DISCHARGE CAREGIVER/SUPPORT SYSTEM     Identified post-op caregiver: Patient has spouse / significant other.  Patient's caregiver(s) will be able to provide physical assistance. Patient will have someone to assist overnight.      Caregiver present at pre-op interview:  No      PRE-OPERATIVE FUNCTIONAL STATUS     Employment: Employed full time    Pre-op Functional Status: Patient is independent with mobility/ambulation, transfers, ADL's, IADL's.    Use of assistive device for ambulation: none  ADL: self care  ADL Limitations: difficulty with walking  Medical Restrictions: Decreased range of motions in extremities    POTENTIAL BARRIERS TO DISCHARGE/POTENTIAL POST-OP COMPLICATIONS     No pertinent medical hx that would delay discharge. Patient does have 14  stairs to get to bedroom and bathroom. Stair training needed from post op Day 1. Patient had left knee done at [facility] on 4/19/2018 with no complications.      DISCHARGE PLAN     Expected LOS of 2 days or less for joint replacement discussed with patient. We also discussed a discharge path of HH for approximately two weeks with a transition to outpatient PT on the third week given no post-op complications.      Patient in agreement with discharge plan: Yes    Discharge to: Discharge home with home heidi (PT/OT) x2 weeks with transition to outpatient PT     HH: [facility]      OP PT: [facility]     Home DME: rolling walker, bedside commode and shower chair     Needed DME at D/C: none     Rx: Per Dr. Bang at discharge.     Meds to Beds: N/A  Patient expected to discharge on Aspirin 325 mg by mouth twice daily for DVT prophylaxis.

## 2018-09-25 ENCOUNTER — HOSPITAL ENCOUNTER (OUTPATIENT)
Dept: PREADMISSION TESTING | Facility: HOSPITAL | Age: 53
Discharge: HOME | End: 2018-09-25

## 2018-09-25 DIAGNOSIS — M17.12 PRIMARY OSTEOARTHRITIS OF LEFT KNEE: ICD-10-CM

## 2018-09-25 NOTE — PRE-PROCEDURE INSTRUCTIONS
Message received from Shakira at DR. Bang's office.  Patient was put on Bactrim and needs a cath u/a morning of surgery.  Shital Terrazas

## 2018-09-25 NOTE — PRE-PROCEDURE INSTRUCTIONS
Results for LEYLA NAVARRETE (MRN [id number]) as of 9/25/2018 08:33   Ref. Range 9/24/2018 15:15   Specimen UA Unknown Urine, Unspecified   Color, UA Latest Ref Range: Yellow, Straw, Valeria  Yellow   pH, UA Latest Ref Range: 5.0 - 8.0  6.0   Specific Gravity, UA Latest Ref Range: 1.005 - 1.030  >=1.030 (A)   Appearance, UA Latest Ref Range: Clear  Hazy (A)   Protein, UA Latest Ref Range: Negative  Negative   Glucose, UA Latest Ref Range: Negative  Negative   Ketones, UA Latest Ref Range: Negative  Negative   Occult Blood UA Latest Ref Range: Negative  Trace (A)   Nitrite, UA Latest Ref Range: Negative  Positive (A)   Urobilinogen, UA Latest Ref Range: <2.0 EU/dL Negative   Bilirubin (UA) Latest Ref Range: Negative  Negative   Leukocytes, UA Latest Ref Range: Negative  1+ (A)   RBC, UA Latest Ref Range: 0 - 4 /hpf 1   WBC, UA Latest Ref Range: 0 - 5 /hpf 10 (H)   Bacteria, UA Latest Ref Range: None-Occ /hpf Many (A)   Squam Epithel, UA Latest Units: /hpf 2   Microscopic Comment Unknown SEE COMMENT   Informed Shakira with Dr. Bang, to let him know of results.  She stated she would order antibiotics for the patient.   Shital Terrazas

## 2018-09-27 LAB — BACTERIA UR CULT: NORMAL

## 2018-10-01 ENCOUNTER — TELEPHONE (OUTPATIENT)
Dept: ORTHOPEDICS | Facility: CLINIC | Age: 53
End: 2018-10-01

## 2018-10-01 RX ORDER — SULFAMETHOXAZOLE AND TRIMETHOPRIM 400; 80 MG/1; MG/1
1 TABLET ORAL
Status: ON HOLD | COMMUNITY
Start: 2018-09-25 | End: 2018-10-09 | Stop reason: HOSPADM

## 2018-10-01 NOTE — TELEPHONE ENCOUNTER
Patient has abnormal urine, scheduled for TKA Dr. Bang on Thursday. Patient taking Bactrim DS and preop will repeat u/a morning of surgery. We have received her Urine reflex, and it looks like e-coli contaminate.

## 2018-10-03 ENCOUNTER — ANESTHESIA EVENT (OUTPATIENT)
Dept: SURGERY | Facility: HOSPITAL | Age: 53
DRG: 470 | End: 2018-10-03
Payer: COMMERCIAL

## 2018-10-04 ENCOUNTER — HOSPITAL ENCOUNTER (INPATIENT)
Facility: HOSPITAL | Age: 53
LOS: 2 days | Discharge: HOME HEALTH | DRG: 470 | End: 2018-10-06
Attending: ORTHOPAEDIC SURGERY | Admitting: ORTHOPAEDIC SURGERY
Payer: COMMERCIAL

## 2018-10-04 ENCOUNTER — ANESTHESIA (OUTPATIENT)
Dept: SURGERY | Facility: HOSPITAL | Age: 53
DRG: 470 | End: 2018-10-04
Payer: COMMERCIAL

## 2018-10-04 DIAGNOSIS — Z96.651 S/P TOTAL KNEE ARTHROPLASTY, RIGHT: ICD-10-CM

## 2018-10-04 DIAGNOSIS — M17.11 PRIMARY OSTEOARTHRITIS OF RIGHT KNEE: ICD-10-CM

## 2018-10-04 DIAGNOSIS — M17.12 PRIMARY OSTEOARTHRITIS OF LEFT KNEE: Primary | ICD-10-CM

## 2018-10-04 DIAGNOSIS — I10 HYPERTENSION, UNSPECIFIED TYPE: ICD-10-CM

## 2018-10-04 LAB
ANION GAP SERPL CALC-SCNC: 4 MMOL/L
APTT BLDCRRT: 25.4 SEC
B-HCG UR QL: NEGATIVE
BASOPHILS # BLD AUTO: 0 K/UL
BASOPHILS NFR BLD: 0.4 %
BILIRUB UR QL STRIP: NEGATIVE
BUN SERPL-MCNC: 12 MG/DL
CALCIUM SERPL-MCNC: 8.6 MG/DL
CHLORIDE SERPL-SCNC: 105 MMOL/L
CLARITY UR: CLEAR
CO2 SERPL-SCNC: 28 MMOL/L
COLOR UR: YELLOW
CREAT SERPL-MCNC: 1 MG/DL
CTP QC/QA: YES
DIFFERENTIAL METHOD: ABNORMAL
EOSINOPHIL # BLD AUTO: 0.1 K/UL
EOSINOPHIL NFR BLD: 1.6 %
ERYTHROCYTE [DISTWIDTH] IN BLOOD BY AUTOMATED COUNT: 13.7 %
EST. GFR  (AFRICAN AMERICAN): >60 ML/MIN/1.73 M^2
EST. GFR  (NON AFRICAN AMERICAN): >60 ML/MIN/1.73 M^2
GLUCOSE SERPL-MCNC: 95 MG/DL
GLUCOSE UR QL STRIP: NEGATIVE
HCT VFR BLD AUTO: 36.4 %
HGB BLD-MCNC: 11.8 G/DL
HGB UR QL STRIP: ABNORMAL
INR PPP: 1
KETONES UR QL STRIP: NEGATIVE
LEUKOCYTE ESTERASE UR QL STRIP: NEGATIVE
LYMPHOCYTES # BLD AUTO: 2.8 K/UL
LYMPHOCYTES NFR BLD: 31.1 %
MCH RBC QN AUTO: 26.8 PG
MCHC RBC AUTO-ENTMCNC: 32.6 G/DL
MCV RBC AUTO: 82 FL
MONOCYTES # BLD AUTO: 0.6 K/UL
MONOCYTES NFR BLD: 6.8 %
NEUTROPHILS # BLD AUTO: 5.5 K/UL
NEUTROPHILS NFR BLD: 60.1 %
NITRITE UR QL STRIP: NEGATIVE
PH UR STRIP: 6 [PH] (ref 5–8)
PLATELET # BLD AUTO: 333 K/UL
PMV BLD AUTO: 7.6 FL
POTASSIUM SERPL-SCNC: 4.5 MMOL/L
PROT UR QL STRIP: NEGATIVE
PROTHROMBIN TIME: 10.4 SEC
RBC # BLD AUTO: 4.42 M/UL
SODIUM SERPL-SCNC: 137 MMOL/L
SP GR UR STRIP: 1.02 (ref 1–1.03)
URN SPEC COLLECT METH UR: ABNORMAL
UROBILINOGEN UR STRIP-ACNC: NEGATIVE EU/DL
WBC # BLD AUTO: 9.1 K/UL

## 2018-10-04 PROCEDURE — G8979 MOBILITY GOAL STATUS: HCPCS | Mod: CI | Performed by: PHYSICAL THERAPIST

## 2018-10-04 PROCEDURE — 25000003 PHARM REV CODE 250: Performed by: PHYSICIAN ASSISTANT

## 2018-10-04 PROCEDURE — D9220A PRA ANESTHESIA: Mod: ANES,,, | Performed by: ANESTHESIOLOGY

## 2018-10-04 PROCEDURE — C1769 GUIDE WIRE: HCPCS | Performed by: ORTHOPAEDIC SURGERY

## 2018-10-04 PROCEDURE — 85025 COMPLETE CBC W/AUTO DIFF WBC: CPT

## 2018-10-04 PROCEDURE — 37000008 HC ANESTHESIA 1ST 15 MINUTES: Performed by: ORTHOPAEDIC SURGERY

## 2018-10-04 PROCEDURE — 99900035 HC TECH TIME PER 15 MIN (STAT)

## 2018-10-04 PROCEDURE — D9220A PRA ANESTHESIA: Mod: CRNA,,, | Performed by: NURSE ANESTHETIST, CERTIFIED REGISTERED

## 2018-10-04 PROCEDURE — 63600175 PHARM REV CODE 636 W HCPCS: Performed by: ORTHOPAEDIC SURGERY

## 2018-10-04 PROCEDURE — 97161 PT EVAL LOW COMPLEX 20 MIN: CPT | Performed by: PHYSICAL THERAPIST

## 2018-10-04 PROCEDURE — 36415 COLL VENOUS BLD VENIPUNCTURE: CPT

## 2018-10-04 PROCEDURE — 63600175 PHARM REV CODE 636 W HCPCS: Performed by: ANESTHESIOLOGY

## 2018-10-04 PROCEDURE — 25000003 PHARM REV CODE 250: Performed by: ANESTHESIOLOGY

## 2018-10-04 PROCEDURE — 97530 THERAPEUTIC ACTIVITIES: CPT | Performed by: PHYSICAL THERAPIST

## 2018-10-04 PROCEDURE — S0020 INJECTION, BUPIVICAINE HYDRO: HCPCS | Performed by: ANESTHESIOLOGY

## 2018-10-04 PROCEDURE — 85610 PROTHROMBIN TIME: CPT

## 2018-10-04 PROCEDURE — 81025 URINE PREGNANCY TEST: CPT | Performed by: ORTHOPAEDIC SURGERY

## 2018-10-04 PROCEDURE — 0SRC0J9 REPLACEMENT OF RIGHT KNEE JOINT WITH SYNTHETIC SUBSTITUTE, CEMENTED, OPEN APPROACH: ICD-10-PCS | Performed by: ORTHOPAEDIC SURGERY

## 2018-10-04 PROCEDURE — 37000009 HC ANESTHESIA EA ADD 15 MINS: Performed by: ORTHOPAEDIC SURGERY

## 2018-10-04 PROCEDURE — 76942 ECHO GUIDE FOR BIOPSY: CPT | Mod: 26,,, | Performed by: ANESTHESIOLOGY

## 2018-10-04 PROCEDURE — C1776 JOINT DEVICE (IMPLANTABLE): HCPCS | Performed by: ORTHOPAEDIC SURGERY

## 2018-10-04 PROCEDURE — 71000033 HC RECOVERY, INTIAL HOUR: Performed by: ORTHOPAEDIC SURGERY

## 2018-10-04 PROCEDURE — 99222 1ST HOSP IP/OBS MODERATE 55: CPT | Mod: ,,, | Performed by: INTERNAL MEDICINE

## 2018-10-04 PROCEDURE — 80048 BASIC METABOLIC PNL TOTAL CA: CPT

## 2018-10-04 PROCEDURE — 36000710: Performed by: ORTHOPAEDIC SURGERY

## 2018-10-04 PROCEDURE — 63600175 PHARM REV CODE 636 W HCPCS: Performed by: NURSE ANESTHETIST, CERTIFIED REGISTERED

## 2018-10-04 PROCEDURE — 97110 THERAPEUTIC EXERCISES: CPT | Performed by: PHYSICAL THERAPIST

## 2018-10-04 PROCEDURE — 97116 GAIT TRAINING THERAPY: CPT | Performed by: PHYSICAL THERAPIST

## 2018-10-04 PROCEDURE — 36000711: Performed by: ORTHOPAEDIC SURGERY

## 2018-10-04 PROCEDURE — 85730 THROMBOPLASTIN TIME PARTIAL: CPT

## 2018-10-04 PROCEDURE — 27201423 OPTIME MED/SURG SUP & DEVICES STERILE SUPPLY: Performed by: ORTHOPAEDIC SURGERY

## 2018-10-04 PROCEDURE — 99900104 DSU ONLY-NO CHARGE-EA ADD'L HR (STAT): Performed by: ORTHOPAEDIC SURGERY

## 2018-10-04 PROCEDURE — 11000001 HC ACUTE MED/SURG PRIVATE ROOM

## 2018-10-04 PROCEDURE — 99900103 DSU ONLY-NO CHARGE-INITIAL HR (STAT): Performed by: ORTHOPAEDIC SURGERY

## 2018-10-04 PROCEDURE — C1713 ANCHOR/SCREW BN/BN,TIS/BN: HCPCS | Performed by: ORTHOPAEDIC SURGERY

## 2018-10-04 PROCEDURE — 94799 UNLISTED PULMONARY SVC/PX: CPT

## 2018-10-04 PROCEDURE — 25000003 PHARM REV CODE 250: Performed by: ORTHOPAEDIC SURGERY

## 2018-10-04 PROCEDURE — 25000003 PHARM REV CODE 250: Performed by: INTERNAL MEDICINE

## 2018-10-04 PROCEDURE — 64447 NJX AA&/STRD FEMORAL NRV IMG: CPT | Mod: 59,RT,, | Performed by: ANESTHESIOLOGY

## 2018-10-04 PROCEDURE — G8978 MOBILITY CURRENT STATUS: HCPCS | Mod: CK | Performed by: PHYSICAL THERAPIST

## 2018-10-04 PROCEDURE — C1729 CATH, DRAINAGE: HCPCS | Performed by: ORTHOPAEDIC SURGERY

## 2018-10-04 PROCEDURE — 81003 URINALYSIS AUTO W/O SCOPE: CPT

## 2018-10-04 PROCEDURE — 94761 N-INVAS EAR/PLS OXIMETRY MLT: CPT

## 2018-10-04 PROCEDURE — 25000003 PHARM REV CODE 250: Performed by: NURSE ANESTHETIST, CERTIFIED REGISTERED

## 2018-10-04 PROCEDURE — 63600175 PHARM REV CODE 636 W HCPCS: Performed by: PHYSICIAN ASSISTANT

## 2018-10-04 PROCEDURE — 63600175 PHARM REV CODE 636 W HCPCS: Performed by: NURSE PRACTITIONER

## 2018-10-04 PROCEDURE — 71000039 HC RECOVERY, EACH ADD'L HOUR: Performed by: ORTHOPAEDIC SURGERY

## 2018-10-04 DEVICE — PATELLA OVAL 38MM: Type: IMPLANTABLE DEVICE | Site: KNEE | Status: FUNCTIONAL

## 2018-10-04 DEVICE — CEMENT BONE IMPLANT: Type: IMPLANTABLE DEVICE | Site: KNEE | Status: FUNCTIONAL

## 2018-10-04 DEVICE — TIBIAL STEM SZ 3.: Type: IMPLANTABLE DEVICE | Site: KNEE | Status: FUNCTIONAL

## 2018-10-04 DEVICE — IMPLANTABLE DEVICE: Type: IMPLANTABLE DEVICE | Site: KNEE | Status: FUNCTIONAL

## 2018-10-04 RX ORDER — SODIUM CHLORIDE 0.9 % (FLUSH) 0.9 %
3 SYRINGE (ML) INJECTION
Status: DISCONTINUED | OUTPATIENT
Start: 2018-10-04 | End: 2018-10-04 | Stop reason: HOSPADM

## 2018-10-04 RX ORDER — SODIUM CHLORIDE, SODIUM LACTATE, POTASSIUM CHLORIDE, CALCIUM CHLORIDE 600; 310; 30; 20 MG/100ML; MG/100ML; MG/100ML; MG/100ML
INJECTION, SOLUTION INTRAVENOUS CONTINUOUS
Status: DISCONTINUED | OUTPATIENT
Start: 2018-10-04 | End: 2018-10-04

## 2018-10-04 RX ORDER — METRONIDAZOLE 7.5 MG/G
GEL TOPICAL DAILY
Status: DISCONTINUED | OUTPATIENT
Start: 2018-10-04 | End: 2018-10-06 | Stop reason: HOSPADM

## 2018-10-04 RX ORDER — SODIUM CHLORIDE 9 MG/ML
INJECTION, SOLUTION INTRAVENOUS CONTINUOUS
Status: DISCONTINUED | OUTPATIENT
Start: 2018-10-04 | End: 2018-10-04

## 2018-10-04 RX ORDER — MUPIROCIN 20 MG/G
1 OINTMENT TOPICAL 2 TIMES DAILY
Status: DISCONTINUED | OUTPATIENT
Start: 2018-10-04 | End: 2018-10-06 | Stop reason: HOSPADM

## 2018-10-04 RX ORDER — PREGABALIN 75 MG/1
75 CAPSULE ORAL 2 TIMES DAILY
Status: DISCONTINUED | OUTPATIENT
Start: 2018-10-04 | End: 2018-10-06 | Stop reason: HOSPADM

## 2018-10-04 RX ORDER — LOPERAMIDE HYDROCHLORIDE 2 MG/1
2 CAPSULE ORAL CONTINUOUS PRN
Status: DISCONTINUED | OUTPATIENT
Start: 2018-10-04 | End: 2018-10-06 | Stop reason: HOSPADM

## 2018-10-04 RX ORDER — ONDANSETRON 2 MG/ML
4 INJECTION INTRAMUSCULAR; INTRAVENOUS EVERY 12 HOURS PRN
Status: DISCONTINUED | OUTPATIENT
Start: 2018-10-04 | End: 2018-10-06 | Stop reason: HOSPADM

## 2018-10-04 RX ORDER — TRAZODONE HYDROCHLORIDE 50 MG/1
100 TABLET ORAL NIGHTLY
Status: DISCONTINUED | OUTPATIENT
Start: 2018-10-04 | End: 2018-10-06 | Stop reason: HOSPADM

## 2018-10-04 RX ORDER — POLYETHYLENE GLYCOL 3350 17 G/17G
17 POWDER, FOR SOLUTION ORAL DAILY
Status: DISCONTINUED | OUTPATIENT
Start: 2018-10-04 | End: 2018-10-06 | Stop reason: HOSPADM

## 2018-10-04 RX ORDER — SODIUM CHLORIDE 0.9 % (FLUSH) 0.9 %
5 SYRINGE (ML) INJECTION
Status: DISCONTINUED | OUTPATIENT
Start: 2018-10-04 | End: 2018-10-06 | Stop reason: HOSPADM

## 2018-10-04 RX ORDER — HYDROMORPHONE HYDROCHLORIDE 2 MG/ML
0.2 INJECTION, SOLUTION INTRAMUSCULAR; INTRAVENOUS; SUBCUTANEOUS EVERY 5 MIN PRN
Status: DISCONTINUED | OUTPATIENT
Start: 2018-10-04 | End: 2018-10-04 | Stop reason: HOSPADM

## 2018-10-04 RX ORDER — CEFAZOLIN SODIUM 2 G/50ML
2 SOLUTION INTRAVENOUS
Status: COMPLETED | OUTPATIENT
Start: 2018-10-04 | End: 2018-10-04

## 2018-10-04 RX ORDER — SODIUM CHLORIDE, SODIUM LACTATE, POTASSIUM CHLORIDE, CALCIUM CHLORIDE 600; 310; 30; 20 MG/100ML; MG/100ML; MG/100ML; MG/100ML
75 INJECTION, SOLUTION INTRAVENOUS CONTINUOUS
Status: DISCONTINUED | OUTPATIENT
Start: 2018-10-04 | End: 2018-10-04

## 2018-10-04 RX ORDER — DIPHENHYDRAMINE HYDROCHLORIDE 50 MG/ML
25 INJECTION INTRAMUSCULAR; INTRAVENOUS EVERY 6 HOURS PRN
Status: DISCONTINUED | OUTPATIENT
Start: 2018-10-04 | End: 2018-10-04 | Stop reason: HOSPADM

## 2018-10-04 RX ORDER — OXYCODONE HCL 10 MG/1
10 TABLET, FILM COATED, EXTENDED RELEASE ORAL ONCE
Status: COMPLETED | OUTPATIENT
Start: 2018-10-04 | End: 2018-10-04

## 2018-10-04 RX ORDER — BUPIVACAINE HYDROCHLORIDE 5 MG/ML
INJECTION, SOLUTION EPIDURAL; INTRACAUDAL
Status: COMPLETED | OUTPATIENT
Start: 2018-10-04 | End: 2018-10-04

## 2018-10-04 RX ORDER — FENTANYL CITRATE 50 UG/ML
INJECTION, SOLUTION INTRAMUSCULAR; INTRAVENOUS
Status: DISCONTINUED | OUTPATIENT
Start: 2018-10-04 | End: 2018-10-04

## 2018-10-04 RX ORDER — ACETAMINOPHEN 10 MG/ML
1000 INJECTION, SOLUTION INTRAVENOUS EVERY 8 HOURS
Status: DISCONTINUED | OUTPATIENT
Start: 2018-10-04 | End: 2018-10-04

## 2018-10-04 RX ORDER — OXYCODONE HYDROCHLORIDE 5 MG/1
5 TABLET ORAL
Status: DISCONTINUED | OUTPATIENT
Start: 2018-10-04 | End: 2018-10-04 | Stop reason: HOSPADM

## 2018-10-04 RX ORDER — ACETAMINOPHEN 10 MG/ML
1000 INJECTION, SOLUTION INTRAVENOUS EVERY 8 HOURS
Status: COMPLETED | OUTPATIENT
Start: 2018-10-04 | End: 2018-10-05

## 2018-10-04 RX ORDER — CELECOXIB 100 MG/1
200 CAPSULE ORAL ONCE
Status: COMPLETED | OUTPATIENT
Start: 2018-10-04 | End: 2018-10-04

## 2018-10-04 RX ORDER — MIDAZOLAM HYDROCHLORIDE 1 MG/ML
INJECTION INTRAMUSCULAR; INTRAVENOUS
Status: DISCONTINUED | OUTPATIENT
Start: 2018-10-04 | End: 2018-10-04

## 2018-10-04 RX ORDER — TRANEXAMIC ACID 100 MG/ML
10 INJECTION, SOLUTION INTRAVENOUS
Status: DISCONTINUED | OUTPATIENT
Start: 2018-10-04 | End: 2018-10-04 | Stop reason: SDUPTHER

## 2018-10-04 RX ORDER — ONDANSETRON 2 MG/ML
4 INJECTION INTRAMUSCULAR; INTRAVENOUS ONCE
Status: DISCONTINUED | OUTPATIENT
Start: 2018-10-04 | End: 2018-10-04 | Stop reason: HOSPADM

## 2018-10-04 RX ORDER — ZOLPIDEM TARTRATE 5 MG/1
5 TABLET ORAL NIGHTLY PRN
Status: DISCONTINUED | OUTPATIENT
Start: 2018-10-04 | End: 2018-10-06 | Stop reason: HOSPADM

## 2018-10-04 RX ORDER — OXYCODONE HYDROCHLORIDE 10 MG/1
10 TABLET ORAL EVERY 4 HOURS PRN
Status: DISCONTINUED | OUTPATIENT
Start: 2018-10-04 | End: 2018-10-06 | Stop reason: HOSPADM

## 2018-10-04 RX ORDER — FAMOTIDINE 20 MG/1
20 TABLET, FILM COATED ORAL 2 TIMES DAILY
Status: DISCONTINUED | OUTPATIENT
Start: 2018-10-04 | End: 2018-10-06 | Stop reason: HOSPADM

## 2018-10-04 RX ORDER — ONDANSETRON 4 MG/1
8 TABLET, ORALLY DISINTEGRATING ORAL EVERY 8 HOURS PRN
Status: DISCONTINUED | OUTPATIENT
Start: 2018-10-04 | End: 2018-10-06 | Stop reason: HOSPADM

## 2018-10-04 RX ORDER — ENOXAPARIN SODIUM 100 MG/ML
40 INJECTION SUBCUTANEOUS EVERY 24 HOURS
Status: DISCONTINUED | OUTPATIENT
Start: 2018-10-04 | End: 2018-10-06 | Stop reason: HOSPADM

## 2018-10-04 RX ORDER — BUPIVACAINE HYDROCHLORIDE 7.5 MG/ML
INJECTION, SOLUTION EPIDURAL; RETROBULBAR
Status: COMPLETED | OUTPATIENT
Start: 2018-10-04 | End: 2018-10-04

## 2018-10-04 RX ORDER — LOPERAMIDE HYDROCHLORIDE 2 MG/1
4 CAPSULE ORAL ONCE
Status: COMPLETED | OUTPATIENT
Start: 2018-10-04 | End: 2018-10-04

## 2018-10-04 RX ORDER — POLYETHYLENE GLYCOL 3350 17 G/17G
17 POWDER, FOR SOLUTION ORAL DAILY
Status: DISCONTINUED | OUTPATIENT
Start: 2018-10-04 | End: 2018-10-04 | Stop reason: SDUPTHER

## 2018-10-04 RX ORDER — CEFAZOLIN SODIUM 2 G/50ML
2 SOLUTION INTRAVENOUS
Status: COMPLETED | OUTPATIENT
Start: 2018-10-04 | End: 2018-10-05

## 2018-10-04 RX ORDER — ACETAMINOPHEN 10 MG/ML
1000 INJECTION, SOLUTION INTRAVENOUS ONCE
Status: COMPLETED | OUTPATIENT
Start: 2018-10-04 | End: 2018-10-04

## 2018-10-04 RX ORDER — PREGABALIN 75 MG/1
75 CAPSULE ORAL ONCE
Status: COMPLETED | OUTPATIENT
Start: 2018-10-04 | End: 2018-10-04

## 2018-10-04 RX ORDER — TRANEXAMIC ACID 100 MG/ML
INJECTION, SOLUTION INTRAVENOUS
Status: DISCONTINUED | OUTPATIENT
Start: 2018-10-04 | End: 2018-10-04

## 2018-10-04 RX ORDER — OXYCODONE HYDROCHLORIDE 5 MG/1
5 TABLET ORAL EVERY 4 HOURS PRN
Status: DISCONTINUED | OUTPATIENT
Start: 2018-10-04 | End: 2018-10-06 | Stop reason: HOSPADM

## 2018-10-04 RX ORDER — SODIUM CHLORIDE, SODIUM LACTATE, POTASSIUM CHLORIDE, CALCIUM CHLORIDE 600; 310; 30; 20 MG/100ML; MG/100ML; MG/100ML; MG/100ML
INJECTION, SOLUTION INTRAVENOUS CONTINUOUS
Status: DISCONTINUED | OUTPATIENT
Start: 2018-10-04 | End: 2018-10-06 | Stop reason: HOSPADM

## 2018-10-04 RX ORDER — ONDANSETRON HYDROCHLORIDE 2 MG/ML
INJECTION, SOLUTION INTRAMUSCULAR; INTRAVENOUS
Status: DISCONTINUED | OUTPATIENT
Start: 2018-10-04 | End: 2018-10-04

## 2018-10-04 RX ORDER — PROPOFOL 10 MG/ML
VIAL (ML) INTRAVENOUS CONTINUOUS PRN
Status: DISCONTINUED | OUTPATIENT
Start: 2018-10-04 | End: 2018-10-04

## 2018-10-04 RX ORDER — LISINOPRIL 10 MG/1
10 TABLET ORAL NIGHTLY
Status: DISCONTINUED | OUTPATIENT
Start: 2018-10-04 | End: 2018-10-06 | Stop reason: HOSPADM

## 2018-10-04 RX ORDER — DOCUSATE SODIUM 100 MG/1
100 CAPSULE, LIQUID FILLED ORAL EVERY 12 HOURS
Status: DISCONTINUED | OUTPATIENT
Start: 2018-10-04 | End: 2018-10-06 | Stop reason: HOSPADM

## 2018-10-04 RX ORDER — MEPERIDINE HYDROCHLORIDE 50 MG/ML
12.5 INJECTION INTRAMUSCULAR; INTRAVENOUS; SUBCUTANEOUS ONCE AS NEEDED
Status: COMPLETED | OUTPATIENT
Start: 2018-10-04 | End: 2018-10-04

## 2018-10-04 RX ORDER — SULFAMETHOXAZOLE AND TRIMETHOPRIM 400; 80 MG/1; MG/1
1 TABLET ORAL
Status: DISCONTINUED | OUTPATIENT
Start: 2018-10-04 | End: 2018-10-06 | Stop reason: HOSPADM

## 2018-10-04 RX ORDER — BACITRACIN 50000 [IU]/1
INJECTION, POWDER, FOR SOLUTION INTRAMUSCULAR
Status: DISCONTINUED | OUTPATIENT
Start: 2018-10-04 | End: 2018-10-04 | Stop reason: HOSPADM

## 2018-10-04 RX ORDER — ACETAMINOPHEN 10 MG/ML
INJECTION, SOLUTION INTRAVENOUS
Status: DISPENSED
Start: 2018-10-04 | End: 2018-10-05

## 2018-10-04 RX ORDER — FENTANYL CITRATE 50 UG/ML
25 INJECTION, SOLUTION INTRAMUSCULAR; INTRAVENOUS EVERY 5 MIN PRN
Status: DISCONTINUED | OUTPATIENT
Start: 2018-10-04 | End: 2018-10-04 | Stop reason: HOSPADM

## 2018-10-04 RX ORDER — METRONIDAZOLE 10 MG/G
GEL TOPICAL DAILY
Status: DISCONTINUED | OUTPATIENT
Start: 2018-10-04 | End: 2018-10-04 | Stop reason: CLARIF

## 2018-10-04 RX ORDER — LIDOCAINE HYDROCHLORIDE 10 MG/ML
1 INJECTION, SOLUTION EPIDURAL; INFILTRATION; INTRACAUDAL; PERINEURAL ONCE
Status: DISCONTINUED | OUTPATIENT
Start: 2018-10-04 | End: 2018-10-04 | Stop reason: HOSPADM

## 2018-10-04 RX ADMIN — LISINOPRIL 10 MG: 10 TABLET ORAL at 09:10

## 2018-10-04 RX ADMIN — MUPIROCIN 1 G: 20 OINTMENT TOPICAL at 11:10

## 2018-10-04 RX ADMIN — SODIUM CHLORIDE 1000 ML: 0.9 INJECTION, SOLUTION INTRAVENOUS at 06:10

## 2018-10-04 RX ADMIN — OXYCODONE HYDROCHLORIDE 10 MG: 10 TABLET ORAL at 09:10

## 2018-10-04 RX ADMIN — PREGABALIN 75 MG: 75 CAPSULE ORAL at 06:10

## 2018-10-04 RX ADMIN — METRONIDAZOLE: 7.5 GEL TOPICAL at 11:10

## 2018-10-04 RX ADMIN — BUPIVACAINE HYDROCHLORIDE 10 ML: 5 INJECTION, SOLUTION EPIDURAL; INTRACAUDAL; PERINEURAL at 07:10

## 2018-10-04 RX ADMIN — CEFAZOLIN SODIUM 2 G: 2 SOLUTION INTRAVENOUS at 07:10

## 2018-10-04 RX ADMIN — PROPOFOL 50 MCG/KG/MIN: 10 INJECTION, EMULSION INTRAVENOUS at 07:10

## 2018-10-04 RX ADMIN — MUPIROCIN 1 G: 20 OINTMENT TOPICAL at 09:10

## 2018-10-04 RX ADMIN — SULFAMETHOXAZOLE AND TRIMETHOPRIM 1 TABLET: 400; 80 TABLET ORAL at 09:10

## 2018-10-04 RX ADMIN — ENOXAPARIN SODIUM 40 MG: 100 INJECTION SUBCUTANEOUS at 04:10

## 2018-10-04 RX ADMIN — TRAZODONE HYDROCHLORIDE 100 MG: 50 TABLET ORAL at 09:10

## 2018-10-04 RX ADMIN — FENTANYL CITRATE 50 MCG: 50 INJECTION, SOLUTION INTRAMUSCULAR; INTRAVENOUS at 06:10

## 2018-10-04 RX ADMIN — BUPIVACAINE HYDROCHLORIDE 2 ML: 7.5 INJECTION, SOLUTION EPIDURAL; RETROBULBAR at 07:10

## 2018-10-04 RX ADMIN — CELECOXIB 200 MG: 100 CAPSULE ORAL at 06:10

## 2018-10-04 RX ADMIN — DOCUSATE SODIUM 100 MG: 100 CAPSULE, LIQUID FILLED ORAL at 11:10

## 2018-10-04 RX ADMIN — OXYCODONE HYDROCHLORIDE 10 MG: 10 TABLET ORAL at 05:10

## 2018-10-04 RX ADMIN — TRANEXAMIC ACID 1134 MG: 100 INJECTION, SOLUTION INTRAVENOUS at 09:10

## 2018-10-04 RX ADMIN — OXYCODONE HYDROCHLORIDE 10 MG: 10 TABLET ORAL at 12:10

## 2018-10-04 RX ADMIN — FAMOTIDINE 20 MG: 20 TABLET ORAL at 09:10

## 2018-10-04 RX ADMIN — MIDAZOLAM HYDROCHLORIDE 2 MG: 1 INJECTION, SOLUTION INTRAMUSCULAR; INTRAVENOUS at 06:10

## 2018-10-04 RX ADMIN — SODIUM CHLORIDE, SODIUM LACTATE, POTASSIUM CHLORIDE, AND CALCIUM CHLORIDE: .6; .31; .03; .02 INJECTION, SOLUTION INTRAVENOUS at 06:10

## 2018-10-04 RX ADMIN — ACETAMINOPHEN 1000 MG: 10 INJECTION, SOLUTION INTRAVENOUS at 06:10

## 2018-10-04 RX ADMIN — DOCUSATE SODIUM 100 MG: 100 CAPSULE, LIQUID FILLED ORAL at 09:10

## 2018-10-04 RX ADMIN — ZOLPIDEM TARTRATE 5 MG: 5 TABLET ORAL at 09:10

## 2018-10-04 RX ADMIN — OXYCODONE HYDROCHLORIDE 10 MG: 10 TABLET, FILM COATED, EXTENDED RELEASE ORAL at 06:10

## 2018-10-04 RX ADMIN — FAMOTIDINE 20 MG: 20 TABLET ORAL at 11:10

## 2018-10-04 RX ADMIN — SODIUM CHLORIDE: 0.9 INJECTION, SOLUTION INTRAVENOUS at 06:10

## 2018-10-04 RX ADMIN — PREGABALIN 75 MG: 75 CAPSULE ORAL at 09:10

## 2018-10-04 RX ADMIN — ONDANSETRON 4 MG: 2 INJECTION, SOLUTION INTRAMUSCULAR; INTRAVENOUS at 07:10

## 2018-10-04 RX ADMIN — TRANEXAMIC ACID 1134 MG: 100 INJECTION, SOLUTION INTRAVENOUS at 11:10

## 2018-10-04 RX ADMIN — ACETAMINOPHEN 1000 MG: 10 INJECTION, SOLUTION INTRAVENOUS at 12:10

## 2018-10-04 RX ADMIN — ACETAMINOPHEN 1000 MG: 10 INJECTION, SOLUTION INTRAVENOUS at 08:10

## 2018-10-04 RX ADMIN — MEPERIDINE HYDROCHLORIDE 12.5 MG: 50 INJECTION, SOLUTION INTRAMUSCULAR; INTRAVENOUS; SUBCUTANEOUS at 09:10

## 2018-10-04 RX ADMIN — CEFAZOLIN SODIUM 2 G: 2 SOLUTION INTRAVENOUS at 04:10

## 2018-10-04 RX ADMIN — LOPERAMIDE HYDROCHLORIDE 4 MG: 2 CAPSULE ORAL at 11:10

## 2018-10-04 RX ADMIN — SODIUM CHLORIDE, SODIUM LACTATE, POTASSIUM CHLORIDE, AND CALCIUM CHLORIDE: .6; .31; .03; .02 INJECTION, SOLUTION INTRAVENOUS at 09:10

## 2018-10-04 RX ADMIN — TRANEXAMIC ACID 1000 MG: 100 INJECTION, SOLUTION INTRAVENOUS at 07:10

## 2018-10-04 RX ADMIN — SULFAMETHOXAZOLE AND TRIMETHOPRIM 1 TABLET: 400; 80 TABLET ORAL at 11:10

## 2018-10-04 NOTE — BRIEF OP NOTE
[facility]  Brief Operative Note    SUMMARY     Surgery Date: 10/4/2018     Surgeon(s) and Role:     * Makrie Bang MD - Primary    Assisting Surgeon: mauricio Josue    Pre-op Diagnosis:  Primary osteoarthritis of right knee [M17.11]    Post-op Diagnosis:  Post-Op Diagnosis Codes:     * Primary osteoarthritis of right knee [M17.11]    Procedure(s) (LRB):  ARTHROPLASTY, KNEE (Right)    Anesthesia: General    Description of Procedure: dictated    Description of the findings of the procedure: dictated    Estimated Blood Loss: 50 mL         Specimens:   Specimen (12h ago, onward)    None

## 2018-10-04 NOTE — ANESTHESIA PROCEDURE NOTES
Spinal    Diagnosis: DDD RIGHT KNEE  Patient location during procedure: OR  Start time: 10/4/2018 7:00 AM  Timeout: 10/4/2018 7:00 AM  End time: 10/4/2018 7:05 AM  Staffing  Anesthesiologist: Fracisco William MD  Performed: anesthesiologist   Spinal Block  Patient position: sitting  Prep: ChloraPrep  Patient monitoring: heart rate, cardiac monitor and continuous pulse ox  Approach: midline  Location: L2-3  Injection technique: single shot  CSF Fluid: clear free-flowing CSF  Needle  Needle type: pencil-tip   Needle gauge: 25 G  Needle length: 3.5 in  Additional Documentation: incremental injection, negative aspiration for heme and no paresthesia on injection  Needle localization: anatomical landmarks  Assessment  Sensory level: T11   Dermatomal levels determined by alcohol wipe  Ease of block: easy  Patient's tolerance of the procedure: comfortable throughout block and no complaints  Additional Notes  EPI WASH

## 2018-10-04 NOTE — PT/OT/SLP EVAL
Physical Therapy Evaluation    Patient Name:  Carola Blanco   MRN:  [id number]    Recommendations:     Discharge Recommendations:  home health PT   Discharge Equipment Recommendations: walker, rolling(Pt states that she could not find her RW just prior to coming for surgery, and she will need another one. )   Barriers to discharge: None    Assessment:     Carola Blanco is a 52 y.o. female admitted with a medical diagnosis of S/P total knee arthroplasty, right.  She presents with the following impairments/functional limitations:  weakness, gait instability, decreased upper extremity function, decreased ROM, impaired endurance, impaired balance, decreased lower extremity function, impaired joint extensibility, impaired sensation, decreased safety awareness, pain, orthopedic precautions, impaired functional mobilty.  During PT evaluation today she demonstrated poor contraction of R quad.  She required Min A for transfer supine<>sit, and Mod A for sit<>stand with bed height elevated.  She was able to ambulate 20ft with RW and Min A with R KI donned and step to pattern.  Further gait distance was limited due to increased pain in R knee up to 9/10 despite being medicated prior to evaluation.  Pt will need a RW upon discharge, and is recommended for HHPT.      Rehab Prognosis:  good; patient would benefit from acute skilled PT services to address these deficits and reach maximum level of function.      Recent Surgery: Procedure(s) (LRB):  ARTHROPLASTY, KNEE (Right) Day of Surgery    Plan:     During this hospitalization, patient to be seen BID to address the above listed problems via gait training, therapeutic activities, therapeutic exercises  · Plan of Care Expires:  10/18/18   Plan of Care Reviewed with: patient, spouse    Subjective     Communicated with JASMEET Rossi prior to session.  Patient found supine in bed upon PT entry to room, agreeable to evaluation.   present    Chief Complaint: Pain  Patient comments/goals:  Pt wants to be able to walk better  Pain/Comfort:  · Pain Rating 1: 9/10  · Location - Side 1: Right  · Location 1: knee  · Pain Addressed 1: Pre-medicate for activity, Reposition, Distraction, Nurse notified  · Pain Rating Post-Intervention 1: 9/10    Patients cultural, spiritual, Mormon conflicts given the current situation: none    Living Environment:  Pt lives with her  in a 2 story home with 1 step to enter, and 14 steps up to her bed/bathroom.    Prior to admission, patients level of function was independent.  Patient has the following equipment: none.  DME owned (not currently used): bedside commode.  Upon discharge, patient will have assistance from .    Objective:     Patient found with: cryotherapy, mckeon catheter, knee immobilizer, peripheral IV     General Precautions: Standard, fall   Orthopedic Precautions:RLE weight bearing as tolerated   Braces: Knee immobilizer     Exams:  · Cognitive Exam:  Patient is oriented to Person, Place, Time and Situation  · Postural Exam:  Patient presented with the following abnormalities:    · -       Rounded shoulders  · -       Forward head  · RUE ROM: WFL  · RUE Strength: WFL  · LUE ROM: WFL  · LUE Strength: WFL  · RLE ROM: Deficits: Knee extension lacking 5* from full extension, and flexion to ~60* and is extremely limited by pain.   · RLE Strength: Deficits: hip flexion: 2/5, knee extension: 2/5, ankle DF/PF: 3/5  · LLE ROM: WFL  · LLE Strength: WFL    Functional Mobility:  · Bed Mobility:     · Supine to Sit: minimum assistance and increased time with HOB rasied, and assist to advance R LE off EOB.   · Sit to Supine: minimum assistance and increased time with marifer to lift R LE into the bed.   · Transfers:     · Sit to Stand:  moderate assistance with rolling walker and bed height raised with increased time to get B feet positioned inside the base of the walker once standing.    · Gait: 20ft with RW and Min A with R KI donned.  Pt requires cues  to perform full step to pattern and on when and how far to advance the RW.  Further distances is limited due to continued intense pain at 9/10.  RN notified.   · Balance: Dynamic standing balance: fair    AM-PAC 6 CLICK MOBILITY  Total Score:16       Therapeutic Activities and Exercises:   As above.  Supine exercises included:   AP's x 10 reps  quad set 10 reps x 5 sec  glut set 10 reps x 5 sec  SAQ x 10 reps with Min A  Heel slide x 10 reps with Min A    Patient left HOB elevated with all lines intact, call button in reach, RN Glena notified and  present.    GOALS:   Multidisciplinary Problems     Physical Therapy Goals        Problem: Physical Therapy Goal    Goal Priority Disciplines Outcome Goal Variances Interventions   Physical Therapy Goal     PT, PT/OT Ongoing (interventions implemented as appropriate)                     History:     Past Medical History:   Diagnosis Date    Arthritis     Hypertension        Past Surgical History:   Procedure Laterality Date    ARTHROPLASTY-KNEE Left 4/19/2018    [facility]    JOINT REPLACEMENT      KNEE SURGERY Left 04/19/2018       Clinical Decision Making:     History  Co-morbidities and personal factors that may impact the plan of care Examination  Body Structures and Functions, activity limitations and participation restrictions that may impact the plan of care Clinical Presentation   Decision Making/ Complexity Score   Co-morbidities:   [] Time since onset of injury / illness / exacerbation  [] Status of current condition  []Patient's cognitive status and safety concerns    [] Multiple Medical Problems (see med hx)  Personal Factors:   [] Patient's age  [] Prior Level of function   [] Patient's home situation (environment and family support)  [] Patient's level of motivation  [] Expected progression of patient      HISTORY:(criteria)    [] 62377 - no personal factors/history    [] 62817 - has 1-2 personal factor/comorbidity      [] 73671 - has >3 personal factor/comorbidity     Body Regions:  [] Objective examination findings  [] Head     []  Neck  [] Trunk   [] Upper Extremity  [] Lower Extremity    Body Systems:  [] For communication ability, affect, cognition, language, and learning style: the assessment of the ability to make needs known, consciousness, orientation (person, place, and time), expected emotional /behavioral responses, and learning preferences (eg, learning barriers, education  needs)  [] For the neuromuscular system: a general assessment of gross coordinated movement (eg, balance, gait, locomotion, transfers, and transitions) and motor function  (motor control and motor learning)  [] For the musculoskeletal system: the assessment of gross symmetry, gross range of motion, gross strength, height, and weight  [] For the integumentary system: the assessment of pliability(texture), presence of scar formation, skin color, and skin integrity  [] For cardiovascular/pulmonary system: the assessment of heart rate, respiratory rate, blood pressure, and edema     Activity limitations:    [] Patient's cognitive status and saf ety concerns          [] Status of current condition      [] Weight bearing restriction  [] Cardiopulmunary Restriction    Participation Restrictions:   [] Goals and goal agreement with the patient     [] Rehab potential (prognosis) and probable outcome      Examination of Body System: (criteria)    [] 34129 - addressing 1-2 elements    [] 16025 - addressing a total of 3 or more elements     [] 94997 -  Addressing a total of 4 or more elements         Clinical Presentation: (criteria)  Choose one     On examination of body system using standardized tests and measures patient presents with (CHOOSE ONE) elements from any of the following: body structures and functions, activity limitations, and/or participation restrictions.  Leading to a clinical presentation that is considered (CHOOSE  ONE)                              Clinical Decision Making  (Eval Complexity):  Choose One     Time Tracking:     PT Received On: 10/04/18  PT Start Time: 1308     PT Stop Time: 1349  PT Total Time (min): 41 min     Billable Minutes: Evaluation 9, Gait Training 16, Therapeutic Activity 8 and Therapeutic Exercise 8      Theresa Ohara, PT  10/04/2018

## 2018-10-04 NOTE — ANESTHESIA PROCEDURE NOTES
Peripheral    Patient location during procedure: pre-op   Block not for primary anesthetic.  Reason for block: at surgeon's request and post-op pain management   Post-op Pain Location: RIGHT KNEE   Start time: 10/4/2018 6:40 AM  Timeout: 10/4/2018 6:40 AM   End time: 10/4/2018 6:40 AM  Surgery related to: RIGHT TKA   Staffing  Anesthesiologist: Fracisco William MD  Performed: anesthesiologist   Preanesthetic Checklist  Completed: patient identified, site marked, surgical consent, pre-op evaluation, timeout performed, IV checked, risks and benefits discussed and monitors and equipment checked  Peripheral Block  Patient position: supine  Prep: ChloraPrep  Patient monitoring: heart rate, cardiac monitor, continuous pulse ox, continuous capnometry and frequent blood pressure checks  Block type: adductor canal  Laterality: right  Injection technique: single shot  Needle  Needle type: Stimuplex   Needle gauge: 21 G  Needle length: 4 in  Needle localization: anatomical landmarks and ultrasound guidance   -ultrasound image captured on disc.  Assessment  Injection assessment: negative aspiration, negative parasthesia and local visualized surrounding nerve  Paresthesia pain: none  Heart rate change: no  Slow fractionated injection: yes  Additional Notes  VSS.  [facility] RN monitoring vitals throughout procedure.  Patient tolerated procedure well.     EXPAREL 20 ML

## 2018-10-04 NOTE — PLAN OF CARE
Problem: Patient Care Overview  Goal: Plan of Care Review  Outcome: Ongoing (interventions implemented as appropriate)  PT AAO X4. PT able to verbalize needs/want. Plan of care reviewed with patient/spouce upon transfer to floor.  Patient verbalized understanding. IV fluids infusing as ordered. IV antibiotics infused as ordered. Pain monitored and moderately controlled with PRN pain medication.  at bedside attentive to patient. Arnold catheter in place flowing yellow urine. Patient has remained free from fall/injury.     Side rails up X2, bed in lowest, locked position, needs attended to ,  call light within reach will continue to monitor.

## 2018-10-04 NOTE — PLAN OF CARE
Pt ambulated from the waiting room to PreOp with no problems noted.   PT, PTT, Cath UA and UPT done in PreOp and reported to .  UPT = Negative.  PreOp medications given per Anesthesia.

## 2018-10-04 NOTE — PLAN OF CARE
Problem: Physical Therapy Goal  Goal: Physical Therapy Goal  Outcome: Ongoing (interventions implemented as appropriate)  Goals to be met by: 10/18/2018     Patient will increase functional independence with mobility by performin. Supine to sit with Modified Avery  2. Sit to supine with Modified Avery  3. Sit to stand transfer with Modified Avery  4. Bed to chair transfer with Modified Avery using Rolling Walker  5. Gait  x 150 feet with Modified Avery using Rolling Walker.   6. Ascend/descend 5 stair with right Handrails Modified Avery using Rolling Walker.   7. Lower extremity exercise program x10-20 reps per handout, with independence    Comments: Goals established and progressing

## 2018-10-04 NOTE — OP NOTE
DATE OF PROCEDURE:  10/04/2018    PREOPERATIVE DIAGNOSIS:  Symptomatic osteoarthritis, right knee.    FINAL DIAGNOSIS:  Symptomatic osteoarthritis, right knee.    PROCEDURES PERFORMED:  Jayesh and Jayesh PFC rotating platform right total   knee arthroplasty.    SURGEON:  Markie Bang M.D.    ASSISTANT:  PAT Billy.    ANESTHESIA:  Spinal.    OPERATIVE REPORT:  The patient was brought to the Operating Room, placed on   table in supine position.  She has been given a block on the right lower   extremity and also spinal anesthetic.  Jose Armando Josue, physician's assistant   served as first surgical assistant.  Due to complexity and nature of the   surgery, first surgical assistant was necessary.  No qualified resident   physician was available.    A tourniquet was placed on the proximal right thigh and right lower extremity   was cleansed with alcohol, then prepped with ChloraPrep solution and draped in   the usual fashion.  We exsanguinated the leg with Esmarch tourniquet and with   the knee flexed, inflated pneumatic tourniquet to 350 mmHg.  Straight anterior   incision was made centered over the patella and then a medial arthrotomy made.    The patella was everted.  The knee was flexed.  Then, using the Jayesh and   Jayesh intramedullary femoral guide, extramedullary tibial guide, we sized the   femur to a size 4 femoral component and tibia to a size 3 tibial tray.  Trial   components were inserted, ligamentous balancing was achieved.  Range of motion   was normal.  We removed the articular surface from the patella, a size 38   patellar button was chosen.  All trial components were inserted and the knee was   brought through a range of motion.  Then, the trial components were removed.    Using the pulsatile lavage, we thoroughly irrigated the knee joint while   methylmethacrylate was prepared.  We then cemented a size 3 tibial baseplate.    We used the porous coated noncemented size 4 narrow DePuy  Jayesh and Jayesh   femoral component.  We then cemented in 38 mm all polyethylene patellar   component.  We then used trial tibial spacers and trialed a 10 mm rotating   Jayesh and Jayesh platform tibial component.  The tourniquet was deflated.    Hemostasis was obtained with electrocautery.  The knee was then irrigated with   pulsatile lavage once again and then a size 10 rotating platform polyethylene   tibial component was inserted.  The knee was reduced.  Range of motion was   normal.  The patella was stable.  A drain was brought through the separate stab   incision.  The wound was closed in layers with a combination of absorbable and   nonabsorbable sutures and staples were used on the skin followed by sterile   dressings and a knee immobilizer.  The patient was awakened and brought to   Recovery Room in stable condition.      MAYTE  dd: 10/04/2018 08:55:16 (CDT)  td: 10/04/2018 11:05:57 (CDT)  Doc ID   #[id number]  Job ID #[id number]    CC:

## 2018-10-04 NOTE — NURSING
Patient arrived to floor via bed. Report received from Vinny. VS stable. PIV LR infusing at 125ml/h. Patient denies pain.

## 2018-10-04 NOTE — ANESTHESIA PREPROCEDURE EVALUATION
10/04/2018  Carola Blanco is a 52 y.o., female.    Anesthesia Evaluation    I have reviewed the Patient Summary Reports.    I have reviewed the Nursing Notes.   I have reviewed the Medications.     Review of Systems  Anesthesia Hx:  No problems with previous Anesthesia Denies Hx of Anesthetic complications    Social:  Non-Smoker    Cardiovascular:   Denies Hypertension.  Denies MI.  Denies CAD.    Denies CABG/stent.   Denies Angina.    Pulmonary:   Denies COPD.  Denies Asthma.  Denies Recent URI.    Renal/:   Denies Chronic Renal Disease.     Hepatic/GI:   Denies GERD. Denies Liver Disease.    Musculoskeletal:   Arthritis     Neurological:   Denies TIA. Denies CVA. Denies Seizures.    Endocrine:   Denies Diabetes. Denies Hypothyroidism.    Psych:   Denies Psychiatric History.          Physical Exam  General:  Obesity    Airway/Jaw/Neck:  Airway Findings: Mouth Opening: Normal Tongue: Normal  General Airway Assessment: Adult, Good  Mallampati: II  Improves to II with phonation.  TM Distance: 4-6 cm      Dental:  Dental Findings: In tact   Chest/Lungs:  Chest/Lungs Findings: Clear to auscultation, Normal Respiratory Rate     Heart/Vascular:  Heart Findings: Rate: Normal  Rhythm: Regular Rhythm  Sounds: Normal  Heart murmur: negative       Mental Status:  Mental Status Findings:  Cooperative, Alert and Oriented         Anesthesia Plan  Type of Anesthesia, risks & benefits discussed:  Anesthesia Type:  spinal  Patient's Preference:   Intra-op Monitoring Plan: standard ASA monitors  Intra-op Monitoring Plan Comments:   Post Op Pain Control Plan:   Post Op Pain Control Plan Comments:   Induction:   IV  Beta Blocker:  Patient is not currently on a Beta-Blocker (No further documentation required).       Informed Consent: Patient understands risks and agrees with Anesthesia plan.  Questions answered. Anesthesia  consent signed with patient.  ASA Score: 2     Day of Surgery Review of History & Physical: I have interviewed and examined the patient. I have reviewed the patient's H&P dated:  There are no significant changes.  H&P update referred to the surgeon.         Ready For Surgery From Anesthesia Perspective.

## 2018-10-04 NOTE — PLAN OF CARE
Report to BASSEM. Patient in no distress, no n/v or shivers. VS stable, family at bedside. Dressing to right leg dry,, intact, with immobilizer/

## 2018-10-04 NOTE — H&P
CC/Indication for Procedure: 52 y.o. female with Primary osteoarthritis of right knee [M17.11]  Primary osteoarthritis of right knee [M17.11].    Patient scheduled for NC TOTAL KNEE ARTHROPLASTY [[id number]] (ARTHROPLASTY, KNEE).    Past Medical History:   Diagnosis Date    Arthritis     Hypertension      Past Surgical History:   Procedure Laterality Date    ARTHROPLASTY-KNEE Left 4/19/2018    Performed by [facility] at [facility]    JOINT REPLACEMENT      KNEE SURGERY Left 04/19/2018     Family History   Problem Relation Age of Onset    Cancer Mother         breast    Arthritis Mother     Birth defects Mother     Cancer Father         lung    Arthritis Maternal Grandmother      Social History     Socioeconomic History    Marital status:      Spouse name: Not on file    Number of children: Not on file    Years of education: Not on file    Highest education level: Not on file   Social Needs    Financial resource strain: Not on file    Food insecurity - worry: Not on file    Food insecurity - inability: Not on file    Transportation needs - medical: Not on file    Transportation needs - non-medical: Not on file   Occupational History    Not on file   Tobacco Use    Smoking status: Never Smoker    Smokeless tobacco: Never Used   Substance and Sexual Activity    Alcohol use: No    Drug use: No    Sexual activity: Yes     Birth control/protection: Injection   Other Topics Concern    Not on file   Social History Narrative    Not on file       Review of patient's allergies indicates:  No Known Allergies      Current Facility-Administered Medications:     0.9%  NaCl infusion, , Intravenous, Continuous, Suzanna Navarro MD    acetaminophen (10 mg/mL) injection 1,000 mg, 1,000 mg, Intravenous, Once, Suzanna Navarro MD    cefazolin (ANCEF) 2 gram in dextrose 5% 50 mL IVPB (premix), 2 g, Intravenous, On Call Procedure, Markie Bang MD    celecoxib capsule 200 mg, 200 mg, Oral, Once,  Suzanna Navarro MD    lactated ringers infusion, , Intravenous, Continuous, Stanton Sommer MD    lidocaine (PF) 10 mg/ml (1%) injection 10 mg, 1 mL, Intradermal, Once, Suzanna Navarro MD    oxyCODONE 12 hr tablet 10 mg, 10 mg, Oral, Once, Suzanna Navarro MD    pregabalin capsule 75 mg, 75 mg, Oral, Once, Suzanna Navarro MD    ROS:    Denies chest pain or palpitations  Denies shortness of breath  Denies fevers or chills  Denies chest pain  Denies abdominal pain    PE:    General Appearance: Well nourished  Orientation: Oriented to time, place, person  Mental Status: Alert  Heart: RRR  Lungs: CTA  Abdomen: Soft and non-tender  Right knee-limited rom with pain    Anesthesia/Surgery risks, benefits and alternative options discussed and understood by patient/family.    This note was created using Dragon voice recognition software that occasionally misinterpreted phrases or words.

## 2018-10-04 NOTE — TRANSFER OF CARE
"Anesthesia Transfer of Care Note    Patient: Carola Blanco    Procedure(s) Performed: Procedure(s) (LRB):  ARTHROPLASTY, KNEE (Right)    Patient location: PACU    Anesthesia Type: MAC    Transport from OR: Transported from OR on 2-3 L/min O2 by NC with adequate spontaneous ventilation    Post pain: adequate analgesia    Post assessment: no apparent anesthetic complications and tolerated procedure well    Post vital signs: stable    Level of consciousness: awake, alert and oriented    Nausea/Vomiting: no nausea/vomiting    Complications: none    Transfer of care protocol was followed      Last vitals:   Visit Vitals  /74 (BP Location: Left arm, Patient Position: Lying)   Pulse 70   Temp 36.7 °C (98.1 °F) (Temporal)   Resp 18   Ht 5' 5" (1.651 m)   Wt 113.4 kg (250 lb)   SpO2 98%   Breastfeeding? No   BMI 41.60 kg/m²     "

## 2018-10-04 NOTE — H&P
PCP: Prashanth Cabrera NP    History & Physical    Chief Complaint: s/p Right total knee arthroplasty    History of Present Illness:  Patient is a 52 y.o. female admitted to Hospitalist Service from [facility] s/p right total knee arthroplasty performed by Dr. Bang. Patient reportedly has past medical history significant for OA and Hypertension. Post-operatively, patient denied chest pain, shortness of breath, abdominal pain, nausea, vomiting, headache, vision changes, focal neuro-deficits, cough or fever.    Past Medical History:   Diagnosis Date    Arthritis     Hypertension      Past Surgical History:   Procedure Laterality Date    ARTHROPLASTY-KNEE Left 4/19/2018    [facility]    JOINT REPLACEMENT      KNEE SURGERY Left 04/19/2018     Family History   Problem Relation Age of Onset    Cancer Mother         breast    Arthritis Mother     Birth defects Mother     Cancer Father         lung    Arthritis Maternal Grandmother      Social History     Tobacco Use    Smoking status: Never Smoker    Smokeless tobacco: Never Used   Substance Use Topics    Alcohol use: No    Drug use: No      Review of patient's allergies indicates:  No Known Allergies  PTA Medications   Medication Sig    sulfamethoxazole-trimethoprim 400-80mg (BACTRIM,SEPTRA) 400-80 mg per tablet Take 1 tablet by mouth every 12 (twelve) hours.    acetaminophen (TYLENOL) 325 MG tablet Take 325 mg by mouth every 6 (six) hours as needed for Pain.    diclofenac sodium (VOLTAREN) 1 % Gel Apply 4 g topically 4 (four) times daily.    lisinopril 10 MG tablet Take 1 tablet (10 mg total) by mouth every evening.    metronidazole 1% (METROGEL) 1 % Gel Apply topically once daily.    naproxen (NAPROSYN) 500 MG tablet TAKE ONE TABLET BY MOUTH TWICE DAILY    traZODone (DESYREL) 50 MG tablet Take 2 tablets (100 mg total) by mouth every evening. 1-2 tablets by mouth daily at bedtime when necessary insomnia    zolpidem  (AMBIEN) 10 mg Tab Take 5 mg by mouth nightly as needed.      Review of Systems:  Constitutional: no fever or chills  Eyes: no visual changes  Ears, nose, mouth, throat, and face: no nasal congestion or sore throat  Respiratory: no cough or shorness of breath  Cardiovascular: no chest pain or palpitations  Gastrointestinal: no nausea or vomiting, no abdominal pain or change in bowel habits  Genitourinary: no hematuria or dysuria  Integument/breast: no rash or pruritis  Hematologic/lymphatic: no easy bruising or lymphadenopathy  Musculoskeletal: see HPI  Neurological: no seizures or tremors.  Behavioral/Psych: no auditory or visual hallucinations  Endocrine: no heat or cold intolerance     OBJECTIVE:     Vital Signs (Most Recent)  Temp: 97.6 °F (36.4 °C) (10/04/18 0950)  Pulse: (!) 58 (10/04/18 0950)  Resp: 12 (10/04/18 0950)  BP: 110/67 (10/04/18 0950)  SpO2: 99 % (10/04/18 0950)    Physical Exam:  General appearance: well developed, appears stated age  Head: normocephalic, atraumatic  Eyes:  conjunctivae/corneas clear. PERRL.  Nose: Nares normal. Septum midline.  Throat: lips, mucosa, and tongue normal; teeth and gums normal, no throat erythema.  Neck: supple, symmetrical, trachea midline, no JVD and thyroid not enlarged, symmetric, no tenderness/mass/nodules  Lungs:  clear to auscultation bilaterally and normal respiratory effort  Chest wall: no tenderness  Heart: regular rate and rhythm, S1, S2 normal, no murmur, click, rub or gallop  Abdomen: soft, non-tender non-distented; bowel sounds normal; no masses,  no organomegaly  Extremities: no cyanosis, clubbing or edema. Right knee dressing C/D/I.  Pulses: 2+ and symmetric  Skin: Skin color, texture, turgor normal. No rashes or lesions.  Lymph nodes: Cervical, supraclavicular, and axillary nodes normal.  Neurologic: Normal strength and tone. No focal numbness or weakness. CNII-XII intact.      Laboratory:   CBC:   Recent Labs   Lab  10/04/18   0912   WBC  9.10   RBC   4.42   HGB  11.8*   HCT  36.4*   PLT  333   MCV  82   MCH  26.8*   MCHC  32.6     CMP:   Recent Labs   Lab  10/04/18   0912   GLU  95   CALCIUM  8.6*   NA  137   K  4.5   CO2  28   CL  105   BUN  12   CREATININE  1.0       No results found for: HGBA1C    Diagnostic Results: None    Assessment/Plan:     Active Hospital Problems    Diagnosis  POA    *S/P total knee arthroplasty, right [Z96.651]  Not Applicable    Primary osteoarthritis of right knee [M17.11]  Yes    Primary osteoarthritis of left knee [M17.12]  Continue to follow Orthopedic recommendations.  Needs aggressive incentive spirometry.  Follow hemoglobin and hematocrit closely.  Pain control with IV narcotics and antiemetics as needed.  Physical therapy as per Orthopedics protocol with fall precautions.    Yes    Hypertension [I10]  Chronic problem. Will continue chronic medications and monitor for any changes, adjusting as needed.    Yes     VTE Risk Mitigation (From admission, onward)        Ordered     enoxaparin injection 40 mg  Daily      10/04/18 0857        Guera Liu MD  Department of Hospital Medicine   [facility]

## 2018-10-04 NOTE — ANESTHESIA POSTPROCEDURE EVALUATION
"Anesthesia Post Evaluation    Patient: Carola Blanco    Procedure(s) Performed: Procedure(s) (LRB):  ARTHROPLASTY, KNEE (Right)    Final Anesthesia Type: spinal  Patient location during evaluation: PACU  Patient participation: Yes- Able to Participate  Level of consciousness: awake and alert and oriented  Post-procedure vital signs: reviewed and stable  Pain management: adequate  Airway patency: patent  PONV status at discharge: No PONV  Anesthetic complications: no      Cardiovascular status: blood pressure returned to baseline  Respiratory status: unassisted, spontaneous ventilation and room air  Hydration status: euvolemic  Follow-up not needed.        Visit Vitals  /67   Pulse (!) 58   Temp 36.4 °C (97.6 °F)   Resp 12   Ht 5' 5" (1.651 m)   Wt 113.4 kg (250 lb)   SpO2 99%   Breastfeeding? No   BMI 41.60 kg/m²       Pain/Xander Score: Pain Assessment Performed: Yes (10/4/2018  9:30 AM)  Presence of Pain: denies (10/4/2018  9:30 AM)  Pain Rating Prior to Med Admin: 0 (10/4/2018  6:31 AM)  Xander Score: 10 (10/4/2018  9:30 AM)        "

## 2018-10-05 ENCOUNTER — DOCUMENTATION ONLY (OUTPATIENT)
Dept: ORTHOPEDICS | Facility: CLINIC | Age: 53
End: 2018-10-05

## 2018-10-05 LAB
ANION GAP SERPL CALC-SCNC: 8 MMOL/L
BASOPHILS # BLD AUTO: 0 K/UL
BASOPHILS NFR BLD: 0.3 %
BUN SERPL-MCNC: 7 MG/DL
CALCIUM SERPL-MCNC: 8.9 MG/DL
CHLORIDE SERPL-SCNC: 102 MMOL/L
CO2 SERPL-SCNC: 26 MMOL/L
CREAT SERPL-MCNC: 0.8 MG/DL
DIFFERENTIAL METHOD: ABNORMAL
EOSINOPHIL # BLD AUTO: 0.3 K/UL
EOSINOPHIL NFR BLD: 2.2 %
ERYTHROCYTE [DISTWIDTH] IN BLOOD BY AUTOMATED COUNT: 13.8 %
EST. GFR  (AFRICAN AMERICAN): >60 ML/MIN/1.73 M^2
EST. GFR  (NON AFRICAN AMERICAN): >60 ML/MIN/1.73 M^2
GLUCOSE SERPL-MCNC: 124 MG/DL
HCT VFR BLD AUTO: 35.7 %
HGB BLD-MCNC: 11.7 G/DL
LYMPHOCYTES # BLD AUTO: 1.9 K/UL
LYMPHOCYTES NFR BLD: 15.6 %
MCH RBC QN AUTO: 27.1 PG
MCHC RBC AUTO-ENTMCNC: 32.8 G/DL
MCV RBC AUTO: 83 FL
MONOCYTES # BLD AUTO: 1 K/UL
MONOCYTES NFR BLD: 7.8 %
NEUTROPHILS # BLD AUTO: 9.1 K/UL
NEUTROPHILS NFR BLD: 74.1 %
PLATELET # BLD AUTO: 305 K/UL
PMV BLD AUTO: 8.3 FL
POTASSIUM SERPL-SCNC: 4 MMOL/L
RBC # BLD AUTO: 4.33 M/UL
SODIUM SERPL-SCNC: 136 MMOL/L
WBC # BLD AUTO: 12.3 K/UL

## 2018-10-05 PROCEDURE — 63600175 PHARM REV CODE 636 W HCPCS: Performed by: PHYSICIAN ASSISTANT

## 2018-10-05 PROCEDURE — 63600175 PHARM REV CODE 636 W HCPCS: Performed by: NURSE PRACTITIONER

## 2018-10-05 PROCEDURE — 94761 N-INVAS EAR/PLS OXIMETRY MLT: CPT

## 2018-10-05 PROCEDURE — 25000003 PHARM REV CODE 250: Performed by: PHYSICIAN ASSISTANT

## 2018-10-05 PROCEDURE — 80048 BASIC METABOLIC PNL TOTAL CA: CPT

## 2018-10-05 PROCEDURE — 99232 SBSQ HOSP IP/OBS MODERATE 35: CPT | Mod: ,,, | Performed by: INTERNAL MEDICINE

## 2018-10-05 PROCEDURE — 25000003 PHARM REV CODE 250: Performed by: INTERNAL MEDICINE

## 2018-10-05 PROCEDURE — 94799 UNLISTED PULMONARY SVC/PX: CPT

## 2018-10-05 PROCEDURE — 97116 GAIT TRAINING THERAPY: CPT

## 2018-10-05 PROCEDURE — 12000002 HC ACUTE/MED SURGE SEMI-PRIVATE ROOM

## 2018-10-05 PROCEDURE — 36415 COLL VENOUS BLD VENIPUNCTURE: CPT

## 2018-10-05 PROCEDURE — 97530 THERAPEUTIC ACTIVITIES: CPT

## 2018-10-05 PROCEDURE — 85025 COMPLETE CBC W/AUTO DIFF WBC: CPT

## 2018-10-05 RX ORDER — OXYCODONE AND ACETAMINOPHEN 10; 325 MG/1; MG/1
1 TABLET ORAL EVERY 4 HOURS PRN
Refills: 0
Start: 2018-10-05 | End: 2018-10-17 | Stop reason: SDUPTHER

## 2018-10-05 RX ORDER — ASPIRIN 325 MG
325 TABLET ORAL DAILY
Refills: 0 | COMMUNITY
Start: 2018-10-05 | End: 2023-03-21

## 2018-10-05 RX ORDER — HYDROMORPHONE HCL IN 0.9% NACL 6 MG/30 ML
PATIENT CONTROLLED ANALGESIA SYRINGE INTRAVENOUS CONTINUOUS
Status: DISCONTINUED | OUTPATIENT
Start: 2018-10-05 | End: 2018-10-05

## 2018-10-05 RX ORDER — NALOXONE HCL 0.4 MG/ML
0.02 VIAL (ML) INJECTION
Status: DISCONTINUED | OUTPATIENT
Start: 2018-10-05 | End: 2018-10-06 | Stop reason: HOSPADM

## 2018-10-05 RX ORDER — HYDROMORPHONE HYDROCHLORIDE 2 MG/ML
2 INJECTION, SOLUTION INTRAMUSCULAR; INTRAVENOUS; SUBCUTANEOUS EVERY 4 HOURS PRN
Status: DISCONTINUED | OUTPATIENT
Start: 2018-10-05 | End: 2018-10-06 | Stop reason: HOSPADM

## 2018-10-05 RX ORDER — ACETAMINOPHEN 325 MG/1
325 TABLET ORAL 2 TIMES DAILY
Refills: 0
Start: 2018-10-05 | End: 2018-11-04

## 2018-10-05 RX ORDER — METHOCARBAMOL 750 MG/1
750 TABLET, FILM COATED ORAL 4 TIMES DAILY
Status: DISCONTINUED | OUTPATIENT
Start: 2018-10-05 | End: 2018-10-06 | Stop reason: HOSPADM

## 2018-10-05 RX ADMIN — CEFAZOLIN SODIUM 2 G: 2 SOLUTION INTRAVENOUS at 08:10

## 2018-10-05 RX ADMIN — OXYCODONE HYDROCHLORIDE 10 MG: 10 TABLET ORAL at 02:10

## 2018-10-05 RX ADMIN — PREGABALIN 75 MG: 75 CAPSULE ORAL at 08:10

## 2018-10-05 RX ADMIN — FAMOTIDINE 20 MG: 20 TABLET ORAL at 08:10

## 2018-10-05 RX ADMIN — METRONIDAZOLE: 7.5 GEL TOPICAL at 08:10

## 2018-10-05 RX ADMIN — MUPIROCIN 1 G: 20 OINTMENT TOPICAL at 08:10

## 2018-10-05 RX ADMIN — MUPIROCIN 1 G: 20 OINTMENT TOPICAL at 09:10

## 2018-10-05 RX ADMIN — DOCUSATE SODIUM 100 MG: 100 CAPSULE, LIQUID FILLED ORAL at 08:10

## 2018-10-05 RX ADMIN — LISINOPRIL 10 MG: 10 TABLET ORAL at 09:10

## 2018-10-05 RX ADMIN — METHOCARBAMOL 750 MG: 750 TABLET ORAL at 12:10

## 2018-10-05 RX ADMIN — ONDANSETRON 4 MG: 2 INJECTION INTRAMUSCULAR; INTRAVENOUS at 09:10

## 2018-10-05 RX ADMIN — DOCUSATE SODIUM 100 MG: 100 CAPSULE, LIQUID FILLED ORAL at 09:10

## 2018-10-05 RX ADMIN — PREGABALIN 75 MG: 75 CAPSULE ORAL at 09:10

## 2018-10-05 RX ADMIN — ENOXAPARIN SODIUM 40 MG: 100 INJECTION SUBCUTANEOUS at 04:10

## 2018-10-05 RX ADMIN — METHOCARBAMOL 750 MG: 750 TABLET ORAL at 09:10

## 2018-10-05 RX ADMIN — ACETAMINOPHEN 1000 MG: 10 INJECTION, SOLUTION INTRAVENOUS at 04:10

## 2018-10-05 RX ADMIN — SULFAMETHOXAZOLE AND TRIMETHOPRIM 1 TABLET: 400; 80 TABLET ORAL at 09:10

## 2018-10-05 RX ADMIN — METHOCARBAMOL 750 MG: 750 TABLET ORAL at 04:10

## 2018-10-05 RX ADMIN — OXYCODONE HYDROCHLORIDE 10 MG: 10 TABLET ORAL at 06:10

## 2018-10-05 RX ADMIN — ZOLPIDEM TARTRATE 5 MG: 5 TABLET ORAL at 09:10

## 2018-10-05 RX ADMIN — POLYETHYLENE GLYCOL 3350 17 G: 17 POWDER, FOR SOLUTION ORAL at 08:10

## 2018-10-05 RX ADMIN — METHOCARBAMOL 750 MG: 750 TABLET ORAL at 08:10

## 2018-10-05 RX ADMIN — FAMOTIDINE 20 MG: 20 TABLET ORAL at 09:10

## 2018-10-05 RX ADMIN — TRAZODONE HYDROCHLORIDE 100 MG: 50 TABLET ORAL at 09:10

## 2018-10-05 RX ADMIN — OXYCODONE HYDROCHLORIDE 5 MG: 5 TABLET ORAL at 02:10

## 2018-10-05 RX ADMIN — ACETAMINOPHEN 1000 MG: 10 INJECTION, SOLUTION INTRAVENOUS at 12:10

## 2018-10-05 RX ADMIN — HYDROMORPHONE HYDROCHLORIDE 2 MG: 2 INJECTION INTRAMUSCULAR; INTRAVENOUS; SUBCUTANEOUS at 07:10

## 2018-10-05 RX ADMIN — CEFAZOLIN SODIUM 2 G: 2 SOLUTION INTRAVENOUS at 12:10

## 2018-10-05 RX ADMIN — TRANEXAMIC ACID 1134 MG: 100 INJECTION, SOLUTION INTRAVENOUS at 07:10

## 2018-10-05 NOTE — PLAN OF CARE
Met with pt to complete her assessment.  Educated pt on the blue discharge folder and left the folder in the room.  Pt, who is independent with her self care, lives with her spouse, adult daughter and two grandchildren.  Pt denies current home health services and has a rolling walker, SC and BSC at home.  Pt verified her PCP as Prashanth billy NP and insurance as BC/BS.  Pt's discharge disposition is home with [facility].  Obtained signature for the disclosure form.       10/05/18 1215   Discharge Assessment   Assessment Type Discharge Planning Assessment   Confirmed/corrected address and phone number on facesheet? Yes   Assessment information obtained from? Patient   Communicated expected length of stay with patient/caregiver yes   Prior to hospitilization cognitive status: Alert/Oriented   Prior to hospitalization functional status: Independent   Current cognitive status: Alert/Oriented   Current Functional Status: Independent;Assistive Equipment   Lives With child(carlos), adult;grandchild(carlos);spouse  (Pt's spouse, adult daughter and two granchildren live with pt. )   Able to Return to Prior Arrangements yes   Is patient able to care for self after discharge? Yes   Who are your caregiver(s) and their phone number(s)? (spouse Philip, [phone])   Patient's perception of discharge disposition home health   Readmission Within The Last 30 Days no previous admission in last 30 days   Patient currently being followed by outpatient case management? No   Patient currently receives any other outside agency services? No   Equipment Currently Used at Home bedside commode;shower chair;walker, rolling  (Pt has the DME however was not using it prior to this [facility]". )   Do you have any problems affording any of your prescribed medications? No  (pharmacy is [facility] on [address])   Is the patient taking medications as prescribed? yes   Does the patient have transportation home? Yes   Transportation Available family or  friend will provide   Does the patient receive services at the [facility]? No   Discharge Plan A Home Health  ([facility])   Patient/Family In Agreement With Plan yes

## 2018-10-05 NOTE — PROGRESS NOTES
10/04/18 2023   Patient Assessment/Suction   Level of Consciousness (AVPU) alert   PRE-TX-O2-ETCO2   O2 Device (Oxygen Therapy) room air   SpO2 96 %   Pulse Oximetry Type Intermittent   Pulse 75   Resp 16   Incentive Spirometer   $ Incentive Spirometer Charges refused  (in pain)   Administration (Incentive Spirometer) refused  (in pain)

## 2018-10-05 NOTE — PT/OT/SLP PROGRESS
Physical Therapy Treatment    Patient Name:  Carola Blanco   MRN:  [id number]    Recommendations:     Discharge Recommendations:  home health PT   Discharge Equipment Recommendations: walker, rolling     Assessment:     Carola Blanco is a 52 y.o. female admitted with a medical diagnosis of S/P total knee arthroplasty, right.  She presents with the following impairments/functional limitations:  weakness, impaired endurance, impaired functional mobilty, gait instability, decreased lower extremity function, pain, decreased ROM, orthopedic precautions .    Rehab Prognosis:  good; patient would benefit from acute skilled PT services to address these deficits and reach maximum level of function.      Recent Surgery: Procedure(s) (LRB):  ARTHROPLASTY, KNEE (Right) 1 Day Post-Op    Plan:     During this hospitalization, patient to be seen BID to address the above listed problems via gait training, therapeutic activities, therapeutic exercises  · Plan of Care Expires:  10/18/18   Plan of Care Reviewed with: patient    Subjective     Communicated with nurses Melida and Sima prior to session.  Patient found supine upon PT entry to room, agreeable to treatment.      Chief Complaint: R knee pain and generalized fatigue  Patient comments/goals: pt eager to mobilize and reported feeling better than she did this morning  Pain/Comfort:  · Pain Rating 1: (did not rate)  · Location - Side 1: Right  · Location 1: knee  · Pain Addressed 1: Pre-medicate for activity, Reposition, Distraction, Cessation of Activity, Nurse notified    Patients cultural, spiritual, Anabaptism conflicts given the current situation: none    Objective:     Patient found with: peripheral IV(NIRMAL)     General Precautions: Standard, fall   Orthopedic Precautions:RLE weight bearing as tolerated   Braces: Knee immobilizer     Functional Mobility:  · Bed Mobility:     · Scooting: minimum assistance for R LE  · Bridging: CGA for R LE support  · Supine to Sit: minimum  assistance for R LE  · Sit to Supine: minimum assistance for R LE    · Transfers:     · Sit to Stand:  minimum assistance with rolling walker     · Gait: 65' with CGA using RW.         Therapeutic Activities and Exercises:    KI donned prior to OOB     B AP's performed and pt pt encouraged to perform throughout the day to increased circulation      pt assisted BTB post gait.     CPM applied, per M.D request. 0-40 degrees, per pt request. Pt tolerating well. Nurse Melida notified/aware and reported she would later increase pt to 45 degrees, as tolerated.        Patient left supine with all lines intact, call button in reach and nursing  notified..    GOALS:   Multidisciplinary Problems     Physical Therapy Goals        Problem: Physical Therapy Goal    Goal Priority Disciplines Outcome Goal Variances Interventions   Physical Therapy Goal     PT, PT/OT Ongoing (interventions implemented as appropriate)                     Time Tracking:     PT Received On: 10/05/18  PT Start Time: 1300     PT Stop Time: 1334  PT Total Time (min): 34 min     Billable Minutes: Gait Training 10 and Therapeutic Activity 24    Treatment Type: Treatment  PT/PTA: PTA     PTA Visit Number: 1     Miroslava Hays PTA  10/05/2018

## 2018-10-05 NOTE — PLAN OF CARE
10/05/18 0830   Patient Assessment/Suction   Level of Consciousness (AVPU) alert   All Lung Fields Breath Sounds clear   PRE-TX-O2-ETCO2   O2 Device (Oxygen Therapy) room air   SpO2 95 %   Pulse Oximetry Type Intermittent   $ Pulse Oximetry - Multiple Charge Pulse Oximetry - Multiple   Pulse 82   Resp 18   Incentive Spirometer   $ Incentive Spirometer Charges done with encouragement   Predicted Level (mL) (Incentive Spirometer) 2500   Administration (Incentive Spirometer) done with encouragement   Number of Repetitions (Incentive Spirometer) 10   Level (mL) (Incentive Spirometer) 1250   Patient Tolerance good

## 2018-10-05 NOTE — PROGRESS NOTES
[facility]  Orthopedics  Progress Note    Patient Name: Carola Blanco  MRN: [id number]  Admission Date: 10/4/2018  Hospital Length of Stay: 1 days  Attending Provider: Guera Liu MD  Primary Care Provider: Prashanth Cabrera NP  Follow-up For: Procedure(s) (LRB):  ARTHROPLASTY, KNEE (Right)    Post-Operative Day: 1 Day Post-Op  Subjective:     Principal Problem:S/P total knee arthroplasty, right    Principal Orthopedic Problem: S/P R TKA    Interval History: pain    Review of patient's allergies indicates:  No Known Allergies    Current Facility-Administered Medications   Medication    acetaminophen (10 mg/mL) injection 1,000 mg    cefazolin (ANCEF) 2 gram in dextrose 5% 50 mL IVPB (premix)    docusate sodium capsule 100 mg    enoxaparin injection 40 mg    famotidine tablet 20 mg    HYDROmorphone (DILAUDID) 20 mg in dextrose 5 % 100 mL infusion    hydromorphone (PF) injection 2 mg    lactated ringers infusion    lisinopril tablet 10 mg    loperamide capsule 2 mg    methocarbamol tablet 750 mg    metroNIDAZOLE 0.75 % gel    mupirocin 2 % ointment 1 g    ondansetron disintegrating tablet 8 mg    ondansetron injection 4 mg    oxyCODONE immediate release tablet 5 mg    oxyCODONE immediate release tablet Tab 10 mg    polyethylene glycol packet 17 g    pregabalin capsule 75 mg    sodium chloride 0.9% flush 5 mL    sulfamethoxazole-trimethoprim 400-80mg per tablet 1 tablet    tranexamic acid (CYKLOKAPRON) 1,134 mg in sodium chloride 0.9% 100 mL    traZODone tablet 100 mg    zolpidem tablet 5 mg     Objective:     Vital Signs (Most Recent):  Temp: 97.1 °F (36.2 °C) (10/04/18 1938)  Pulse: 75 (10/04/18 2023)  Resp: 16 (10/04/18 2023)  BP: 125/67 (10/04/18 1938)  SpO2: 96 % (10/04/18 2023) Vital Signs (24h Range):  Temp:  [97.1 °F (36.2 °C)-98.1 °F (36.7 °C)] 97.1 °F (36.2 °C)  Pulse:  [58-75] 75  Resp:  [12-49] 16  SpO2:  [95 %-100 %] 96 %  BP: (103-128)/(59-71) 125/67     Weight: 113.4  "kg (250 lb)  Height: 5' 5" (165.1 cm)  Body mass index is 41.6 kg/m².      Intake/Output Summary (Last 24 hours) at 10/5/2018 0729  Last data filed at 10/4/2018 1900  Gross per 24 hour   Intake 2403.84 ml   Output 760 ml   Net 1643.84 ml       General    Nursing note and vitals reviewed.  Constitutional: She is oriented to person, place, and time. She appears well-developed and well-nourished.   Pulmonary/Chest: Effort normal.   Abdominal: Soft. Bowel sounds are normal.   Neurological: She is alert and oriented to person, place, and time.   Psychiatric: She has a normal mood and affect. Her behavior is normal.           Right Knee Exam     Comments:  RLE DNVI. Dressings clean and dry.      Significant Labs:   CBC:   Recent Labs   Lab  10/04/18   0912  10/05/18   0610   WBC  9.10  12.30   HGB  11.8*  11.7*   HCT  36.4*  35.7*   PLT  333  305     CMP:   Recent Labs   Lab  10/04/18   0912  10/05/18   0610   NA  137  136   K  4.5  4.0   CL  105  102   CO2  28  26   GLU  95  124*   BUN  12  7   CREATININE  1.0  0.8   CALCIUM  8.6*  8.9   ANIONGAP  4*  8   EGFRNONAA  >60  >60     All pertinent labs within the past 24 hours have been reviewed.    Significant Imaging: X-Ray: I have reviewed all pertinent results/findings and my personal findings are:  Right knee total arthroplasty with no acute hardware complication.    Assessment/Plan:     * S/P total knee arthroplasty, right    Robaxin added  Dilaudid added  Cont OOB activity with PT  CPM ordered  Plan for DC home with HH tomorrow              PAT PATRICIO  Orthopedics  [facility]  "

## 2018-10-05 NOTE — PLAN OF CARE
Problem: Physical Therapy Goal  Goal: Physical Therapy Goal  Outcome: Ongoing (interventions implemented as appropriate)  PT BID for bed mobility, transfer training, gait training and ROM

## 2018-10-05 NOTE — PLAN OF CARE
Problem: Patient Care Overview  Goal: Plan of Care Review  Outcome: Ongoing (interventions implemented as appropriate)  Reviewed plan of care reviewed with pt and spouse, both verb complete understanding. Dr. Liu at bedside this AM. Pain and VS monitored and controlled per orders. Arnold catheter removed at 1200 per orders. Pt got up with PT to the chair. Paulina and CPM in use. Ice applied. Bed in lowest position, call light within reach.

## 2018-10-05 NOTE — ASSESSMENT & PLAN NOTE
Robaxin added  Dilaudid added  Cont OOB activity with PT  CPM ordered  Plan for DC home with HH tomorrow

## 2018-10-05 NOTE — SUBJECTIVE & OBJECTIVE
"Principal Problem:S/P total knee arthroplasty, right    Principal Orthopedic Problem: S/P R TKA    Interval History: pain    Review of patient's allergies indicates:  No Known Allergies    Current Facility-Administered Medications   Medication    acetaminophen (10 mg/mL) injection 1,000 mg    cefazolin (ANCEF) 2 gram in dextrose 5% 50 mL IVPB (premix)    docusate sodium capsule 100 mg    enoxaparin injection 40 mg    famotidine tablet 20 mg    HYDROmorphone (DILAUDID) 20 mg in dextrose 5 % 100 mL infusion    hydromorphone (PF) injection 2 mg    lactated ringers infusion    lisinopril tablet 10 mg    loperamide capsule 2 mg    methocarbamol tablet 750 mg    metroNIDAZOLE 0.75 % gel    mupirocin 2 % ointment 1 g    ondansetron disintegrating tablet 8 mg    ondansetron injection 4 mg    oxyCODONE immediate release tablet 5 mg    oxyCODONE immediate release tablet Tab 10 mg    polyethylene glycol packet 17 g    pregabalin capsule 75 mg    sodium chloride 0.9% flush 5 mL    sulfamethoxazole-trimethoprim 400-80mg per tablet 1 tablet    tranexamic acid (CYKLOKAPRON) 1,134 mg in sodium chloride 0.9% 100 mL    traZODone tablet 100 mg    zolpidem tablet 5 mg     Objective:     Vital Signs (Most Recent):  Temp: 97.1 °F (36.2 °C) (10/04/18 1938)  Pulse: 75 (10/04/18 2023)  Resp: 16 (10/04/18 2023)  BP: 125/67 (10/04/18 1938)  SpO2: 96 % (10/04/18 2023) Vital Signs (24h Range):  Temp:  [97.1 °F (36.2 °C)-98.1 °F (36.7 °C)] 97.1 °F (36.2 °C)  Pulse:  [58-75] 75  Resp:  [12-49] 16  SpO2:  [95 %-100 %] 96 %  BP: (103-128)/(59-71) 125/67     Weight: 113.4 kg (250 lb)  Height: 5' 5" (165.1 cm)  Body mass index is 41.6 kg/m².      Intake/Output Summary (Last 24 hours) at 10/5/2018 0739  Last data filed at 10/4/2018 1900  Gross per 24 hour   Intake 2403.84 ml   Output 760 ml   Net 1643.84 ml       General    Nursing note and vitals reviewed.  Constitutional: She is oriented to person, place, and time. She " appears well-developed and well-nourished.   Pulmonary/Chest: Effort normal.   Abdominal: Soft. Bowel sounds are normal.   Neurological: She is alert and oriented to person, place, and time.   Psychiatric: She has a normal mood and affect. Her behavior is normal.           Right Knee Exam     Comments:  RLE DNVI. Dressings clean and dry.      Significant Labs:   CBC:   Recent Labs   Lab  10/04/18   0912  10/05/18   0610   WBC  9.10  12.30   HGB  11.8*  11.7*   HCT  36.4*  35.7*   PLT  333  305     CMP:   Recent Labs   Lab  10/04/18   0912  10/05/18   0610   NA  137  136   K  4.5  4.0   CL  105  102   CO2  28  26   GLU  95  124*   BUN  12  7   CREATININE  1.0  0.8   CALCIUM  8.6*  8.9   ANIONGAP  4*  8   EGFRNONAA  >60  >60     All pertinent labs within the past 24 hours have been reviewed.    Significant Imaging: X-Ray: I have reviewed all pertinent results/findings and my personal findings are:  Right knee total arthroplasty with no acute hardware complication.

## 2018-10-05 NOTE — PROGRESS NOTES
Progress Note  Hospital Medicine  Patient Name:Carola Blanco  MRN:  [id number]  Patient Class: IP- Inpatient  Admit Date: 10/4/2018  Length of Stay: 1 days  Expected Discharge Date:   Attending Physician: Guera Liu MD  Primary Care Provider:  Prashanth Cabrera NP    SUBJECTIVE:     Principal Problem: S/P total knee arthroplasty, right  Initial history of present illness: Patient is a 52 y.o. female admitted to Hospitalist Service from [facility] s/p right total knee arthroplasty performed by Dr. Bang. Patient reportedly has past medical history significant for OA and Hypertension. Post-operatively, patient denied chest pain, shortness of breath, abdominal pain, nausea, vomiting, headache, vision changes, focal neuro-deficits, cough or fever.    PMH/PSH/SH/FH/Meds: reviewed.    Symptoms/Review of Systems:  Doing well. Pain controlled. No shortness of breath, cough, chest pain or headache, fever or abdominal pain.     Diet:  Adequate intake.    Activity level: Normal.    Pain:  Patient reports no pain.       OBJECTIVE:   Vital Signs (Most Recent):      Temp: 97.1 °F (36.2 °C) (10/04/18 1938)  Pulse: 75 (10/04/18 2023)  Resp: 16 (10/04/18 2023)  BP: 125/67 (10/04/18 1938)  SpO2: 96 % (10/04/18 2023)       Vital Signs Range (Last 24H):  Temp:  [97.1 °F (36.2 °C)-98.1 °F (36.7 °C)]   Pulse:  [58-75]   Resp:  [12-49]   BP: (103-128)/(59-71)   SpO2:  [95 %-100 %]     Weight: 113.4 kg (250 lb)  Body mass index is 41.6 kg/m².    Intake/Output Summary (Last 24 hours) at 10/5/2018 0714  Last data filed at 10/4/2018 1900  Gross per 24 hour   Intake 3403.84 ml   Output 760 ml   Net 2643.84 ml     Physical Examination:    General appearance: well developed, appears stated age  Head: normocephalic, atraumatic  Eyes:  conjunctivae/corneas clear. PERRL.  Nose: Nares normal. Septum midline.  Throat: lips, mucosa, and tongue normal; teeth and gums normal, no throat erythema.  Neck: supple, symmetrical, trachea midline, no JVD and  thyroid not enlarged, symmetric, no tenderness/mass/nodules  Lungs:  clear to auscultation bilaterally and normal respiratory effort  Chest wall: no tenderness  Heart: regular rate and rhythm, S1, S2 normal, no murmur, click, rub or gallop  Abdomen: soft, non-tender non-distented; bowel sounds normal; no masses,  no organomegaly  Extremities: no cyanosis, clubbing or edema. Right knee dressing C/D/I.  Pulses: 2+ and symmetric  Skin: Skin color, texture, turgor normal. No rashes or lesions.  Lymph nodes: Cervical, supraclavicular, and axillary nodes normal.  Neurologic: Normal strength and tone. No focal numbness or weakness. CNII-XII intact.     CBC:  Recent Labs   Lab  10/04/18   0912  10/05/18   0610   WBC  9.10  12.30   RBC  4.42  4.33   HGB  11.8*  11.7*   HCT  36.4*  35.7*   PLT  333  305   MCV  82  83   MCH  26.8*  27.1   MCHC  32.6  32.8   BMP  Recent Labs   Lab  10/04/18   0912   GLU  95   NA  137   K  4.5   CL  105   CO2  28   BUN  12   CREATININE  1.0   CALCIUM  8.6*      Diagnostic Results:  Right knee x-ray: Right knee total arthroplasty with no acute hardware complication.    Assessment/Plan:      *S/P total knee arthroplasty, right [Z96.651]   Not Applicable    Primary osteoarthritis of right knee [M17.11]   Yes    Primary osteoarthritis of left knee [M17.12]  Continue to follow Orthopedic recommendations.  Needs aggressive incentive spirometry.  Follow hemoglobin and hematocrit closely.  Pain control with PO narcotics and antiemetics as needed.  Physical therapy as per Orthopedics protocol with fall precautions.      Yes    Hypertension [I10]  Chronic problem. Will continue chronic medications and monitor for any changes, adjusting as needed.      Yes   Likely DC home in AM.    VTE Risk Mitigation (From admission, onward)        Ordered     enoxaparin injection 40 mg  Daily      10/04/18 0857        Guera Liu MD  Department of Hospital Medicine   [facility]

## 2018-10-05 NOTE — PLAN OF CARE
Problem: Patient Care Overview  Goal: Plan of Care Review  Outcome: Ongoing (interventions implemented as appropriate)  Patient AAO, VSS. IVF and abx infusing as ordered. Pain mildly controlled with current pain management plan.  Ice to R knee.  Arnold cath draining clear yellow urine.  NIRMAL drain with sanguineous drainage noted.  No complaints of nausea. Pt verbalized understanding of POC. Q2hr rounding done during shift to promote patient safety. Patient free from falls and injury during shift.  Bed in lowest position, brakes locked, and call light within reach.  Will continue to monitor.

## 2018-10-05 NOTE — PT/OT/SLP PROGRESS
Physical Therapy Treatment    Patient Name:  Carola Blanco   MRN:  [id number]    Recommendations:     Discharge Recommendations:  home health PT   Discharge Equipment Recommendations:   rolling walker (2' pt reported that she can not find the one she had)      Assessment:     Carola Blanco is a 52 y.o. female admitted with a medical diagnosis of S/P total knee arthroplasty, right.  She presents with the following impairments/functional limitations:  weakness, impaired endurance, impaired functional mobilty, gait instability, impaired balance, decreased lower extremity function, pain, decreased ROM, orthopedic precautions .    Rehab Prognosis:  good; patient would benefit from acute skilled PT services to address these deficits and reach maximum level of function.      Recent Surgery: Procedure(s) (LRB):  ARTHROPLASTY, KNEE (Right) 1 Day Post-Op    Plan:     During this hospitalization, patient to be seen BID to address the above listed problems via gait training, therapeutic activities, therapeutic exercises  · Plan of Care Expires:  10/18/18   Plan of Care Reviewed with: patient    Subjective     Communicated with nurse Sima Singh and Melida prior to session.  Patient found supine upon PT entry to room, agreeable to treatment.      Chief Complaint: feeling loopy from meds. Pt also lightheaded/nauseated once sitting EOB. Pt did vomit upon sitting EOB  Patient comments/goals: feel better and recover  Pain/Comfort:  · Pain Rating 1: (did not rate, reported some relief after given pain meds)  · Location - Side 1: Right  · Location 1: knee  · Pain Addressed 1: Pre-medicate for activity, Reposition, Cessation of Activity, Distraction, Nurse notified    Patients cultural, spiritual, Baptist conflicts given the current situation: none    Objective:     Patient found with: peripheral IV(NIRMAL)     General Precautions: Standard, fall   Orthopedic Precautions:RLE weight bearing as tolerated   Braces: Knee immobilizer      Functional Mobility:  · Bed Mobility:     · Scooting: minimum assistance  · Supine to Sit: minimum assistance    · Transfers:     · Sit to Stand:  moderate assistance with rolling walker and cueing for tech    · Gait: 45' with min A using RW. slow pace and antalgic gait noted        Therapeutic Activities and Exercises:   KI donned prior to OOB    pt assisted to EOB with min A. Pt sat EOB with CGA only for balance. Pt expressed feeling lightheaded and nauseated and then vomited into trash can and emesis bag.   pt provided cold,wet towel and nurse Samantha present to administer nausea med.      after seated rest break, pt proceeded to gait in hallway.   pt assisted to bedside chair post gait    Patient left up in chair with all lines intact, call button in reach and nursing notified..    GOALS:   Multidisciplinary Problems     Physical Therapy Goals        Problem: Physical Therapy Goal    Goal Priority Disciplines Outcome Goal Variances Interventions   Physical Therapy Goal     PT, PT/OT Ongoing (interventions implemented as appropriate)                     Time Tracking:     PT Received On: 10/05/18  PT Start Time: 0850     PT Stop Time: 0925  PT Total Time (min): 35 min     Billable Minutes: Gait Training 10 and Therapeutic Activity 23    Treatment Type: Treatment  PT/PTA: PTA     PTA Visit Number: 1     Miroslava Hays, PTA  10/05/2018

## 2018-10-06 ENCOUNTER — HOSPITAL ENCOUNTER (INPATIENT)
Facility: HOSPITAL | Age: 53
LOS: 3 days | Discharge: HOME HEALTH | DRG: 176 | End: 2018-10-09
Attending: EMERGENCY MEDICINE | Admitting: INTERNAL MEDICINE
Payer: COMMERCIAL

## 2018-10-06 VITALS
HEART RATE: 91 BPM | BODY MASS INDEX: 41.65 KG/M2 | OXYGEN SATURATION: 95 % | SYSTOLIC BLOOD PRESSURE: 119 MMHG | HEIGHT: 65 IN | RESPIRATION RATE: 18 BRPM | DIASTOLIC BLOOD PRESSURE: 64 MMHG | WEIGHT: 250 LBS | TEMPERATURE: 97 F

## 2018-10-06 DIAGNOSIS — I26.09 OTHER ACUTE PULMONARY EMBOLISM WITH ACUTE COR PULMONALE: Primary | ICD-10-CM

## 2018-10-06 DIAGNOSIS — I10 HYPERTENSION, UNSPECIFIED TYPE: ICD-10-CM

## 2018-10-06 DIAGNOSIS — I26.99 OTHER PULMONARY EMBOLISM WITHOUT ACUTE COR PULMONALE, UNSPECIFIED CHRONICITY: ICD-10-CM

## 2018-10-06 DIAGNOSIS — Z96.651 S/P TOTAL KNEE ARTHROPLASTY, RIGHT: ICD-10-CM

## 2018-10-06 DIAGNOSIS — R06.00 DYSPNEA, UNSPECIFIED TYPE: ICD-10-CM

## 2018-10-06 DIAGNOSIS — R06.00 DYSPNEA: ICD-10-CM

## 2018-10-06 DIAGNOSIS — M17.11 PRIMARY OSTEOARTHRITIS OF RIGHT KNEE: ICD-10-CM

## 2018-10-06 DIAGNOSIS — Z96.652 S/P TOTAL KNEE ARTHROPLASTY, LEFT: ICD-10-CM

## 2018-10-06 DIAGNOSIS — R06.02 SOB (SHORTNESS OF BREATH): ICD-10-CM

## 2018-10-06 LAB
ALBUMIN SERPL BCP-MCNC: 2.7 G/DL
ALP SERPL-CCNC: 31 U/L
ALT SERPL W/O P-5'-P-CCNC: 26 U/L
ANION GAP SERPL CALC-SCNC: 12 MMOL/L
ANION GAP SERPL CALC-SCNC: 7 MMOL/L
AST SERPL-CCNC: 30 U/L
BASOPHILS # BLD AUTO: 0 K/UL
BASOPHILS # BLD AUTO: 0.1 K/UL
BASOPHILS NFR BLD: 0.3 %
BASOPHILS NFR BLD: 0.4 %
BILIRUB SERPL-MCNC: 0.4 MG/DL
BNP SERPL-MCNC: 32 PG/ML
BUN SERPL-MCNC: 7 MG/DL
BUN SERPL-MCNC: 9 MG/DL
CALCIUM SERPL-MCNC: 8.5 MG/DL
CALCIUM SERPL-MCNC: 8.8 MG/DL
CHLORIDE SERPL-SCNC: 103 MMOL/L
CHLORIDE SERPL-SCNC: 105 MMOL/L
CO2 SERPL-SCNC: 19 MMOL/L
CO2 SERPL-SCNC: 26 MMOL/L
CREAT SERPL-MCNC: 0.9 MG/DL
CREAT SERPL-MCNC: 1.1 MG/DL
DIFFERENTIAL METHOD: ABNORMAL
DIFFERENTIAL METHOD: ABNORMAL
EOSINOPHIL # BLD AUTO: 0 K/UL
EOSINOPHIL # BLD AUTO: 0.4 K/UL
EOSINOPHIL NFR BLD: 0.2 %
EOSINOPHIL NFR BLD: 3.8 %
ERYTHROCYTE [DISTWIDTH] IN BLOOD BY AUTOMATED COUNT: 14.1 %
ERYTHROCYTE [DISTWIDTH] IN BLOOD BY AUTOMATED COUNT: 14.3 %
EST. GFR  (AFRICAN AMERICAN): >60 ML/MIN/1.73 M^2
EST. GFR  (AFRICAN AMERICAN): >60 ML/MIN/1.73 M^2
EST. GFR  (NON AFRICAN AMERICAN): 58 ML/MIN/1.73 M^2
EST. GFR  (NON AFRICAN AMERICAN): >60 ML/MIN/1.73 M^2
GLUCOSE SERPL-MCNC: 103 MG/DL
GLUCOSE SERPL-MCNC: 160 MG/DL
HCT VFR BLD AUTO: 31.3 %
HCT VFR BLD AUTO: 33.3 %
HGB BLD-MCNC: 10.4 G/DL
HGB BLD-MCNC: 11.1 G/DL
LYMPHOCYTES # BLD AUTO: 1.1 K/UL
LYMPHOCYTES # BLD AUTO: 2 K/UL
LYMPHOCYTES NFR BLD: 20.3 %
LYMPHOCYTES NFR BLD: 6.7 %
MCH RBC QN AUTO: 27.3 PG
MCH RBC QN AUTO: 27.4 PG
MCHC RBC AUTO-ENTMCNC: 33.3 G/DL
MCHC RBC AUTO-ENTMCNC: 33.3 G/DL
MCV RBC AUTO: 82 FL
MCV RBC AUTO: 82 FL
MONOCYTES # BLD AUTO: 0.9 K/UL
MONOCYTES # BLD AUTO: 1.1 K/UL
MONOCYTES NFR BLD: 6.8 %
MONOCYTES NFR BLD: 9 %
NEUTROPHILS # BLD AUTO: 13.8 K/UL
NEUTROPHILS # BLD AUTO: 6.7 K/UL
NEUTROPHILS NFR BLD: 66.5 %
NEUTROPHILS NFR BLD: 86 %
PLATELET # BLD AUTO: 215 K/UL
PLATELET # BLD AUTO: 265 K/UL
PMV BLD AUTO: 7.9 FL
PMV BLD AUTO: 8.1 FL
POTASSIUM SERPL-SCNC: 3.8 MMOL/L
POTASSIUM SERPL-SCNC: 3.9 MMOL/L
PROT SERPL-MCNC: 5.8 G/DL
RBC # BLD AUTO: 3.81 M/UL
RBC # BLD AUTO: 4.06 M/UL
SODIUM SERPL-SCNC: 136 MMOL/L
SODIUM SERPL-SCNC: 136 MMOL/L
TROPONIN I SERPL DL<=0.01 NG/ML-MCNC: 0.32 NG/ML
WBC # BLD AUTO: 10 K/UL
WBC # BLD AUTO: 16 K/UL

## 2018-10-06 PROCEDURE — 63600175 PHARM REV CODE 636 W HCPCS: Performed by: HOSPITALIST

## 2018-10-06 PROCEDURE — 93010 ELECTROCARDIOGRAM REPORT: CPT | Mod: ,,, | Performed by: INTERNAL MEDICINE

## 2018-10-06 PROCEDURE — 80053 COMPREHEN METABOLIC PANEL: CPT

## 2018-10-06 PROCEDURE — 25000003 PHARM REV CODE 250: Performed by: PHYSICIAN ASSISTANT

## 2018-10-06 PROCEDURE — 85025 COMPLETE CBC W/AUTO DIFF WBC: CPT | Mod: 91

## 2018-10-06 PROCEDURE — 25000003 PHARM REV CODE 250: Performed by: EMERGENCY MEDICINE

## 2018-10-06 PROCEDURE — 84484 ASSAY OF TROPONIN QUANT: CPT

## 2018-10-06 PROCEDURE — 36415 COLL VENOUS BLD VENIPUNCTURE: CPT

## 2018-10-06 PROCEDURE — 80048 BASIC METABOLIC PNL TOTAL CA: CPT

## 2018-10-06 PROCEDURE — 3E02340 INTRODUCTION OF INFLUENZA VACCINE INTO MUSCLE, PERCUTANEOUS APPROACH: ICD-10-PCS | Performed by: HOSPITALIST

## 2018-10-06 PROCEDURE — 25000003 PHARM REV CODE 250: Performed by: NURSE PRACTITIONER

## 2018-10-06 PROCEDURE — 90686 IIV4 VACC NO PRSV 0.5 ML IM: CPT | Performed by: HOSPITALIST

## 2018-10-06 PROCEDURE — 96360 HYDRATION IV INFUSION INIT: CPT

## 2018-10-06 PROCEDURE — 85025 COMPLETE CBC W/AUTO DIFF WBC: CPT

## 2018-10-06 PROCEDURE — 83880 ASSAY OF NATRIURETIC PEPTIDE: CPT

## 2018-10-06 PROCEDURE — 93005 ELECTROCARDIOGRAM TRACING: CPT

## 2018-10-06 PROCEDURE — 90471 IMMUNIZATION ADMIN: CPT | Performed by: HOSPITALIST

## 2018-10-06 PROCEDURE — 25500020 PHARM REV CODE 255

## 2018-10-06 PROCEDURE — 20000000 HC ICU ROOM

## 2018-10-06 PROCEDURE — 99291 CRITICAL CARE FIRST HOUR: CPT | Mod: 25

## 2018-10-06 RX ORDER — ACETAMINOPHEN 325 MG/1
650 TABLET ORAL EVERY 6 HOURS PRN
Status: DISCONTINUED | OUTPATIENT
Start: 2018-10-06 | End: 2018-10-09 | Stop reason: HOSPADM

## 2018-10-06 RX ORDER — ENOXAPARIN SODIUM 150 MG/ML
1 INJECTION SUBCUTANEOUS
Status: DISCONTINUED | OUTPATIENT
Start: 2018-10-06 | End: 2018-10-08

## 2018-10-06 RX ORDER — OXYCODONE AND ACETAMINOPHEN 5; 325 MG/1; MG/1
1 TABLET ORAL EVERY 6 HOURS PRN
Status: DISCONTINUED | OUTPATIENT
Start: 2018-10-06 | End: 2018-10-07

## 2018-10-06 RX ORDER — ONDANSETRON 2 MG/ML
4 INJECTION INTRAMUSCULAR; INTRAVENOUS EVERY 6 HOURS PRN
Status: DISCONTINUED | OUTPATIENT
Start: 2018-10-06 | End: 2018-10-09 | Stop reason: HOSPADM

## 2018-10-06 RX ADMIN — DOCUSATE SODIUM 100 MG: 100 CAPSULE, LIQUID FILLED ORAL at 09:10

## 2018-10-06 RX ADMIN — FAMOTIDINE 20 MG: 20 TABLET ORAL at 09:10

## 2018-10-06 RX ADMIN — IOHEXOL 100 ML: 350 INJECTION, SOLUTION INTRAVENOUS at 03:10

## 2018-10-06 RX ADMIN — PREGABALIN 75 MG: 75 CAPSULE ORAL at 09:10

## 2018-10-06 RX ADMIN — OXYCODONE HYDROCHLORIDE 10 MG: 10 TABLET ORAL at 06:10

## 2018-10-06 RX ADMIN — SODIUM CHLORIDE 1000 ML: 0.9 INJECTION, SOLUTION INTRAVENOUS at 04:10

## 2018-10-06 RX ADMIN — METHOCARBAMOL 750 MG: 750 TABLET ORAL at 12:10

## 2018-10-06 RX ADMIN — INFLUENZA A VIRUS A/MICHIGAN/45/2015 X-275 (H1N1) ANTIGEN (FORMALDEHYDE INACTIVATED), INFLUENZA A VIRUS A/SINGAPORE/INFIMH-16-0019/2016 IVR-186 (H3N2) ANTIGEN (FORMALDEHYDE INACTIVATED), INFLUENZA B VIRUS B/PHUKET/3073/2013 ANTIGEN (FORMALDEHYDE INACTIVATED), AND INFLUENZA B VIRUS B/MARYLAND/15/2016 BX-69A ANTIGEN (FORMALDEHYDE INACTIVATED) 0.5 ML: 15; 15; 15; 15 INJECTION, SUSPENSION INTRAMUSCULAR at 12:10

## 2018-10-06 RX ADMIN — METRONIDAZOLE: 7.5 GEL TOPICAL at 09:10

## 2018-10-06 RX ADMIN — OXYCODONE HYDROCHLORIDE 10 MG: 10 TABLET ORAL at 10:10

## 2018-10-06 RX ADMIN — OXYCODONE AND ACETAMINOPHEN 1 TABLET: 5; 325 TABLET ORAL at 09:10

## 2018-10-06 RX ADMIN — METHOCARBAMOL 750 MG: 750 TABLET ORAL at 09:10

## 2018-10-06 RX ADMIN — ENOXAPARIN SODIUM 120 MG: 150 INJECTION SUBCUTANEOUS at 07:10

## 2018-10-06 RX ADMIN — MUPIROCIN 1 G: 20 OINTMENT TOPICAL at 09:10

## 2018-10-06 RX ADMIN — SULFAMETHOXAZOLE AND TRIMETHOPRIM 1 TABLET: 400; 80 TABLET ORAL at 09:10

## 2018-10-06 NOTE — PLAN OF CARE
Problem: Patient Care Overview  Goal: Plan of Care Review  Outcome: Revised  Pt is AAOx4.  Pt c/o pain, prn medication given, pt tolerated well.  IV saline locked, no redness or swelling at site.  CPM remains at bedside.  Dressing to hip CDI.  SCD's on.  Neuro checks Q 4 hrs, pulses palpable.  VSS, in NAD, pt remains afebrile.  Pt remains free from injury.  Bed in low position, wheels locked, call light within reach.  Pt verbalized understanding of POC.  Will continue to monitor.

## 2018-10-06 NOTE — PLAN OF CARE
10/05/18 2056   Patient Assessment/Suction   Level of Consciousness (AVPU) alert   Respiratory Effort Unlabored   PRE-TX-O2-ETCO2   O2 Device (Oxygen Therapy) room air   SpO2 96 %   Pulse Oximetry Type Intermittent   $ Pulse Oximetry - Multiple Charge Pulse Oximetry - Multiple   Incentive Spirometer   $ Incentive Spirometer Charges done with encouragement   Administration (Incentive Spirometer) done with encouragement   Number of Repetitions (Incentive Spirometer) 10   Level (mL) (Incentive Spirometer) 1500   Patient Tolerance good

## 2018-10-06 NOTE — HOSPITAL COURSE
She was evaluated post procedure by PT and she was able to ambulate with assistance. She had an uneventful inpatient stay and was discharged on 10/6/18 in a stable condition. She had home health set up at the time of discharge and had an outpatient appointment scheduled with Dr Bang. Plan was discussed with the patient and she was in agreement.

## 2018-10-06 NOTE — PLAN OF CARE
10/06/18 1150   Final Note   Assessment Type Final Discharge Note   Discharge Disposition Home-Health  ([facility])

## 2018-10-06 NOTE — DISCHARGE SUMMARY
[facility] Hospital Medicine  Discharge Summary      Patient Name: Carola Blanco  MRN: [id number]  Admission Date: 10/4/2018  Hospital Length of Stay: 2 days  Discharge Date and Time: 10/6/2018 12:29 PM  Attending Physician: No att. providers found   Discharging Provider: Deniz Samayoa MD  Primary Care Provider: Prashanth Cabrera NP      HPI:   She is a 52 y.o. female  who was admitted to the hospitalist service s/p right total knee arthroplasty performed by Dr. Bang on 10/4. Patient has a past medical history significant for OA and hypertension. Post-operatively, patient denied chest pain, shortness of breath, abdominal pain, nausea, vomiting, headache, vision changes, focal neuro-deficits, cough or fever        Procedure(s) (LRB):  ARTHROPLASTY, KNEE (Right)      Hospital Course:   She was evaluated post procedure by PT and she was able to ambulate with assistance. She had an uneventful inpatient stay and was discharged on 10/6/18 in a stable condition. She had home health set up at the time of discharge and had an outpatient appointment scheduled with Dr Bang. Plan was discussed with the patient and she was in agreement.      Consults:   Consults (From admission, onward)        Status Ordering Provider     Inpatient consult to Social Work/Case Management  Once     Provider:  (Not yet assigned)    Completed NUHA CASTRO     Inpatient consult to Social Work/Case Management  Once     Provider:  (Not yet assigned)    NUHA Thao          No new Assessment & Plan notes have been filed under this hospital service since the last note was generated.  Service: Hospital Medicine    Final Active Diagnoses:    Diagnosis Date Noted POA    PRINCIPAL PROBLEM:  S/P total knee arthroplasty, right [Z96.651] 10/04/2018 Not Applicable    Primary osteoarthritis of right knee [M17.11] 10/04/2018 Yes    Primary osteoarthritis of left knee [M17.12] 04/19/2018 Yes    Hypertension [I10]  04/19/2018 Yes      Problems Resolved During this Admission:       Discharged Condition: fair    Disposition: Home or Self Care    Follow Up:  Follow-up Information     Markie Bang MD On 10/17/2018.    Specialties:  Orthopedic Surgery, General Surgery, Surgery  Why:  @9:45am   Contact information:  [address]  [address]  [place] [address]  [phone]             Prashanth Cabrera NP In 1 week.    Specialty:  Family Medicine  Contact information:  [address]  [place] [address]  [phone]             [facility].    Specialty:  Home Health Services  Contact information:  [address]  [address]  [place] [address]  [phone]                 Patient Instructions:      Ambulatory referral to Home Health   Referral Priority: Routine Referral Type: Home Health   Referral Reason: Specialty Services Required   Requested Specialty: Home Health Services   Number of Visits Requested: 1     Diet Cardiac     Other restrictions (specify):   Order Comments: PLEASE OBSERVE FALL PRECAUTIONS     Call MD for:   Order Comments: For worsening symptoms, chest pain, shortness of breath, increased abdominal pain, high grade fever, stroke or stroke like symptoms, immediately go to the nearest Emergency Room or call 911 as soon as possible.     Notify your health care provider if you experience any of the following:  temperature >100.4     Notify your health care provider if you experience any of the following:  persistent nausea and vomiting or diarrhea     Notify your health care provider if you experience any of the following:  severe uncontrolled pain     Notify your health care provider if you experience any of the following:  redness, tenderness, or signs of infection (pain, swelling, redness, odor or green/yellow discharge around incision site)     Notify your health care provider if you experience any of the following:  difficulty breathing or increased cough     Notify your health care  provider if you experience any of the following:  severe persistent headache     Notify your health care provider if you experience any of the following:  worsening rash     Notify your health care provider if you experience any of the following:  persistent dizziness, light-headedness, or visual disturbances     Notify your health care provider if you experience any of the following:  increased confusion or weakness     Activity as tolerated       Pending Diagnostic Studies:     None         Medications:  Reconciled Home Medications:      Medication List      START taking these medications    aspirin 325 MG tablet  Take 1 tablet (325 mg total) by mouth once daily.     oxyCODONE-acetaminophen  mg per tablet  Commonly known as:  PERCOCET  Take 1 tablet by mouth every 4 (four) hours as needed for Pain.        CHANGE how you take these medications    acetaminophen 325 MG tablet  Commonly known as:  TYLENOL  Take 1 tablet (325 mg total) by mouth 2 (two) times daily.  What changed:    · when to take this  · reasons to take this        CONTINUE taking these medications    diclofenac sodium 1 % Gel  Commonly known as:  VOLTAREN  Apply 4 g topically 4 (four) times daily.     lisinopril 10 MG tablet  Take 1 tablet (10 mg total) by mouth every evening.     metronidazole 1% 1 % Gel  Commonly known as:  METROGEL  Apply topically once daily.     sulfamethoxazole-trimethoprim 400-80mg 400-80 mg per tablet  Commonly known as:  BACTRIM,SEPTRA  Take 1 tablet by mouth every 12 (twelve) hours.     traZODone 50 MG tablet  Commonly known as:  DESYREL  Take 2 tablets (100 mg total) by mouth every evening. 1-2 tablets by mouth daily at bedtime when necessary insomnia     zolpidem 10 mg Tab  Commonly known as:  AMBIEN  Take 5 mg by mouth nightly as needed.        STOP taking these medications    naproxen 500 MG tablet  Commonly known as:  NAPROSYN            Indwelling Lines/Drains at time of discharge:   Lines/Drains/Airways           None        Time spent on the discharge of patient: 30 minutes  Patient was seen and examined on the date of discharge and determined to be suitable for discharge.    Deniz Samayoa MD  Department of Hospital Medicine  [facility]

## 2018-10-06 NOTE — NURSING
Discharge instructions provided, patient verbalized understanding. Printed prescriptions handed directly to patient. PIV removed intact, bleeding controlled, tolerated well. Personal belongings packed per family. Transported off floor via wheelchair to personal vehicle home.

## 2018-10-06 NOTE — HPI
She is a 52 y.o. female who was admitted to the hospitalist service s/p right total knee arthroplasty performed by Dr. Bang on 10/4. Patient has a past medical history significant for OA and hypertension. Post-operatively, patient denied chest pain, shortness of breath, abdominal pain, nausea, vomiting, headache, vision changes, focal neuro-deficits, cough or fever

## 2018-10-06 NOTE — PLAN OF CARE
MEG contacted Claritza stewart/ [facility]  re: referral.  MEG unable to send via [facility].  Sw provided MRN#, .   Will accept and see pt tomorrow.       10/06/18 1142   Discharge Reassessment   Assessment Type Discharge Planning Reassessment

## 2018-10-06 NOTE — PROGRESS NOTES
Patient arrived from the ED via SHERLYN kaba&DEE, oxygen at 2L/NC. Assisted patient into ICU bed, Placed on ICU cardiac monitor. Right knee immobilizer in place and was removed after patient situated in the bed. Patient to get Lovenox injections for treatment of Bilateral PE's. Patient denies shortness of breath, states just a little discomfort when breathing. Patient had just been discharged home today S/P right knee replacement. Noted drainage from where drain had been, knee dressing CDI at this time. Patient has a cell phone, one top, a bra, pants and underwear were cut off and discarded after patient gave consent to do so.

## 2018-10-06 NOTE — SUBJECTIVE & OBJECTIVE
"Principal Problem:S/P total knee arthroplasty, right    Principal Orthopedic Problem: S/P R TKA    Interval History: none    Review of patient's allergies indicates:  No Known Allergies    Current Facility-Administered Medications   Medication    docusate sodium capsule 100 mg    enoxaparin injection 40 mg    famotidine tablet 20 mg    hydromorphone (PF) injection 2 mg    lactated ringers infusion    lisinopril tablet 10 mg    loperamide capsule 2 mg    methocarbamol tablet 750 mg    metroNIDAZOLE 0.75 % gel    mupirocin 2 % ointment 1 g    naloxone 0.4 mg/mL injection 0.02 mg    ondansetron disintegrating tablet 8 mg    ondansetron injection 4 mg    oxyCODONE immediate release tablet 5 mg    oxyCODONE immediate release tablet Tab 10 mg    polyethylene glycol packet 17 g    pregabalin capsule 75 mg    sodium chloride 0.9% flush 5 mL    sulfamethoxazole-trimethoprim 400-80mg per tablet 1 tablet    traZODone tablet 100 mg    zolpidem tablet 5 mg     Objective:     Vital Signs (Most Recent):  Temp: 98.9 °F (37.2 °C) (10/06/18 0741)  Pulse: 77 (10/06/18 0741)  Resp: 16 (10/06/18 0741)  BP: 134/68 (10/06/18 0741)  SpO2: (!) 93 % (10/06/18 0741) Vital Signs (24h Range):  Temp:  [96.2 °F (35.7 °C)-99.7 °F (37.6 °C)] 98.9 °F (37.2 °C)  Pulse:  [74-91] 77  Resp:  [16-20] 16  SpO2:  [93 %-98 %] 93 %  BP: (117-141)/(58-72) 134/68     Weight: 113.4 kg (250 lb)  Height: 5' 5" (165.1 cm)  Body mass index is 41.6 kg/m².      Intake/Output Summary (Last 24 hours) at 10/6/2018 0844  Last data filed at 10/6/2018 0624  Gross per 24 hour   Intake 5630 ml   Output --   Net 5630 ml       General    Nursing note and vitals reviewed.  Constitutional: She is oriented to person, place, and time.   obese   Pulmonary/Chest: Effort normal.   Abdominal: Soft.   Neurological: She is alert and oriented to person, place, and time.   Psychiatric: She has a normal mood and affect. Her behavior is normal.           Right Knee Exam "     Comments:  RLE DNVI. Incision looks great. NIRMAL removed      Significant Labs:   CBC:   Recent Labs   Lab  10/04/18   0912  10/05/18   0610  10/06/18   0513   WBC  9.10  12.30  10.00   HGB  11.8*  11.7*  11.1*   HCT  36.4*  35.7*  33.3*   PLT  333  305  265     CMP:   Recent Labs   Lab  10/04/18   0912  10/05/18   0610  10/06/18   0513   NA  137  136  136   K  4.5  4.0  3.9   CL  105  102  103   CO2  28  26  26   GLU  95  124*  103   BUN  12  7  7   CREATININE  1.0  0.8  0.9   CALCIUM  8.6*  8.9  8.8   ANIONGAP  4*  8  7*   EGFRNONAA  >60  >60  >60     All pertinent labs within the past 24 hours have been reviewed.    Significant Imaging: None

## 2018-10-06 NOTE — PROGRESS NOTES
"[facility]  Orthopedics  Progress Note    Patient Name: Carola Blanco  MRN: [id number]  Admission Date: 10/4/2018  Hospital Length of Stay: 2 days  Attending Provider: Guera Liu MD  Primary Care Provider: Prashanth Cabrera NP  Follow-up For: Procedure(s) (LRB):  ARTHROPLASTY, KNEE (Right)    Post-Operative Day: 2 Days Post-Op  Subjective:     Principal Problem:S/P total knee arthroplasty, right    Principal Orthopedic Problem: S/P R TKA    Interval History: none    Review of patient's allergies indicates:  No Known Allergies    Current Facility-Administered Medications   Medication    docusate sodium capsule 100 mg    enoxaparin injection 40 mg    famotidine tablet 20 mg    hydromorphone (PF) injection 2 mg    lactated ringers infusion    lisinopril tablet 10 mg    loperamide capsule 2 mg    methocarbamol tablet 750 mg    metroNIDAZOLE 0.75 % gel    mupirocin 2 % ointment 1 g    naloxone 0.4 mg/mL injection 0.02 mg    ondansetron disintegrating tablet 8 mg    ondansetron injection 4 mg    oxyCODONE immediate release tablet 5 mg    oxyCODONE immediate release tablet Tab 10 mg    polyethylene glycol packet 17 g    pregabalin capsule 75 mg    sodium chloride 0.9% flush 5 mL    sulfamethoxazole-trimethoprim 400-80mg per tablet 1 tablet    traZODone tablet 100 mg    zolpidem tablet 5 mg     Objective:     Vital Signs (Most Recent):  Temp: 98.9 °F (37.2 °C) (10/06/18 0741)  Pulse: 77 (10/06/18 0741)  Resp: 16 (10/06/18 0741)  BP: 134/68 (10/06/18 0741)  SpO2: (!) 93 % (10/06/18 0741) Vital Signs (24h Range):  Temp:  [96.2 °F (35.7 °C)-99.7 °F (37.6 °C)] 98.9 °F (37.2 °C)  Pulse:  [74-91] 77  Resp:  [16-20] 16  SpO2:  [93 %-98 %] 93 %  BP: (117-141)/(58-72) 134/68     Weight: 113.4 kg (250 lb)  Height: 5' 5" (165.1 cm)  Body mass index is 41.6 kg/m².      Intake/Output Summary (Last 24 hours) at 10/6/2018 0844  Last data filed at 10/6/2018 0624  Gross per 24 hour   Intake 5630 ml "   Output --   Net 5630 ml       General    Nursing note and vitals reviewed.  Constitutional: She is oriented to person, place, and time.   obese   Pulmonary/Chest: Effort normal.   Abdominal: Soft.   Neurological: She is alert and oriented to person, place, and time.   Psychiatric: She has a normal mood and affect. Her behavior is normal.           Right Knee Exam     Comments:  RLE DNVI. Incision looks great. NIRMAL removed      Significant Labs:   CBC:   Recent Labs   Lab  10/04/18   0912  10/05/18   0610  10/06/18   0513   WBC  9.10  12.30  10.00   HGB  11.8*  11.7*  11.1*   HCT  36.4*  35.7*  33.3*   PLT  333  305  265     CMP:   Recent Labs   Lab  10/04/18   0912  10/05/18   0610  10/06/18   0513   NA  137  136  136   K  4.5  4.0  3.9   CL  105  102  103   CO2  28  26  26   GLU  95  124*  103   BUN  12  7  7   CREATININE  1.0  0.8  0.9   CALCIUM  8.6*  8.9  8.8   ANIONGAP  4*  8  7*   EGFRNONAA  >60  >60  >60     All pertinent labs within the past 24 hours have been reviewed.    Significant Imaging: None    Assessment/Plan:     * S/P total knee arthroplasty, right    Stable and should be DC'd home today with PAT DIAZ  Orthopedics  [facility]

## 2018-10-06 NOTE — ED PROVIDER NOTES
"Encounter Date: 10/6/2018    SCRIBE #1 NOTE: Della BLAS, zora scribing for, and in the presence of, Dr. Pito Thompson.       History     Chief Complaint   Patient presents with    Loss of Consciousness       Time seen by provider: 3:03 PM on 10/06/2018    Carola Blanco is a 52 y.o. female with PMHx of HTN who presents to the ED via EMS for evaluation of syncope and weakness that occurred immediately PTA. Patient was discharged from [facility] x2 hours ago s/p a right knee replacement that occurred x2 days ago. Patient has been administered 10mg Percocet every 4 hours since surgery. Per EMS, patient's  stated patient was walking up the stairs when she "lost consciousness" and fell against him. Upon arrival of EMS, patient was difficult to arouse. She was given 1mg Narcan and IV fluids with improvement to patient alertness. EMS states patient's CBG was 219 and patient was in mid 80's O2 saturation on room air en route to ED. Surgery was performed by Dr. Markie Bang. Patient denies chest pain, abdominal pain, and N/V. She only complains of right knee pain. Patient has no other medical concerns or complaints at this moment.       The history is provided by the patient and the EMS personnel.     Review of patient's allergies indicates:  No Known Allergies  Past Medical History:   Diagnosis Date    Arthritis     Hypertension      Past Surgical History:   Procedure Laterality Date    ARTHROPLASTY-KNEE Left 4/19/2018    Performed [facility] at [facility] OR    JOINT REPLACEMENT      KNEE SURGERY Left 04/19/2018     Family History   Problem Relation Age of Onset    Cancer Mother         breast    Arthritis Mother     Birth defects Mother     Cancer Father         lung    Arthritis Maternal Grandmother      Social History     Tobacco Use    Smoking status: Never Smoker    Smokeless tobacco: Never Used   Substance Use Topics    Alcohol use: No    Drug use: No     Review of Systems "   Constitutional: Negative for activity change, appetite change, chills, fatigue and fever.   HENT: Negative for congestion, rhinorrhea and sore throat.    Respiratory: Negative for apnea and shortness of breath.    Cardiovascular: Negative for chest pain and palpitations.   Gastrointestinal: Negative for abdominal distention, abdominal pain, nausea and vomiting.   Genitourinary: Negative for difficulty urinating.   Musculoskeletal: Positive for arthralgias (right knee). Negative for neck pain.   Skin: Negative for pallor and rash.   Neurological: Positive for syncope and weakness. Negative for headaches.   Hematological: Does not bruise/bleed easily.   Psychiatric/Behavioral: Negative for agitation.       Physical Exam     Initial Vitals [10/06/18 1506]   BP Pulse Resp Temp SpO2   (!) 98/53 (!) 112 16 97.2 °F (36.2 °C) (!) 94 %      MAP       --         Physical Exam    Nursing note and vitals reviewed.  Constitutional: She appears well-developed and well-nourished. She is not diaphoretic. No distress.   HENT:   Head: Normocephalic and atraumatic.   Eyes: EOM are normal. Pupils are equal, round, and reactive to light.   Neck: Normal range of motion. Neck supple.   Cardiovascular: Regular rhythm, normal heart sounds and intact distal pulses. Tachycardia present.  Exam reveals no gallop and no friction rub.    No murmur heard.  Pulmonary/Chest: Breath sounds normal. No respiratory distress. She has no wheezes. She has no rhonchi. She has no rales.   Abdominal: Soft. Bowel sounds are normal. There is no tenderness. There is no rebound and no guarding.   Musculoskeletal: Normal range of motion.   RLE brace intact.    Neurological: She is alert and oriented to person, place, and time.   Generalized weakness.    Skin: Skin is warm and dry.   Psychiatric: She has a normal mood and affect. Her behavior is normal. Judgment and thought content normal.         ED Course   Critical Care  Performed by: Pito Thompson  MD  Authorized by: Pito Thompson MD   Direct patient critical care time: 16 minutes  Additional history critical care time: 9 minutes  Ordering / reviewing critical care time: 10 minutes  Documentation critical care time: 11 minutes  Consulting other physicians critical care time: 7 minutes  Total critical care time (exclusive of procedural time) : 53 minutes  Critical care was time spent personally by me on the following activities: ordering and review of radiographic studies, obtaining history from patient or surrogate, ordering and review of laboratory studies and examination of patient.        Labs Reviewed   COMPREHENSIVE METABOLIC PANEL - Abnormal; Notable for the following components:       Result Value    CO2 19 (*)     Glucose 160 (*)     Calcium 8.5 (*)     Total Protein 5.8 (*)     Albumin 2.7 (*)     Alkaline Phosphatase 31 (*)     eGFR if non  58 (*)     All other components within normal limits   CBC W/ AUTO DIFFERENTIAL - Abnormal; Notable for the following components:    WBC 16.00 (*)     RBC 3.81 (*)     Hemoglobin 10.4 (*)     Hematocrit 31.3 (*)     MPV 7.9 (*)     Gran # (ANC) 13.8 (*)     Mono # 1.1 (*)     Gran% 86.0 (*)     Lymph% 6.7 (*)     All other components within normal limits   TROPONIN I - Abnormal; Notable for the following components:    Troponin I 0.316 (*)     All other components within normal limits   B-TYPE NATRIURETIC PEPTIDE     EKG Readings: (Independently Interpreted)   Rhythm: Sinus Tachycardia. Heart Rate: 110 BPM. Axis: Normal.   Low voltage QRS throughout. No acute ST segment changes.        Imaging Results          X-Ray Chest 1 View (Final result)  Result time 10/06/18 16:37:07    Final result by Albaro Camargo MD (10/06/18 16:37:07)                 Impression:      No new airspace disease.      Electronically signed by: Albaro Caamrgo  Date:    10/06/2018  Time:    16:37             Narrative:    EXAMINATION:  XR CHEST 1 VIEW    CLINICAL  HISTORY:  Shortness of breath    TECHNIQUE:  Single frontal view of the chest was performed.    COMPARISON:  09/24/2018    FINDINGS:  No airspace disease.  Similar mild cardiomegaly.  Normal pulmonary vascular distribution.  No pleural effusion or pneumothorax.  No acute osseous abnormality.                               CTA Chest Non-Coronary - PE Study (Final result)     Abnormal  Result time 10/06/18 16:45:57    Final result by Albaro Camargo MD (10/06/18 16:45:57)                 Impression:      1. Bilateral pulmonary emboli with moderate clot burden and evidence of right heart strain.  2. Mosaic attenuation in the lungs suggestive of mild pulmonary edema.  3. Cardiomegaly.  4. Left lung pulmonary nodule.  In a low risk patient no further follow-up.  In a high-risk patient consider 12 month follow-up CT.  This report was flagged in Epic as abnormal.    Findings discussed with Dr Thompson by telephone at 16:44 pm.      Electronically signed by: Albaro Camargo  Date:    10/06/2018  Time:    16:45             Narrative:    EXAMINATION:  CTA CHEST NON CORONARY    CLINICAL HISTORY:  Dyspnea, cardiac origin suspected;    TECHNIQUE:  Low dose axial images, sagittal and coronal reformations were obtained from the thoracic inlet to the lung bases following the IV administration of 100 mL of Omnipaque 350.  Contrast timing was optimized to evaluate the pulmonary arteries.  MIP images were performed.    COMPARISON:  None    FINDINGS:  Faint mosaic attenuation in the lungs, perihilar distribution.  Respiratory motion artifact in the lungs limits fine detail.  Mild atelectasis in both lungs dependently.  4 mm pulmonary nodule left upper lobe series 2, image 89.    Mild cardiomegaly.  Flattening of the interventricular septum.  Normal size of the pulmonary arterial trunk.  Right-sided pulmonary embolus at the level of the distal right main extending into all lobar branches on the right.  Left-sided pulmonary embolus at  the level of the proximal segmental arteries involving multiple branches in the left upper and left lower lobe.  Some of the filling defect within the right middle lobe and both lower lobes are near occlusive.    No mediastinal or hilar adenopathy.  Focal areas of cortical thinning along the superior right kidney suggestive of remote injury.  Fatty atrophy pancreas head.  At least mild degenerative change of both glenohumeral joints and moderate degenerative change of both AC joint.                                 Medical Decision Making:   History:   Old Medical Records: I decided to obtain old medical records.  Initial Assessment:   52-year-old female who presented after a syncopal episode.  Differential Diagnosis:   Initial differential diagnosis includes but is not limited to: cardiac arrhythmia, dehydration, pulmonary embolism, and medication reaction.   Clinical Tests:   Lab Tests: Reviewed and Ordered  Radiological Study: Reviewed and Ordered  Medical Tests: Reviewed and Ordered  ED Management:  The patient was emergently evaluated in the emergency department, her evaluation was significant for a middle-age female who is noted to be tachycardic.  The patient's EKG was significant for sinus tachycardia per my independent interpretation.  The patient is chest x-ray showed no acute abnormalities.  The patient's labs were concerning for elevated troponin.  The patient's CT scan of her chest showed significant pulmonary emboli bilaterally with right heart strain, as reported to me by doctor Camargo the radiologist.  I will admit the patient to the hospitalist service for further care.  I have discussed the case with the hospitalist on call, Dr. Samayoa, who did examine the patient in the emergency department.  As I have also placed a phone call to the patient's orthopedic physician, Dr. Bang to discuss anticoagulation in this patient.  Other:   I have discussed this case with another health care provider.             Scribe Attestation:   Scribe #1: I performed the above scribed service and the documentation accurately describes the services I performed. I attest to the accuracy of the note.    Attending Attestation:             Attending ED Notes:   6:00 p.m.   Dr. Samayoa reports that he was able to get in touch with Dr. Bang's PA, and he will start full-dose Lovenox on this patient.  She is to be admitted to the ICU.  I, Dr. Pito Thompson, personally performed the services described in this documentation. All medical record entries made by the scribe were at my direction and in my presence.  I have reviewed the chart and agree that the record reflects my personal performance and is accurate and complete. Pito Thompson MD.  5:58 PM 10/06/2018             Clinical Impression:   The primary encounter diagnosis was Other acute pulmonary embolism with acute cor pulmonale. A diagnosis of SOB (shortness of breath) was also pertinent to this visit.      Disposition:   Disposition: Admitted                        Pito Thompson MD  10/06/18 1480

## 2018-10-07 LAB
ANION GAP SERPL CALC-SCNC: 10 MMOL/L
BASOPHILS # BLD AUTO: 0 K/UL
BASOPHILS NFR BLD: 0.3 %
BUN SERPL-MCNC: 12 MG/DL
CALCIUM SERPL-MCNC: 9.3 MG/DL
CHLORIDE SERPL-SCNC: 105 MMOL/L
CO2 SERPL-SCNC: 26 MMOL/L
CREAT SERPL-MCNC: 1 MG/DL
DIFFERENTIAL METHOD: ABNORMAL
EOSINOPHIL # BLD AUTO: 0.2 K/UL
EOSINOPHIL NFR BLD: 1.9 %
ERYTHROCYTE [DISTWIDTH] IN BLOOD BY AUTOMATED COUNT: 14.4 %
EST. GFR  (AFRICAN AMERICAN): >60 ML/MIN/1.73 M^2
EST. GFR  (NON AFRICAN AMERICAN): >60 ML/MIN/1.73 M^2
GLUCOSE SERPL-MCNC: 93 MG/DL
HCT VFR BLD AUTO: 32.7 %
HGB BLD-MCNC: 10.8 G/DL
LYMPHOCYTES # BLD AUTO: 2.6 K/UL
LYMPHOCYTES NFR BLD: 26.4 %
MCH RBC QN AUTO: 27.1 PG
MCHC RBC AUTO-ENTMCNC: 33.1 G/DL
MCV RBC AUTO: 82 FL
MONOCYTES # BLD AUTO: 0.8 K/UL
MONOCYTES NFR BLD: 8.4 %
NEUTROPHILS # BLD AUTO: 6.2 K/UL
NEUTROPHILS NFR BLD: 63 %
PLATELET # BLD AUTO: 250 K/UL
PMV BLD AUTO: 8.1 FL
POTASSIUM SERPL-SCNC: 4.1 MMOL/L
RBC # BLD AUTO: 3.99 M/UL
SODIUM SERPL-SCNC: 141 MMOL/L
WBC # BLD AUTO: 9.8 K/UL

## 2018-10-07 PROCEDURE — 25000003 PHARM REV CODE 250: Performed by: HOSPITALIST

## 2018-10-07 PROCEDURE — 63600175 PHARM REV CODE 636 W HCPCS: Performed by: HOSPITALIST

## 2018-10-07 PROCEDURE — 85303 CLOT INHIBIT PROT C ACTIVITY: CPT

## 2018-10-07 PROCEDURE — 36415 COLL VENOUS BLD VENIPUNCTURE: CPT

## 2018-10-07 PROCEDURE — 94761 N-INVAS EAR/PLS OXIMETRY MLT: CPT

## 2018-10-07 PROCEDURE — 80048 BASIC METABOLIC PNL TOTAL CA: CPT

## 2018-10-07 PROCEDURE — 27000221 HC OXYGEN, UP TO 24 HOURS

## 2018-10-07 PROCEDURE — 85300 ANTITHROMBIN III ACTIVITY: CPT

## 2018-10-07 PROCEDURE — 25500020 PHARM REV CODE 255: Performed by: SPECIALIST

## 2018-10-07 PROCEDURE — 25000003 PHARM REV CODE 250: Performed by: NURSE PRACTITIONER

## 2018-10-07 PROCEDURE — 85305 CLOT INHIBIT PROT S TOTAL: CPT

## 2018-10-07 PROCEDURE — 85025 COMPLETE CBC W/AUTO DIFF WBC: CPT

## 2018-10-07 PROCEDURE — 81241 F5 GENE: CPT

## 2018-10-07 PROCEDURE — 11000001 HC ACUTE MED/SURG PRIVATE ROOM

## 2018-10-07 RX ORDER — OXYCODONE AND ACETAMINOPHEN 10; 325 MG/1; MG/1
1 TABLET ORAL EVERY 4 HOURS PRN
Status: DISCONTINUED | OUTPATIENT
Start: 2018-10-07 | End: 2018-10-09 | Stop reason: HOSPADM

## 2018-10-07 RX ADMIN — ENOXAPARIN SODIUM 120 MG: 150 INJECTION SUBCUTANEOUS at 06:10

## 2018-10-07 RX ADMIN — SULFUR HEXAFLUORIDE 2.4 ML: KIT at 11:10

## 2018-10-07 RX ADMIN — ENOXAPARIN SODIUM 120 MG: 150 INJECTION SUBCUTANEOUS at 07:10

## 2018-10-07 RX ADMIN — OXYCODONE AND ACETAMINOPHEN 1 TABLET: 5; 325 TABLET ORAL at 07:10

## 2018-10-07 RX ADMIN — OXYCODONE AND ACETAMINOPHEN 1 TABLET: 10; 325 TABLET ORAL at 08:10

## 2018-10-07 RX ADMIN — OXYCODONE AND ACETAMINOPHEN 1 TABLET: 10; 325 TABLET ORAL at 04:10

## 2018-10-07 RX ADMIN — OXYCODONE AND ACETAMINOPHEN 1 TABLET: 10; 325 TABLET ORAL at 11:10

## 2018-10-07 NOTE — PLAN OF CARE
Problem: Patient Care Overview  Goal: Plan of Care Review  Outcome: Ongoing (interventions implemented as appropriate)  VSS. Remains afebrile. Pt is on 2L-NC. Breathing has improved throughout shift. Safety maintained entire shift.

## 2018-10-07 NOTE — ASSESSMENT & PLAN NOTE
- Bilateral PE with moderate clot burden.  - The patient was started on 1mg/kg of s/c Enoxaparin BID after discussion with Orthopedics.  - Of note the patient was on DVT prophylaxis on the previous admission.  - Consider transition to Warfarin/NOAC in the next 24-48 hours.  - Close monitoring in the ICU.

## 2018-10-07 NOTE — NURSING TRANSFER
Nursing Transfer Note      10/7/2018     Transfer To: Room 221    Transfer via bed    Transfer with 2L to O2, cardiac monitoring    Transported by Shelby Fowler RN     Medicines sent: yes    Chart send with patient: Yes    Notified: spouse    Patient reassessed at: 10/07/2018 @ 1720 (date, time)    Upon arrival to floor: cardiac monitor applied, patient oriented to room, call bell in reach and bed in lowest position

## 2018-10-07 NOTE — SUBJECTIVE & OBJECTIVE
Interval History: Patient denied any acute complaints. Pain is well controlled. Reports a non productive cough.    Review of Systems   Constitutional: Negative for appetite change.   HENT: Negative for congestion and dental problem.    Eyes: Negative for discharge.   Respiratory: Positive for cough and shortness of breath.    Cardiovascular: Negative for chest pain.   Musculoskeletal: Negative for arthralgias and back pain.   Skin: Negative for color change and pallor.   Allergic/Immunologic: Negative for environmental allergies.   Neurological: Positive for syncope. Negative for dizziness.   Psychiatric/Behavioral: Negative for agitation.     Objective:     Vital Signs (Most Recent):  Temp: 98.6 °F (37 °C) (10/07/18 0330)  Pulse: 89 (10/07/18 0700)  Resp: 19 (10/07/18 0700)  BP: 114/70 (10/07/18 0700)  SpO2: 97 % (10/07/18 0700) Vital Signs (24h Range):  Temp:  [97.1 °F (36.2 °C)-100.2 °F (37.9 °C)] 98.6 °F (37 °C)  Pulse:  [] 89  Resp:  [13-31] 19  SpO2:  [93 %-100 %] 97 %  BP: ()/(52-78) 114/70     Weight: 114.8 kg (253 lb 1.4 oz)  Body mass index is 42.12 kg/m².    Intake/Output Summary (Last 24 hours) at 10/7/2018 0846  Last data filed at 10/7/2018 0130  Gross per 24 hour   Intake 240 ml   Output 950 ml   Net -710 ml      Physical Exam   Constitutional: She is oriented to person, place, and time. She appears well-developed and well-nourished. No distress.   HENT:   Head: Normocephalic and atraumatic.   Eyes: EOM are normal. Pupils are equal, round, and reactive to light. No scleral icterus.   Neck: Normal range of motion. Neck supple.   Cardiovascular: Normal rate, regular rhythm and normal heart sounds. Exam reveals no gallop and no friction rub.   No murmur heard.  Pulmonary/Chest: Effort normal and breath sounds normal. No stridor. No respiratory distress. She has no wheezes.   Abdominal: Soft.   Musculoskeletal: She exhibits edema.   Right knee area is bandaged.   Neurological: She is alert and  oriented to person, place, and time.   Skin: Skin is warm and dry. She is not diaphoretic.   Psychiatric: She has a normal mood and affect.     Significant Labs:   A1C: No results for input(s): HGBA1C in the last 4320 hours.  ABGs: No results for input(s): PH, PCO2, HCO3, POCSATURATED, BE, TOTALHB, COHB, METHB, O2HB, POCFIO2 in the last 48 hours.  Bilirubin:   Recent Labs   Lab  10/06/18   1600   BILITOT  0.4     Blood Culture: No results for input(s): LABBLOO in the last 48 hours.  BMP:   Recent Labs   Lab  10/07/18   0614   GLU  93   NA  141   K  4.1   CL  105   CO2  26   BUN  12   CREATININE  1.0   CALCIUM  9.3     CBC:   Recent Labs   Lab  10/06/18   0513  10/06/18   1600  10/07/18   0614   WBC  10.00  16.00*  9.80   HGB  11.1*  10.4*  10.8*   HCT  33.3*  31.3*  32.7*   PLT  265  215  250     CMP:   Recent Labs   Lab  10/06/18   0513  10/06/18   1600  10/07/18   0614   NA  136  136  141   K  3.9  3.8  4.1   CL  103  105  105   CO2  26  19*  26   GLU  103  160*  93   BUN  7  9  12   CREATININE  0.9  1.1  1.0   CALCIUM  8.8  8.5*  9.3   PROT   --   5.8*   --    ALBUMIN   --   2.7*   --    BILITOT   --   0.4   --    ALKPHOS   --   31*   --    AST   --   30   --    ALT   --   26   --    ANIONGAP  7*  12  10   EGFRNONAA  >60  58*  >60     Cardiac Markers:   Recent Labs   Lab  10/06/18   1600   BNP  32     Coagulation: No results for input(s): PT, INR, APTT in the last 48 hours.  Lactic Acid: No results for input(s): LACTATE in the last 48 hours.  Lipase: No results for input(s): LIPASE in the last 48 hours.  Lipid Panel: No results for input(s): CHOL, HDL, LDLCALC, TRIG, CHOLHDL in the last 48 hours.  Magnesium: No results for input(s): MG in the last 48 hours.  Pathology Results  (Last 10 years)    None        POCT Glucose: No results for input(s): POCTGLUCOSE in the last 48 hours.  Prealbumin: No results for input(s): PREALBUMIN in the last 48 hours.  Respiratory Culture: No results for input(s): GSRESP,  RESPIRATORYC in the last 48 hours.  Troponin:   Recent Labs   Lab  10/06/18   1600   TROPONINI  0.316*     TSH: No results for input(s): TSH in the last 4320 hours.    Significant Imaging: I have reviewed and interpreted all pertinent imaging results/findings within the past 24 hours.

## 2018-10-07 NOTE — H&P
[facility] Medicine  History & Physical    Patient Name: Carola Blanco  MRN: [id number]  Admission Date: 10/6/2018  Attending Physician: Guera Liu MD   Primary Care Provider: Prashanth Cabrera NP    Patient information was obtained from patient, relative(s) and ER records.     Subjective:     Principal Problem:<principal problem not specified>    Chief Complaint:   Chief Complaint   Patient presents with    Loss of Consciousness        HPI: Carola Blanco is a 52 y.o. female with PMHx of HTN who presented to the ED via EMS for evaluation of syncope and weakness that occurred immediately prior to arrival. Patient was discharged from [facility] earlier today s/p a right knee replacement that occurred x2 days ago. Per EMS, patient's  stated that she was walking up the stairs when she lost consciousness and fell against him. Upon arrival of EMS, patient was difficult to arouse. She was given 1mg Narcan and IV fluids with improvement in mental status EMS states patient's CBG was 219 and patient was in mid 80's O2 saturation on room air en route to ED.  Patient denies chest pain, abdominal pain, and N/V. In the ED she had a CT angiogram that showed bilateral PEs. After discussing the case with Ortho she was started on 1mg/kg of Enoxaparin.        Past Medical History:   Diagnosis Date    Arthritis     Hypertension        Past Surgical History:   Procedure Laterality Date    ARTHROPLASTY-KNEE Left 4/19/2018    Performed by [facility] at [facility]    JOINT REPLACEMENT      KNEE SURGERY Left 04/19/2018       Review of patient's allergies indicates:  No Known Allergies    Current Facility-Administered Medications on File Prior to Encounter   Medication    [COMPLETED] influenza (FLUZONE QUADRIVALENT) vaccine 0.5 mL    [DISCONTINUED] docusate sodium capsule 100 mg    [DISCONTINUED] enoxaparin injection 40 mg    [DISCONTINUED] famotidine tablet 20 mg    [DISCONTINUED]  hydromorphone (PF) injection 2 mg    [DISCONTINUED] lactated ringers infusion    [DISCONTINUED] lisinopril tablet 10 mg    [DISCONTINUED] loperamide capsule 2 mg    [DISCONTINUED] methocarbamol tablet 750 mg    [DISCONTINUED] metroNIDAZOLE 0.75 % gel    [DISCONTINUED] mupirocin 2 % ointment 1 g    [DISCONTINUED] naloxone 0.4 mg/mL injection 0.02 mg    [DISCONTINUED] ondansetron disintegrating tablet 8 mg    [DISCONTINUED] ondansetron injection 4 mg    [DISCONTINUED] oxyCODONE immediate release tablet 5 mg    [DISCONTINUED] oxyCODONE immediate release tablet Tab 10 mg    [DISCONTINUED] polyethylene glycol packet 17 g    [DISCONTINUED] pregabalin capsule 75 mg    [DISCONTINUED] sodium chloride 0.9% flush 5 mL    [DISCONTINUED] sulfamethoxazole-trimethoprim 400-80mg per tablet 1 tablet    [DISCONTINUED] traZODone tablet 100 mg    [DISCONTINUED] zolpidem tablet 5 mg     Current Outpatient Medications on File Prior to Encounter   Medication Sig    acetaminophen (TYLENOL) 325 MG tablet Take 1 tablet (325 mg total) by mouth 2 (two) times daily.    aspirin 325 MG tablet Take 1 tablet (325 mg total) by mouth once daily.    diclofenac sodium (VOLTAREN) 1 % Gel Apply 4 g topically 4 (four) times daily.    lisinopril 10 MG tablet Take 1 tablet (10 mg total) by mouth every evening.    metronidazole 1% (METROGEL) 1 % Gel Apply topically once daily.    oxyCODONE-acetaminophen (PERCOCET)  mg per tablet Take 1 tablet by mouth every 4 (four) hours as needed for Pain.    sulfamethoxazole-trimethoprim 400-80mg (BACTRIM,SEPTRA) 400-80 mg per tablet Take 1 tablet by mouth every 12 (twelve) hours.    traZODone (DESYREL) 50 MG tablet Take 2 tablets (100 mg total) by mouth every evening. 1-2 tablets by mouth daily at bedtime when necessary insomnia    zolpidem (AMBIEN) 10 mg Tab Take 5 mg by mouth nightly as needed.      Family History     Problem Relation (Age of Onset)    Arthritis Mother, Maternal  Grandmother    Birth defects Mother    Cancer Mother, Father        Tobacco Use    Smoking status: Never Smoker    Smokeless tobacco: Never Used   Substance and Sexual Activity    Alcohol use: No    Drug use: No    Sexual activity: Yes     Birth control/protection: Injection     Review of Systems   Constitutional: Negative for appetite change.   HENT: Negative for congestion and dental problem.    Eyes: Negative for discharge.   Respiratory: Positive for cough and shortness of breath.    Cardiovascular: Negative for chest pain.   Musculoskeletal: Negative for arthralgias and back pain.   Skin: Negative for color change and pallor.   Allergic/Immunologic: Negative for environmental allergies.   Neurological: Positive for syncope. Negative for dizziness.   Psychiatric/Behavioral: Negative for agitation.     Objective:     Vital Signs (Most Recent):  Temp: 98.4 °F (36.9 °C) (10/06/18 1930)  Pulse: (!) 116 (10/06/18 2000)  Resp: (!) 26 (10/06/18 2000)  BP: (!) 99/52 (10/06/18 2000)  SpO2: 96 % (10/06/18 2000) Vital Signs (24h Range):  Temp:  [97.1 °F (36.2 °C)-100.2 °F (37.9 °C)] 98.4 °F (36.9 °C)  Pulse:  [] 116  Resp:  [16-31] 26  SpO2:  [93 %-100 %] 96 %  BP: ()/(52-78) 99/52     Weight: 114.8 kg (253 lb 1.4 oz)  Body mass index is 42.12 kg/m².    Physical Exam   Constitutional: She is oriented to person, place, and time. She appears well-developed and well-nourished. No distress.   HENT:   Head: Normocephalic and atraumatic.   Eyes: EOM are normal. Pupils are equal, round, and reactive to light. No scleral icterus.   Neck: Normal range of motion. Neck supple.   Cardiovascular: Normal rate, regular rhythm and normal heart sounds. Exam reveals no gallop and no friction rub.   No murmur heard.  Pulmonary/Chest: Effort normal and breath sounds normal. No stridor. No respiratory distress. She has no wheezes.   Abdominal: Soft.   Musculoskeletal: She exhibits edema.   Right knee area is bandaged.    Neurological: She is alert and oriented to person, place, and time.   Skin: Skin is warm and dry. She is not diaphoretic.   Psychiatric: She has a normal mood and affect.         CRANIAL NERVES     CN III, IV, VI   Pupils are equal, round, and reactive to light.  Extraocular motions are normal.     Significant Labs:   A1C: No results for input(s): HGBA1C in the last 4320 hours.  ABGs: No results for input(s): PH, PCO2, HCO3, POCSATURATED, BE, TOTALHB, COHB, METHB, O2HB, POCFIO2 in the last 48 hours.  Bilirubin:   Recent Labs   Lab  10/06/18   1600   BILITOT  0.4     Blood Culture: No results for input(s): LABBLOO in the last 48 hours.  BMP:   Recent Labs   Lab  10/06/18   1600   GLU  160*   NA  136   K  3.8   CL  105   CO2  19*   BUN  9   CREATININE  1.1   CALCIUM  8.5*     CBC:   Recent Labs   Lab  10/05/18   0610  10/06/18   0513  10/06/18   1600   WBC  12.30  10.00  16.00*   HGB  11.7*  11.1*  10.4*   HCT  35.7*  33.3*  31.3*   PLT  305  265  215     CMP:   Recent Labs   Lab  10/05/18   0610  10/06/18   0513  10/06/18   1600   NA  136  136  136   K  4.0  3.9  3.8   CL  102  103  105   CO2  26  26  19*   GLU  124*  103  160*   BUN  7  7  9   CREATININE  0.8  0.9  1.1   CALCIUM  8.9  8.8  8.5*   PROT   --    --   5.8*   ALBUMIN   --    --   2.7*   BILITOT   --    --   0.4   ALKPHOS   --    --   31*   AST   --    --   30   ALT   --    --   26   ANIONGAP  8  7*  12   EGFRNONAA  >60  >60  58*     Cardiac Markers:   Recent Labs   Lab  10/06/18   1600   BNP  32     Coagulation: No results for input(s): PT, INR, APTT in the last 48 hours.  Lactic Acid: No results for input(s): LACTATE in the last 48 hours.  Lipase: No results for input(s): LIPASE in the last 48 hours.  Lipid Panel: No results for input(s): CHOL, HDL, LDLCALC, TRIG, CHOLHDL in the last 48 hours.  Magnesium: No results for input(s): MG in the last 48 hours.  Pathology Results  (Last 10 years)    None        POCT Glucose: No results for input(s):  POCTGLUCOSE in the last 48 hours.  Prealbumin: No results for input(s): PREALBUMIN in the last 48 hours.  Respiratory Culture: No results for input(s): GSRESP, RESPIRATORYC in the last 48 hours.  Troponin:   Recent Labs   Lab  10/06/18   1600   TROPONINI  0.316*     Significant Imaging: I have reviewed and interpreted all pertinent imaging results/findings within the past 24 hours.    Assessment/Plan:     Pulmonary embolus    - Bilateral PE with moderate clot burden.  - The patient was started on 1mg/kg of s/c Enoxaparin BID after discussion with Orthopedics.  - Of note the patient was on DVT prophylaxis on the previous admission.  - Consider transition to Warfarin/NOAC in the next 24-48 hours.  - Close monitoring in the ICU.          Primary osteoarthritis of right knee    - s/p R TKR on 10/6  - PT/OT evaluation.          Hypertension    - Hold antihypertensives for now.            VTE Risk Mitigation (From admission, onward)        Ordered     enoxaparin injection 120 mg  Every 12 hours (non-standard times)      10/06/18 1810        Critical care time spent on the evaluation and treatment of severe organ dysfunction, review of pertinent labs and imaging studies, discussions with consulting providers and discussions with patient/family: 40 minutes.     Deniz Samayoa MD  Department of Hospital Medicine   [facility]

## 2018-10-07 NOTE — PLAN OF CARE
10/07/18 0805   Patient Assessment/Suction   Level of Consciousness (AVPU) alert   All Lung Fields Breath Sounds diminished   PRE-TX-O2-ETCO2   O2 Device (Oxygen Therapy) nasal cannula   $ Is the patient on Low Flow Oxygen? Yes   Flow (L/min) 2   Oxygen Concentration (%) 228   Pulse Oximetry Type Continuous   $ Pulse Oximetry - Multiple Charge Pulse Oximetry - Multiple   Ready to Wean/Extubation Screen   FIO2<=50 (chart decimal) (!) 2.28

## 2018-10-07 NOTE — SUBJECTIVE & OBJECTIVE
Past Medical History:   Diagnosis Date    Arthritis     Hypertension        Past Surgical History:   Procedure Laterality Date    ARTHROPLASTY-KNEE Left 4/19/2018    Performed by [facility] at [facility] OR    JOINT REPLACEMENT      KNEE SURGERY Left 04/19/2018       Review of patient's allergies indicates:  No Known Allergies    Current Facility-Administered Medications on File Prior to Encounter   Medication    [COMPLETED] influenza (FLUZONE QUADRIVALENT) vaccine 0.5 mL    [DISCONTINUED] docusate sodium capsule 100 mg    [DISCONTINUED] enoxaparin injection 40 mg    [DISCONTINUED] famotidine tablet 20 mg    [DISCONTINUED] hydromorphone (PF) injection 2 mg    [DISCONTINUED] lactated ringers infusion    [DISCONTINUED] lisinopril tablet 10 mg    [DISCONTINUED] loperamide capsule 2 mg    [DISCONTINUED] methocarbamol tablet 750 mg    [DISCONTINUED] metroNIDAZOLE 0.75 % gel    [DISCONTINUED] mupirocin 2 % ointment 1 g    [DISCONTINUED] naloxone 0.4 mg/mL injection 0.02 mg    [DISCONTINUED] ondansetron disintegrating tablet 8 mg    [DISCONTINUED] ondansetron injection 4 mg    [DISCONTINUED] oxyCODONE immediate release tablet 5 mg    [DISCONTINUED] oxyCODONE immediate release tablet Tab 10 mg    [DISCONTINUED] polyethylene glycol packet 17 g    [DISCONTINUED] pregabalin capsule 75 mg    [DISCONTINUED] sodium chloride 0.9% flush 5 mL    [DISCONTINUED] sulfamethoxazole-trimethoprim 400-80mg per tablet 1 tablet    [DISCONTINUED] traZODone tablet 100 mg    [DISCONTINUED] zolpidem tablet 5 mg     Current Outpatient Medications on File Prior to Encounter   Medication Sig    acetaminophen (TYLENOL) 325 MG tablet Take 1 tablet (325 mg total) by mouth 2 (two) times daily.    aspirin 325 MG tablet Take 1 tablet (325 mg total) by mouth once daily.    diclofenac sodium (VOLTAREN) 1 % Gel Apply 4 g topically 4 (four) times daily.    lisinopril 10 MG tablet Take 1 tablet (10 mg total) by mouth every  evening.    metronidazole 1% (METROGEL) 1 % Gel Apply topically once daily.    oxyCODONE-acetaminophen (PERCOCET)  mg per tablet Take 1 tablet by mouth every 4 (four) hours as needed for Pain.    sulfamethoxazole-trimethoprim 400-80mg (BACTRIM,SEPTRA) 400-80 mg per tablet Take 1 tablet by mouth every 12 (twelve) hours.    traZODone (DESYREL) 50 MG tablet Take 2 tablets (100 mg total) by mouth every evening. 1-2 tablets by mouth daily at bedtime when necessary insomnia    zolpidem (AMBIEN) 10 mg Tab Take 5 mg by mouth nightly as needed.      Family History     Problem Relation (Age of Onset)    Arthritis Mother, Maternal Grandmother    Birth defects Mother    Cancer Mother, Father        Tobacco Use    Smoking status: Never Smoker    Smokeless tobacco: Never Used   Substance and Sexual Activity    Alcohol use: No    Drug use: No    Sexual activity: Yes     Birth control/protection: Injection     Review of Systems   Constitutional: Negative for appetite change.   HENT: Negative for congestion and dental problem.    Eyes: Negative for discharge.   Respiratory: Positive for cough and shortness of breath.    Cardiovascular: Negative for chest pain.   Musculoskeletal: Negative for arthralgias and back pain.   Skin: Negative for color change and pallor.   Allergic/Immunologic: Negative for environmental allergies.   Neurological: Positive for syncope. Negative for dizziness.   Psychiatric/Behavioral: Negative for agitation.     Objective:     Vital Signs (Most Recent):  Temp: 98.4 °F (36.9 °C) (10/06/18 1930)  Pulse: (!) 116 (10/06/18 2000)  Resp: (!) 26 (10/06/18 2000)  BP: (!) 99/52 (10/06/18 2000)  SpO2: 96 % (10/06/18 2000) Vital Signs (24h Range):  Temp:  [97.1 °F (36.2 °C)-100.2 °F (37.9 °C)] 98.4 °F (36.9 °C)  Pulse:  [] 116  Resp:  [16-31] 26  SpO2:  [93 %-100 %] 96 %  BP: ()/(52-78) 99/52     Weight: 114.8 kg (253 lb 1.4 oz)  Body mass index is 42.12 kg/m².    Physical Exam    Constitutional: She is oriented to person, place, and time. She appears well-developed and well-nourished. No distress.   HENT:   Head: Normocephalic and atraumatic.   Eyes: EOM are normal. Pupils are equal, round, and reactive to light. No scleral icterus.   Neck: Normal range of motion. Neck supple.   Cardiovascular: Normal rate, regular rhythm and normal heart sounds. Exam reveals no gallop and no friction rub.   No murmur heard.  Pulmonary/Chest: Effort normal and breath sounds normal. No stridor. No respiratory distress. She has no wheezes.   Abdominal: Soft.   Musculoskeletal: She exhibits edema.   Right knee area is bandaged.   Neurological: She is alert and oriented to person, place, and time.   Skin: Skin is warm and dry. She is not diaphoretic.   Psychiatric: She has a normal mood and affect.         CRANIAL NERVES     CN III, IV, VI   Pupils are equal, round, and reactive to light.  Extraocular motions are normal.     Significant Labs:   A1C: No results for input(s): HGBA1C in the last 4320 hours.  ABGs: No results for input(s): PH, PCO2, HCO3, POCSATURATED, BE, TOTALHB, COHB, METHB, O2HB, POCFIO2 in the last 48 hours.  Bilirubin:   Recent Labs   Lab  10/06/18   1600   BILITOT  0.4     Blood Culture: No results for input(s): LABBLOO in the last 48 hours.  BMP:   Recent Labs   Lab  10/06/18   1600   GLU  160*   NA  136   K  3.8   CL  105   CO2  19*   BUN  9   CREATININE  1.1   CALCIUM  8.5*     CBC:   Recent Labs   Lab  10/05/18   0610  10/06/18   0513  10/06/18   1600   WBC  12.30  10.00  16.00*   HGB  11.7*  11.1*  10.4*   HCT  35.7*  33.3*  31.3*   PLT  305  265  215     CMP:   Recent Labs   Lab  10/05/18   0610  10/06/18   0513  10/06/18   1600   NA  136  136  136   K  4.0  3.9  3.8   CL  102  103  105   CO2  26  26  19*   GLU  124*  103  160*   BUN  7  7  9   CREATININE  0.8  0.9  1.1   CALCIUM  8.9  8.8  8.5*   PROT   --    --   5.8*   ALBUMIN   --    --   2.7*   BILITOT   --    --   0.4   ALKPHOS    --    --   31*   AST   --    --   30   ALT   --    --   26   ANIONGAP  8  7*  12   EGFRNONAA  >60  >60  58*     Cardiac Markers:   Recent Labs   Lab  10/06/18   1600   BNP  32     Coagulation: No results for input(s): PT, INR, APTT in the last 48 hours.  Lactic Acid: No results for input(s): LACTATE in the last 48 hours.  Lipase: No results for input(s): LIPASE in the last 48 hours.  Lipid Panel: No results for input(s): CHOL, HDL, LDLCALC, TRIG, CHOLHDL in the last 48 hours.  Magnesium: No results for input(s): MG in the last 48 hours.  Pathology Results  (Last 10 years)    None        POCT Glucose: No results for input(s): POCTGLUCOSE in the last 48 hours.  Prealbumin: No results for input(s): PREALBUMIN in the last 48 hours.  Respiratory Culture: No results for input(s): GSRESP, RESPIRATORYC in the last 48 hours.  Troponin:   Recent Labs   Lab  10/06/18   1600   TROPONINI  0.316*     Significant Imaging: I have reviewed and interpreted all pertinent imaging results/findings within the past 24 hours.

## 2018-10-07 NOTE — PROGRESS NOTES
10/06/18 1955   Patient Assessment/Suction   Level of Consciousness (AVPU) alert   Respiratory Effort Unlabored   PRE-TX-O2-ETCO2   O2 Device (Oxygen Therapy) nasal cannula   Flow (L/min) 2   Oxygen Concentration (%) 28   SpO2 97 %   Pulse Oximetry Type Continuous   Pulse (!) 125   Resp (!) 25   Ready to Wean/Extubation Screen   FIO2<=50 (chart decimal) 0.28

## 2018-10-07 NOTE — PROGRESS NOTES
[facility] Medicine  Progress Note    Patient Name: Carola Blanco  MRN: [id number]  Patient Class: IP- Inpatient   Admission Date: 10/6/2018  Length of Stay: 1 days  Attending Physician: Guera Liu MD  Primary Care Provider: Prashanth Cabrera NP    Subjective:     Principal Problem:<principal problem not specified>    HPI:  Carola Blanco is a 52 y.o. female with PMHx of HTN who presented to the ED via EMS for evaluation of syncope and weakness that occurred immediately prior to arrival. Patient was discharged from [facility] earlier on the day of admission s/p a right knee replacement. Per EMS, patient's  stated that she was walking up the stairs when she lost consciousness and fell against him. Upon arrival of EMS, patient was difficult to arouse. She was given 1mg Narcan and IV fluids with improvement in mental status EMS states patient's CBG was 219 and patient was in mid 80's O2 saturation on room air en route to ED.  Patient denies chest pain, abdominal pain, and N/V. In the ED she had a CT angiogram that showed bilateral PEs. After discussing the case with Ortho she was started on 1mg/kg of Enoxaparin.    Hospital Course:  No notes on file    Interval History: Patient denied any acute complaints. Pain is well controlled. Reports a non productive cough.    Review of Systems   Constitutional: Negative for appetite change.   HENT: Negative for congestion and dental problem.    Eyes: Negative for discharge.   Respiratory: Positive for cough and shortness of breath.    Cardiovascular: Negative for chest pain.   Musculoskeletal: Negative for arthralgias and back pain.   Skin: Negative for color change and pallor.   Allergic/Immunologic: Negative for environmental allergies.   Neurological: Positive for syncope. Negative for dizziness.   Psychiatric/Behavioral: Negative for agitation.     Objective:     Vital Signs (Most Recent):  Temp: 98.6 °F (37 °C) (10/07/18 0330)  Pulse: 89  (10/07/18 0700)  Resp: 19 (10/07/18 0700)  BP: 114/70 (10/07/18 0700)  SpO2: 97 % (10/07/18 0700) Vital Signs (24h Range):  Temp:  [97.1 °F (36.2 °C)-100.2 °F (37.9 °C)] 98.6 °F (37 °C)  Pulse:  [] 89  Resp:  [13-31] 19  SpO2:  [93 %-100 %] 97 %  BP: ()/(52-78) 114/70     Weight: 114.8 kg (253 lb 1.4 oz)  Body mass index is 42.12 kg/m².    Intake/Output Summary (Last 24 hours) at 10/7/2018 0846  Last data filed at 10/7/2018 0130  Gross per 24 hour   Intake 240 ml   Output 950 ml   Net -710 ml      Physical Exam   Constitutional: She is oriented to person, place, and time. She appears well-developed and well-nourished. No distress.   HENT:   Head: Normocephalic and atraumatic.   Eyes: EOM are normal. Pupils are equal, round, and reactive to light. No scleral icterus.   Neck: Normal range of motion. Neck supple.   Cardiovascular: Normal rate, regular rhythm and normal heart sounds. Exam reveals no gallop and no friction rub.   No murmur heard.  Pulmonary/Chest: Effort normal and breath sounds normal. No stridor. No respiratory distress. She has no wheezes.   Abdominal: Soft.   Musculoskeletal: She exhibits edema.   Right knee area is bandaged.   Neurological: She is alert and oriented to person, place, and time.   Skin: Skin is warm and dry. She is not diaphoretic.   Psychiatric: She has a normal mood and affect.     Significant Labs:   A1C: No results for input(s): HGBA1C in the last 4320 hours.  ABGs: No results for input(s): PH, PCO2, HCO3, POCSATURATED, BE, TOTALHB, COHB, METHB, O2HB, POCFIO2 in the last 48 hours.  Bilirubin:   Recent Labs   Lab  10/06/18   1600   BILITOT  0.4     Blood Culture: No results for input(s): LABBLOO in the last 48 hours.  BMP:   Recent Labs   Lab  10/07/18   0614   GLU  93   NA  141   K  4.1   CL  105   CO2  26   BUN  12   CREATININE  1.0   CALCIUM  9.3     CBC:   Recent Labs   Lab  10/06/18   0513  10/06/18   1600  10/07/18   0614   WBC  10.00  16.00*  9.80   HGB  11.1*   10.4*  10.8*   HCT  33.3*  31.3*  32.7*   PLT  265  215  250     CMP:   Recent Labs   Lab  10/06/18   0513  10/06/18   1600  10/07/18   0614   NA  136  136  141   K  3.9  3.8  4.1   CL  103  105  105   CO2  26  19*  26   GLU  103  160*  93   BUN  7  9  12   CREATININE  0.9  1.1  1.0   CALCIUM  8.8  8.5*  9.3   PROT   --   5.8*   --    ALBUMIN   --   2.7*   --    BILITOT   --   0.4   --    ALKPHOS   --   31*   --    AST   --   30   --    ALT   --   26   --    ANIONGAP  7*  12  10   EGFRNONAA  >60  58*  >60     Cardiac Markers:   Recent Labs   Lab  10/06/18   1600   BNP  32     Coagulation: No results for input(s): PT, INR, APTT in the last 48 hours.  Lactic Acid: No results for input(s): LACTATE in the last 48 hours.  Lipase: No results for input(s): LIPASE in the last 48 hours.  Lipid Panel: No results for input(s): CHOL, HDL, LDLCALC, TRIG, CHOLHDL in the last 48 hours.  Magnesium: No results for input(s): MG in the last 48 hours.  Pathology Results  (Last 10 years)    None        POCT Glucose: No results for input(s): POCTGLUCOSE in the last 48 hours.  Prealbumin: No results for input(s): PREALBUMIN in the last 48 hours.  Respiratory Culture: No results for input(s): GSRESP, RESPIRATORYC in the last 48 hours.  Troponin:   Recent Labs   Lab  10/06/18   1600   TROPONINI  0.316*     TSH: No results for input(s): TSH in the last 4320 hours.    Significant Imaging: I have reviewed and interpreted all pertinent imaging results/findings within the past 24 hours.    Assessment/Plan:      Pulmonary embolus    - Bilateral PE with moderate clot burden.  - The patient was started on 1mg/kg of s/c Enoxaparin BID after discussion with Orthopedics.  - Of note the patient was on DVT prophylaxis on the previous admission.  - Consider transition to Warfarin/NOAC in the next 24-48 hours.  - Close monitoring in the ICU - can be transferred to the floor later today if she remains clinically stable.  - Will check an Echo to assess RV  function.          Primary osteoarthritis of right knee    - s/p R TKR on 10/6  - PT/OT evaluation.          Hypertension    - Hold antihypertensives for now.            VTE Risk Mitigation (From admission, onward)        Ordered     enoxaparin injection 120 mg  Every 12 hours (non-standard times)      10/06/18 1810        Critical care time spent on the evaluation and treatment of severe organ dysfunction, review of pertinent labs and imaging studies, discussions with consulting providers and discussions with patient/family: 30 minutes.    Deniz Samayoa MD  Department of Hospital Medicine   [facility]

## 2018-10-07 NOTE — HPI
Carola Blanco is a 52 y.o. female with PMHx of HTN who presented to the ED via EMS for evaluation of syncope and weakness that occurred immediately prior to arrival. Patient was discharged from [facility] earlier on the day of admission s/p a right knee replacement. Per EMS, patient's  stated that she was walking up the stairs when she lost consciousness and fell against him. Upon arrival of EMS, patient was difficult to arouse. She was given 1mg Narcan and IV fluids with improvement in mental status EMS states patient's CBG was 219 and patient was in mid 80's O2 saturation on room air en route to ED.  Patient denies chest pain, abdominal pain, and N/V. In the ED she had a CT angiogram that showed bilateral PEs. After discussing the case with Ortho she was started on 1mg/kg of Enoxaparin.

## 2018-10-07 NOTE — ASSESSMENT & PLAN NOTE
- Bilateral PE with moderate clot burden.  - The patient was started on 1mg/kg of s/c Enoxaparin BID after discussion with Orthopedics.  - Of note the patient was on DVT prophylaxis on the previous admission.  - Consider transition to Warfarin/NOAC in the next 24-48 hours.  - Close monitoring in the ICU - can be transferred to the floor later today if she remains clinically stable.  - Will check an Echo to assess RV function.

## 2018-10-08 LAB
ANION GAP SERPL CALC-SCNC: 9 MMOL/L
AT III ACT/NOR PPP CHRO: 78 %
BASOPHILS # BLD AUTO: 0 K/UL
BASOPHILS NFR BLD: 0.4 %
BUN SERPL-MCNC: 15 MG/DL
CALCIUM SERPL-MCNC: 9 MG/DL
CHLORIDE SERPL-SCNC: 104 MMOL/L
CO2 SERPL-SCNC: 27 MMOL/L
CREAT SERPL-MCNC: 0.9 MG/DL
DIFFERENTIAL METHOD: ABNORMAL
EOSINOPHIL # BLD AUTO: 0.3 K/UL
EOSINOPHIL NFR BLD: 3.4 %
ERYTHROCYTE [DISTWIDTH] IN BLOOD BY AUTOMATED COUNT: 14.1 %
EST. GFR  (AFRICAN AMERICAN): >60 ML/MIN/1.73 M^2
EST. GFR  (NON AFRICAN AMERICAN): >60 ML/MIN/1.73 M^2
GLUCOSE SERPL-MCNC: 116 MG/DL
HCT VFR BLD AUTO: 32.2 %
HGB BLD-MCNC: 10.7 G/DL
LYMPHOCYTES # BLD AUTO: 2.4 K/UL
LYMPHOCYTES NFR BLD: 26.7 %
MCH RBC QN AUTO: 27.4 PG
MCHC RBC AUTO-ENTMCNC: 33.3 G/DL
MCV RBC AUTO: 82 FL
MONOCYTES # BLD AUTO: 0.7 K/UL
MONOCYTES NFR BLD: 7.9 %
NEUTROPHILS # BLD AUTO: 5.6 K/UL
NEUTROPHILS NFR BLD: 61.6 %
PLATELET # BLD AUTO: 275 K/UL
PMV BLD AUTO: 8.1 FL
POTASSIUM SERPL-SCNC: 4.1 MMOL/L
RBC # BLD AUTO: 3.91 M/UL
SODIUM SERPL-SCNC: 140 MMOL/L
WBC # BLD AUTO: 9.1 K/UL

## 2018-10-08 PROCEDURE — 11000001 HC ACUTE MED/SURG PRIVATE ROOM

## 2018-10-08 PROCEDURE — 80048 BASIC METABOLIC PNL TOTAL CA: CPT

## 2018-10-08 PROCEDURE — 25000003 PHARM REV CODE 250: Performed by: HOSPITALIST

## 2018-10-08 PROCEDURE — 94761 N-INVAS EAR/PLS OXIMETRY MLT: CPT

## 2018-10-08 PROCEDURE — 25000003 PHARM REV CODE 250: Performed by: INTERNAL MEDICINE

## 2018-10-08 PROCEDURE — 85025 COMPLETE CBC W/AUTO DIFF WBC: CPT

## 2018-10-08 PROCEDURE — 36415 COLL VENOUS BLD VENIPUNCTURE: CPT

## 2018-10-08 PROCEDURE — 63600175 PHARM REV CODE 636 W HCPCS: Performed by: HOSPITALIST

## 2018-10-08 PROCEDURE — 99233 SBSQ HOSP IP/OBS HIGH 50: CPT | Mod: ,,, | Performed by: INTERNAL MEDICINE

## 2018-10-08 RX ORDER — LISINOPRIL 10 MG/1
10 TABLET ORAL NIGHTLY
Status: DISCONTINUED | OUTPATIENT
Start: 2018-10-08 | End: 2018-10-09 | Stop reason: HOSPADM

## 2018-10-08 RX ORDER — PANTOPRAZOLE SODIUM 40 MG/1
40 TABLET, DELAYED RELEASE ORAL DAILY
Status: DISCONTINUED | OUTPATIENT
Start: 2018-10-08 | End: 2018-10-09 | Stop reason: HOSPADM

## 2018-10-08 RX ADMIN — OXYCODONE AND ACETAMINOPHEN 1 TABLET: 10; 325 TABLET ORAL at 08:10

## 2018-10-08 RX ADMIN — OXYCODONE AND ACETAMINOPHEN 1 TABLET: 10; 325 TABLET ORAL at 04:10

## 2018-10-08 RX ADMIN — ENOXAPARIN SODIUM 120 MG: 150 INJECTION SUBCUTANEOUS at 05:10

## 2018-10-08 RX ADMIN — OXYCODONE AND ACETAMINOPHEN 1 TABLET: 10; 325 TABLET ORAL at 09:10

## 2018-10-08 RX ADMIN — OXYCODONE AND ACETAMINOPHEN 1 TABLET: 10; 325 TABLET ORAL at 01:10

## 2018-10-08 RX ADMIN — APIXABAN 5 MG: 2.5 TABLET, FILM COATED ORAL at 05:10

## 2018-10-08 RX ADMIN — OXYCODONE AND ACETAMINOPHEN 1 TABLET: 10; 325 TABLET ORAL at 05:10

## 2018-10-08 NOTE — PLAN OF CARE
Pt, who is independent with her self care, lives with her spouse, adult daughter and two grandchildren.  Pt denies current home health services and has a rolling walker, SC and BSC at home.  Pt verified her PCP as Prashanth billy NP and insurance as BC/BS.  Pt's discharge disposition is home with [facility].  Pt was admitted 10/4-10/6/18 for a right total knee arthroplasty performed by Dr. Bang on 10/4. The pt returned a few hours later after experiencing a loss of consciousness with pain medication. Katey Bishop LMSW     10/08/18 0854   Readmission Questionnaire   At the time of your discharge, did someone talk to you about what your health problems were? Yes   At the time of discharge, did someone talk to you about what to watch out for regarding worsening of your health problem? Yes   At the time of discharge, did someone talk to you about what to do if you experienced worsening of your health problem? Yes   At the time of discharge, did someone talk to you about which medication to take when you left the hospital and which ones to stop taking? Yes   At the time of discharge, did someone talk to you about when and where to follow up with a doctor after you left the hospital? Yes   How often do you need to have someone help you when you read instructions, pamphlets, or other written material from your doctor or pharmacy? Rarely   Do you have problems taking your medications as prescribed? No   Do you have any problems affording any of  your prescribed medications? No   Do you have problems obtaining/receiving your medications? No   Does the patient have transportation to healthcare appointments? Yes   Lives With child(carlos), adult   Does your caregiver provide all the help you need? Yes   Are you currently feeling confused? No   Are you currently having problems thinking? No   Are you currently having memory problems? No   Have you felt down, depressed, or hopeless? 0   Have you felt little interest or  pleasure in doing things? 0

## 2018-10-08 NOTE — PLAN OF CARE
Problem: Patient Care Overview  Goal: Plan of Care Review  Outcome: Ongoing (interventions implemented as appropriate)  Pt on room air with 97% sats.  .

## 2018-10-08 NOTE — PROGRESS NOTES
Progress Note  Hospital Medicine  Patient Name:Carola Blanco  MRN:  [id number]  Patient Class: IP- Inpatient  Admit Date: 10/6/2018  Length of Stay: 2 days  Expected Discharge Date:   Attending Physician: Guera Liu MD  Primary Care Provider:  Prashanth Cabrera NP    SUBJECTIVE:     Principal Problem: Acute Bilateral PE  Initial history of present illness: Carola Blanco is a 52 y.o. female with PMHx of HTN who presented to the ED via EMS for evaluation of syncope and weakness that occurred immediately prior to arrival. Patient was discharged from [facility] earlier today s/p a right knee replacement that occurred x2 days ago. Per EMS, patient's  stated that she was walking up the stairs when she lost consciousness and fell against him. Upon arrival of EMS, patient was difficult to arouse. She was given 1mg Narcan and IV fluids with improvement in mental status EMS states patient's CBG was 219 and patient was in mid 80's O2 saturation on room air en route to ED.  Patient denies chest pain, abdominal pain, and N/V. In the ED she had a CT angiogram that showed bilateral PEs. After discussing the case with Ortho she was started on 1mg/kg of Enoxaparin.    PMH/PSH/SH/FH/Meds: reviewed.    Symptoms/Review of Systems:  Doing well. Pain controlled. No shortness of breath, cough, chest pain or headache, fever or abdominal pain.     Diet:  Adequate intake.    Activity level: Normal.    Pain:  Patient reports no pain.       OBJECTIVE:   Vital Signs (Most Recent):      Temp: 97.2 °F (36.2 °C) (10/08/18 0755)  Pulse: 89 (10/08/18 0755)  Resp: 18 (10/08/18 0755)  BP: 139/74 (10/08/18 0755)  SpO2: (!) 92 % (10/08/18 0755)       Vital Signs Range (Last 24H):  Temp:  [97.2 °F (36.2 °C)-99 °F (37.2 °C)]   Pulse:  [84-97]   Resp:  [18-38]   BP: (101-139)/(56-82)   SpO2:  [92 %-100 %]     Weight: 114.8 kg (253 lb 1.4 oz)  Body mass index is 42.12 kg/m².    Intake/Output Summary (Last 24 hours) at 10/8/2018 0900  Last data  filed at 10/8/2018 0852  Gross per 24 hour   Intake 490 ml   Output 450 ml   Net 40 ml     Physical Examination:  Constitutional: She is oriented to person, place, and time. She appears well-developed and well-nourished. No distress.   HENT:   Head: Normocephalic and atraumatic.   Eyes: EOM are normal. Pupils are equal, round, and reactive to light. No scleral icterus.   Neck: Normal range of motion. Neck supple.   Cardiovascular: Normal rate, regular rhythm and normal heart sounds. Exam reveals no gallop and no friction rub.   No murmur heard.  Pulmonary/Chest: Effort normal and breath sounds normal. No stridor. No respiratory distress. She has no wheezes.   Abdominal: Soft.   Musculoskeletal: She exhibits edema.   Right knee area is bandaged.   Neurological: She is alert and oriented to person, place, and time.   Skin: Skin is warm and dry. She is not diaphoretic.   Psychiatric: She has a normal mood and affect.   CRANIAL NERVES   CN III, IV, VI   Pupils are equal, round, and reactive to light.  Extraocular motions are normal.     CBC:  Recent Labs   Lab  10/06/18   1600  10/07/18   0614  10/08/18   0447   WBC  16.00*  9.80  9.10   RBC  3.81*  3.99*  3.91*   HGB  10.4*  10.8*  10.7*   HCT  31.3*  32.7*  32.2*   PLT  215  250  275   MCV  82  82  82   MCH  27.3  27.1  27.4   MCHC  33.3  33.1  33.3   BMP  Recent Labs   Lab  10/06/18   1600  10/07/18   0614  10/08/18   0447   GLU  160*  93  116*   NA  136  141  140   K  3.8  4.1  4.1   CL  105  105  104   CO2  19*  26  27   BUN  9  12  15   CREATININE  1.1  1.0  0.9   CALCIUM  8.5*  9.3  9.0      Diagnostic Results:  Right knee x-ray: Right knee total arthroplasty with no acute hardware complication.    CTA chest:  1. Bilateral pulmonary emboli with moderate clot burden and evidence of right heart strain.  2. Mosaic attenuation in the lungs suggestive of mild pulmonary edema.  3. Cardiomegaly.  4. Left lung pulmonary nodule.  In a low risk patient no further  follow-up.  In a high-risk patient consider 12 month follow-up CT.  This report was flagged in Epic as abnormal.    CXR: No new airspace disease.    ECHO: Pending.    Assessment/Plan:     Pulmonary embolus     - Bilateral PE with moderate clot burden.  - Dc Lovenox. Start PO Eliquis 5 mg BID.   - Of note the patient was on DVT prophylaxis on the previous admission.  - We discussed risks and benefits of NOAC rtherapy. Answered all the questions. Fall precautions discussed. Patient's  answered all the questions. Time spent in care of the patient, counseling and coordination of care (Greater than 50% spent in direct face to face contact): 35 min.                 Hypertension     - Hold antihypertensives for now.         *S/P total knee arthroplasty, right [Z96.651]   Not Applicable    Primary osteoarthritis of right knee [M17.11]   Yes    Primary osteoarthritis of left knee [M17.12]  Continue to follow Orthopedic recommendations.  Needs aggressive incentive spirometry.  Follow hemoglobin and hematocrit closely.  Pain control with PO narcotics and antiemetics as needed.  Physical therapy as per Orthopedics protocol with fall precautions. Discussed with Dr. Sinha.      Yes    Hypertension [I10]  Chronic problem. Will continue chronic medications and monitor for any changes, adjusting as needed.      Yes     VTE Risk Mitigation (From admission, onward)        Ordered     apixaban tablet 5 mg  2 times daily      10/08/18 1641        Guera Liu MD  Department of Hospital Medicine   [facility]

## 2018-10-08 NOTE — PLAN OF CARE
Per Dr Liu's request looked up pt's formulary for anti-coagulants; both Eliquis and Xeralto are tier 2 medications, Dr Liu notified.....JASMEET Christianson       10/08/18 3436   Discharge Reassessment   Assessment Type Discharge Planning Reassessment

## 2018-10-08 NOTE — PLAN OF CARE
10/07/18 2007   Patient Assessment/Suction   Level of Consciousness (AVPU) alert   Respiratory Effort Unlabored   Expansion/Accessory Muscles/Retractions no retractions;no use of accessory muscles   All Lung Fields Breath Sounds clear;diminished   Rhythm/Pattern, Respiratory unlabored   Cough Frequency infrequent   Cough Type good;nonproductive   PRE-TX-O2-ETCO2   O2 Device (Oxygen Therapy) nasal cannula   Flow (L/min) 2   Oxygen Concentration (%) 28   SpO2 99 %   Pulse Oximetry Type Intermittent   Ready to Wean/Extubation Screen   FIO2<=50 (chart decimal) 0.28

## 2018-10-08 NOTE — PLAN OF CARE
Per Britany, 2nd floor charge nurse, the pt stated that she can not locate her borrowed walker and will need a walker ordered at discharge. Per Jose Armando Motley RN note on 10/5/18, he placed a DME order through [facility] and updated TIARA Chavez. Katey Bishop LMSW     10/08/18 1208   Discharge Assessment   Assessment Type Discharge Planning Reassessment

## 2018-10-08 NOTE — PLAN OF CARE
Problem: Patient Care Overview  Goal: Plan of Care Review  Outcome: Ongoing (interventions implemented as appropriate)  Bed is in low position, call light is within reach to maintain fall precautions, PRN pain medication given,  is at the bedside, Patient is resting between care.

## 2018-10-09 VITALS
OXYGEN SATURATION: 93 % | TEMPERATURE: 98 F | WEIGHT: 253.06 LBS | SYSTOLIC BLOOD PRESSURE: 144 MMHG | DIASTOLIC BLOOD PRESSURE: 84 MMHG | BODY MASS INDEX: 42.16 KG/M2 | RESPIRATION RATE: 22 BRPM | HEART RATE: 96 BPM | HEIGHT: 65 IN

## 2018-10-09 DIAGNOSIS — M17.11 PRIMARY OSTEOARTHRITIS OF RIGHT KNEE: Primary | ICD-10-CM

## 2018-10-09 LAB
ANION GAP SERPL CALC-SCNC: 8 MMOL/L
BASOPHILS # BLD AUTO: 0 K/UL
BASOPHILS NFR BLD: 0.4 %
BUN SERPL-MCNC: 16 MG/DL
CALCIUM SERPL-MCNC: 9.2 MG/DL
CHLORIDE SERPL-SCNC: 104 MMOL/L
CO2 SERPL-SCNC: 26 MMOL/L
CREAT SERPL-MCNC: 0.8 MG/DL
DIFFERENTIAL METHOD: ABNORMAL
EOSINOPHIL # BLD AUTO: 0.3 K/UL
EOSINOPHIL NFR BLD: 3.7 %
ERYTHROCYTE [DISTWIDTH] IN BLOOD BY AUTOMATED COUNT: 14.1 %
EST. GFR  (AFRICAN AMERICAN): >60 ML/MIN/1.73 M^2
EST. GFR  (NON AFRICAN AMERICAN): >60 ML/MIN/1.73 M^2
GLUCOSE SERPL-MCNC: 104 MG/DL
HCT VFR BLD AUTO: 33.3 %
HGB BLD-MCNC: 11 G/DL
LYMPHOCYTES # BLD AUTO: 1.8 K/UL
LYMPHOCYTES NFR BLD: 20.3 %
MCH RBC QN AUTO: 27 PG
MCHC RBC AUTO-ENTMCNC: 32.9 G/DL
MCV RBC AUTO: 82 FL
MONOCYTES # BLD AUTO: 0.6 K/UL
MONOCYTES NFR BLD: 6.6 %
NEUTROPHILS # BLD AUTO: 6 K/UL
NEUTROPHILS NFR BLD: 69 %
PLATELET # BLD AUTO: 300 K/UL
PMV BLD AUTO: 7.7 FL
POTASSIUM SERPL-SCNC: 4.1 MMOL/L
RBC # BLD AUTO: 4.06 M/UL
SODIUM SERPL-SCNC: 138 MMOL/L
WBC # BLD AUTO: 8.7 K/UL

## 2018-10-09 PROCEDURE — 80048 BASIC METABOLIC PNL TOTAL CA: CPT

## 2018-10-09 PROCEDURE — G8979 MOBILITY GOAL STATUS: HCPCS | Mod: CI | Performed by: PHYSICAL THERAPIST

## 2018-10-09 PROCEDURE — 97161 PT EVAL LOW COMPLEX 20 MIN: CPT | Performed by: PHYSICAL THERAPIST

## 2018-10-09 PROCEDURE — 36415 COLL VENOUS BLD VENIPUNCTURE: CPT

## 2018-10-09 PROCEDURE — 27000221 HC OXYGEN, UP TO 24 HOURS

## 2018-10-09 PROCEDURE — 25000003 PHARM REV CODE 250: Performed by: INTERNAL MEDICINE

## 2018-10-09 PROCEDURE — 97116 GAIT TRAINING THERAPY: CPT | Performed by: PHYSICAL THERAPIST

## 2018-10-09 PROCEDURE — 25000003 PHARM REV CODE 250: Performed by: HOSPITALIST

## 2018-10-09 PROCEDURE — 85025 COMPLETE CBC W/AUTO DIFF WBC: CPT

## 2018-10-09 PROCEDURE — 97110 THERAPEUTIC EXERCISES: CPT | Performed by: PHYSICAL THERAPIST

## 2018-10-09 PROCEDURE — G8978 MOBILITY CURRENT STATUS: HCPCS | Mod: CJ | Performed by: PHYSICAL THERAPIST

## 2018-10-09 PROCEDURE — 99239 HOSP IP/OBS DSCHRG MGMT >30: CPT | Mod: ,,, | Performed by: INTERNAL MEDICINE

## 2018-10-09 RX ORDER — SYRING-NEEDL,DISP,INSUL,0.3 ML 29 G X1/2"
296 SYRINGE, EMPTY DISPOSABLE MISCELLANEOUS ONCE
Status: COMPLETED | OUTPATIENT
Start: 2018-10-09 | End: 2018-10-09

## 2018-10-09 RX ORDER — POLYETHYLENE GLYCOL 3350 17 G/17G
17 POWDER, FOR SOLUTION ORAL 2 TIMES DAILY PRN
Status: DISCONTINUED | OUTPATIENT
Start: 2018-10-09 | End: 2018-10-09 | Stop reason: HOSPADM

## 2018-10-09 RX ORDER — DOCUSATE SODIUM 100 MG/1
100 CAPSULE, LIQUID FILLED ORAL DAILY
Status: DISCONTINUED | OUTPATIENT
Start: 2018-10-09 | End: 2018-10-09 | Stop reason: HOSPADM

## 2018-10-09 RX ORDER — OXYCODONE AND ACETAMINOPHEN 10; 325 MG/1; MG/1
1 TABLET ORAL EVERY 4 HOURS PRN
Qty: 42 TABLET | Refills: 0 | OUTPATIENT
Start: 2018-10-09 | End: 2018-10-09 | Stop reason: HOSPADM

## 2018-10-09 RX ORDER — DOCUSATE SODIUM 100 MG/1
100 CAPSULE, LIQUID FILLED ORAL DAILY
Refills: 0 | COMMUNITY
Start: 2018-10-10 | End: 2023-03-21

## 2018-10-09 RX ADMIN — APIXABAN 5 MG: 2.5 TABLET, FILM COATED ORAL at 08:10

## 2018-10-09 RX ADMIN — PANTOPRAZOLE SODIUM 40 MG: 40 TABLET, DELAYED RELEASE ORAL at 08:10

## 2018-10-09 RX ADMIN — OXYCODONE AND ACETAMINOPHEN 1 TABLET: 10; 325 TABLET ORAL at 11:10

## 2018-10-09 RX ADMIN — OXYCODONE AND ACETAMINOPHEN 1 TABLET: 10; 325 TABLET ORAL at 06:10

## 2018-10-09 RX ADMIN — OXYCODONE AND ACETAMINOPHEN 1 TABLET: 10; 325 TABLET ORAL at 02:10

## 2018-10-09 RX ADMIN — POLYETHYLENE GLYCOL 3350 17 G: 17 POWDER, FOR SOLUTION ORAL at 08:10

## 2018-10-09 RX ADMIN — Medication 296 ML: at 02:10

## 2018-10-09 RX ADMIN — APIXABAN 5 MG: 2.5 TABLET, FILM COATED ORAL at 11:10

## 2018-10-09 RX ADMIN — OXYCODONE AND ACETAMINOPHEN 1 TABLET: 10; 325 TABLET ORAL at 03:10

## 2018-10-09 RX ADMIN — DOCUSATE SODIUM 100 MG: 100 CAPSULE, LIQUID FILLED ORAL at 08:10

## 2018-10-09 NOTE — DISCHARGE SUMMARY
Discharge Summary  Hospital Medicine    Admit Date: 10/6/2018    Date and Time: 10/9/24723:46 PM    Discharge Attending Physician: Guera Liu MD    Primary Care Physician: Prashanth Cabrera NP    Diagnoses:  Active Hospital Problems    Diagnosis  POA    *Pulmonary embolus [I26.99] s/p right TKA  Yes    Primary osteoarthritis of right knee [M17.11]  Yes    Hypertension [I10]  Yes      Resolved Hospital Problems   No resolved problems to display.     Discharged Condition: Good    Hospital Course:   Carola Blanco is a 52 y.o. female with PMHx of HTN who presented to the ED via EMS for evaluation of syncope and weakness that occurred immediately prior to arrival. Patient was discharged from [facility] earlier s/p a right knee replacement that occurred x2 days ago. Per EMS, patient's  stated that she was walking up the stairs when she lost consciousness and fell against him. Upon arrival of EMS, patient was difficult to arouse. She was given 1mg Narcan and IV fluids with improvement in mental status EMS states patient's CBG was 219 and patient was in mid 80's O2 saturation on room air en route to ED.  Patient denied chest pain, abdominal pain, and N/V. In the ED she had a CT angiogram that showed bilateral PEs. After discussing the case with Ortho she was started on 1mg/kg of Enoxaparin. Patient was admitted to Hospitalist medicine service. Patient was evaluated by Dr. Bang. Patient later transition to oral NOAC therapy. Risks related to Eliquis use reviewed with patient and her . Fall precautions reviewed. Patient did not qualify for home oxygen need. Patient was discharged home in stable condition with following discharge plan of care. Total time with the patient was 30 minutes and greater than 50% was spent in counseling and coordination of care. The assessment and plan have been discussed at length. Physicians' notes reviewed. Labs and procedure reviewed.     Consults:   Beti    Significant Diagnostic Studies:   Right knee x-ray: Right knee total arthroplasty with no acute hardware complication.     CTA chest:  1. Bilateral pulmonary emboli with moderate clot burden and evidence of right heart strain.  2. Mosaic attenuation in the lungs suggestive of mild pulmonary edema.  3. Cardiomegaly.  4. Left lung pulmonary nodule.  In a low risk patient no further follow-up.  In a high-risk patient consider 12 month follow-up CT.  This report was flagged in Epic as abnormal.     CXR: No new airspace disease.     ECHO: Pending.     B/L venous doppler scan: No evidence of deep venous thrombosis in either lower extremity. Right popliteal cyst    Microbiology Results (last 7 days)     ** No results found for the last 168 hours. **        Special Treatments/Procedures: None  Disposition: Home or Self Care    Medications:  Reconciled Home Medications:   Current Discharge Medication List      START taking these medications    Details   !! apixaban (ELIQUIS) 5 mg Tab Take 2 tablets (10 mg total) by mouth 2 (two) times daily. for 6 days  Qty: 24 tablet, Refills: 0      !! apixaban (ELIQUIS) 5 mg Tab Take 1 tablet (5 mg total) by mouth 2 (two) times daily.      docusate sodium (COLACE) 100 MG capsule Take 1 capsule (100 mg total) by mouth once daily.  Refills: 0       !! - Potential duplicate medications found. Please discuss with provider.      CONTINUE these medications which have NOT CHANGED    Details   acetaminophen (TYLENOL) 325 MG tablet Take 1 tablet (325 mg total) by mouth 2 (two) times daily.  Refills: 0      aspirin 325 MG tablet Take 1 tablet (325 mg total) by mouth once daily.  Refills: 0      diclofenac sodium (VOLTAREN) 1 % Gel Apply 4 g topically 4 (four) times daily.  Qty: 100 g, Refills: 0    Associated Diagnoses: Primary osteoarthritis of both knees      lisinopril 10 MG tablet Take 1 tablet (10 mg total) by mouth every evening.  Qty: 30 tablet, Refills: 0    Associated Diagnoses:  "Essential hypertension      metronidazole 1% (METROGEL) 1 % Gel Apply topically once daily.  Qty: 1 Tube, Refills: 0    Associated Diagnoses: Rosacea      oxyCODONE-acetaminophen (PERCOCET)  mg per tablet Take 1 tablet by mouth every 4 (four) hours as needed for Pain.  Refills: 0      traZODone (DESYREL) 50 MG tablet Take 2 tablets (100 mg total) by mouth every evening. 1-2 tablets by mouth daily at bedtime when necessary insomnia  Qty: 60 tablet, Refills: 1    Associated Diagnoses: Insomnia due to medical condition      zolpidem (AMBIEN) 10 mg Tab Take 5 mg by mouth nightly as needed.          STOP taking these medications       sulfamethoxazole-trimethoprim 400-80mg (BACTRIM,SEPTRA) 400-80 mg per tablet Comments:   Reason for Stopping:             Discharge Procedure Orders   WALKER FOR HOME USE     Order Specific Question Answer Comments   Type of Walker: Adult (5'4"-6'6")    With wheels? Yes 2 wheels    Height: 5' 5" (1.651 m)    Weight: 114.8 kg (253 lb 1.4 oz)    Length of need (1-99 months): 11    Please check all that apply: Patient's condition impairs ambulation.      Ambulatory referral to Home Health   Referral Priority: Routine Referral Type: Home Health   Referral Reason: Specialty Services Required   Requested Specialty: Home Health Services   Number of Visits Requested: 1     Diet Cardiac     Other restrictions (specify):   Order Comments: PLEASE OBSERVE FALL PRECAUTIONS     Call MD for:   Order Comments: For worsening symptoms, chest pain, shortness of breath, increased abdominal pain, high grade fever, stroke or stroke like symptoms, immediately go to the nearest Emergency Room or call 911 as soon as possible.     Follow-up Information     Prashanth Cabrera NP In 1 week.    Specialty:  Family Medicine  Contact information:  [address] [place] [address]  [phone]             Markie Bang MD In 1 week.    Specialties:  Orthopedic Surgery, General Surgery, Surgery  Contact " information:  [address]  [address]  [place] [address]  [phone]

## 2018-10-09 NOTE — PT/OT/SLP EVAL
Physical Therapy Evaluation    Patient Name:  Carola Blanco   MRN:  [id number]    Recommendations:     Discharge Recommendations:  home health PT   Discharge Equipment Recommendations: walker, rolling(Pt was not able to locate the one she was going to use after her TKA surgery. )   Barriers to discharge: Pt does not have a RW at home and will need one for ambulation prior to d/c.     Assessment:     Carola Blanco is a 52 y.o. female admitted with a medical diagnosis of B PE s/p recent surgery of R TKA on 10/6/2018.  She presents with the following impairments/functional limitations:  weakness, impaired self care skills, impaired balance, decreased ROM, impaired joint extensibility, impaired functional mobilty, pain, gait instability, orthopedic precautions, impaired cardiopulmonary response to activity, decreased lower extremity function.  Pt continues to hold R LE extremely guarded with poor activation of R quad.  She required use of gait belt used as a leg  to transfer supine<>sit EOB.  She was able to perform sit<>stand with CGA and RW.  She ambulated 60ft with RW and CGA but required vc's for increased knee flexion and heel strike on the right.  She will need a RW and HHPT upon d/c.    Rehab Prognosis:  good; patient would benefit from acute skilled PT services to address these deficits and reach maximum level of function.      Recent Surgery: * No surgery found *      Plan:     During this hospitalization, patient to be seen daily to address the above listed problems via gait training, therapeutic activities, therapeutic exercises  · Plan of Care Expires:  10/16/18   Plan of Care Reviewed with: patient    Subjective     Communicated with JASMEET Baird prior to session.  Patient found supine in bed upon PT entry to room, agreeable to evaluation.      Chief Complaint: pain in R knee  Patient comments/goals: Pt wants to be able to go up/down steps to get to her bedroom.    Pain/Comfort:  · Pain Rating 1:  5/10  · Location - Side 1: Right  · Location 1: knee  · Pain Addressed 1: Reposition, Distraction, Nurse notified  · Pain Rating Post-Intervention 1: 6/10    Patients cultural, spiritual, Christian conflicts given the current situation: none    Living Environment:  Pt lives with her  in a 2 story home with 1 step to enter, and 14 steps up to her bed/bathroom.    Prior to admission, patients level of function was independent.  Patient has the following equipment: none.  DME owned (not currently used): bedside commode.  Upon discharge, patient will have assistance from .    Objective:     Patient found with: telemetry, peripheral IV     General Precautions: Standard, fall   Orthopedic Precautions:RLE weight bearing as tolerated   Braces: N/A     Exams:  · Cognitive Exam:  Patient is oriented to Person, Place, Time and Situation  · Postural Exam:  Patient presented with the following abnormalities:    · -       Rounded shoulders  · -       Forward head  · -       Pt has leans strongly to the left due to guarding from R knee pain.  Educated pt on importance of even weight bearing on hips even when sitting in the chair..  · RUE ROM: WFL  · RUE Strength: WFL  · LUE ROM: WFL  · LUE Strength: WFL  · RLE ROM: Deficits: R knee lacking 10* from full extension, and only able to achieve ~40* of flexion sitting in the chair.   · RLE Strength: Deficits: Ankle DF/PF: WFL, knee extension: 2/5 - extremely poor quad activation, hip flexion: 3/5  · LLE ROM: WFL  · LLE Strength: WFL    Functional Mobility:  · Bed Mobility:     · Supine to Sit: modified independence and use of gait belt around R foot to use as a leg  to get to the EOB.    · Transfers:     · Sit to Stand:  contact guard assistance with rolling walker  · Gait: 60ft with RW and CGA.  She demonstrates compensation on the R LE via R hip circumduction.  Vc's to increase knee flexion for toe off and swing through phase, as well as heel strike on the R to  optimize gait sequence.  Pt states that she normally drags her feet.   · Balance: Dynamic standing balance: Fair    AM-PAC 6 CLICK MOBILITY  Total Score:20       Therapeutic Activities and Exercises:   As above.  Supine exercises included: AP's, SAQ's, and SLR x10 reps with Min A  Seated LAQ with minimal elevation x 5 reps, and hamstring curls x 5 reps with minimal ROM.    Patient left up in chair with all lines intact, call button in reach and RN Oli notified.    GOALS:   Multidisciplinary Problems     Physical Therapy Goals        Problem: Physical Therapy Goal    Goal Priority Disciplines Outcome Goal Variances Interventions   Physical Therapy Goal     PT, PT/OT Ongoing (interventions implemented as appropriate)                     History:     Past Medical History:   Diagnosis Date    Arthritis     Hypertension        Past Surgical History:   Procedure Laterality Date    ARTHROPLASTY-KNEE Left 4/19/2018    [facility]    JOINT REPLACEMENT      KNEE SURGERY Left 04/19/2018       Clinical Decision Making:     History  Co-morbidities and personal factors that may impact the plan of care Examination  Body Structures and Functions, activity limitations and participation restrictions that may impact the plan of care Clinical Presentation   Decision Making/ Complexity Score   Co-morbidities:   [] Time since onset of injury / illness / exacerbation  [] Status of current condition  []Patient's cognitive status and safety concerns    [] Multiple Medical Problems (see med hx)  Personal Factors:   [] Patient's age  [] Prior Level of function   [] Patient's home situation (environment and family support)  [] Patient's level of motivation  [] Expected progression of patient      HISTORY:(criteria)    [] 24705 - no personal factors/history    [] 57926 - has 1-2 personal factor/comorbidity     [] 49316 - has >3 personal factor/comorbidity     Body Regions:  [] Objective examination  findings  [] Head     []  Neck  [] Trunk   [] Upper Extremity  [] Lower Extremity    Body Systems:  [] For communication ability, affect, cognition, language, and learning style: the assessment of the ability to make needs known, consciousness, orientation (person, place, and time), expected emotional /behavioral responses, and learning preferences (eg, learning barriers, education  needs)  [] For the neuromuscular system: a general assessment of gross coordinated movement (eg, balance, gait, locomotion, transfers, and transitions) and motor function  (motor control and motor learning)  [] For the musculoskeletal system: the assessment of gross symmetry, gross range of motion, gross strength, height, and weight  [] For the integumentary system: the assessment of pliability(texture), presence of scar formation, skin color, and skin integrity  [] For cardiovascular/pulmonary system: the assessment of heart rate, respiratory rate, blood pressure, and edema     Activity limitations:    [] Patient's cognitive status and saf ety concerns          [] Status of current condition      [] Weight bearing restriction  [] Cardiopulmunary Restriction    Participation Restrictions:   [] Goals and goal agreement with the patient     [] Rehab potential (prognosis) and probable outcome      Examination of Body System: (criteria)    [] 38112 - addressing 1-2 elements    [] 85703 - addressing a total of 3 or more elements     [] 86313 -  Addressing a total of 4 or more elements         Clinical Presentation: (criteria)  Choose one     On examination of body system using standardized tests and measures patient presents with (CHOOSE ONE) elements from any of the following: body structures and functions, activity limitations, and/or participation restrictions.  Leading to a clinical presentation that is considered (CHOOSE ONE)                              Clinical Decision Making  (Eval Complexity):  Choose One     Time Tracking:     PT  Received On: 10/09/18  PT Start Time: 1039     PT Stop Time: 1105  PT Total Time (min): 26 min     Billable Minutes: Evaluation 8, Gait Training 9 and Therapeutic Exercise 9      Theresa Ohara, PT  10/09/2018

## 2018-10-09 NOTE — PLAN OF CARE
Problem: Patient Care Overview  Goal: Plan of Care Review  Outcome: Ongoing (interventions implemented as appropriate)  Alert and oriented. Fall and injury free. Pain med given as ordered. Relief noted. Bed in low position and locked.  Side rails up x 2. Call bell in reach. Telemetry

## 2018-10-09 NOTE — PLAN OF CARE
Ok to pull rolling walker from [facility] closet per Fozia with [facility] [phone].  [facility] delivered rolling walker to patient's hospital room.  Patient signed delivery ticket.TIARA Hernandez

## 2018-10-09 NOTE — PLAN OF CARE
Problem: Physical Therapy Goal  Goal: Physical Therapy Goal  Outcome: Ongoing (interventions implemented as appropriate)  Goals to be met by: 10/16/2018     Patient will increase functional independence with mobility by performin. Supine to sit with Modified Wayne  2. Sit to supine with Modified Wayne  3. Sit to stand transfer with Stand-by Assistance  4. Gait  x 100 feet with Stand-by Assistance using Rolling Walker.   5. Ascend/descend 5 stair with bilateral Handrails Contact Guard Assistance.   6. Lower extremity exercise program x10-20 reps per handout, with independence    Comments: Goals established and progressing towards.

## 2018-10-09 NOTE — NURSING
PIV removed x 2. Tele removed and returned to monitor room. D/c instructions given and explained,pt verbalized understanding. Pt aware to make f/u appts. Pharmacist reviewed Eliquis with pt. She is aware to pick it up from the pharmacy. dsg c/d/i. Pt currently waiting for  to pick her up.

## 2018-10-09 NOTE — PLAN OF CARE
Called the pt's Eliquis into [facility] on [address]; the pt will be responsible for paying $87.31 and does not require a prior-authorization.   I notified the pt of the above and provided her with a  Eliquis coupon card.  The pt is also stating she never had her pain medication filled when she was last discharged and does not know what happened to her prescription after she came back to the ER.   I spoke with Shakira at Dr Bang's office regarding the pt's need for a new prescription, she states the pt can come by the office on her way home and pick one up. I notified the pt's nurse, Oli of the above who let the pt know......JASMEET Christianson      10/09/18 2351   Discharge Reassessment   Assessment Type Discharge Planning Reassessment

## 2018-10-10 LAB
AORTIC ROOT ANNULUS: 2.34 CM
AORTIC VALVE CUSP SEPERATION: 2.15 CM
AV MEAN GRADIENT: 2.75 MMHG
AV PEAK GRADIENT: 4.68 MMHG
AV VALVE AREA: 2.94 CM2
BSA FOR ECHO PROCEDURE: 2.29 M2
CV ECHO LV RWT: 0.42 CM
DOP CALC AO PEAK VEL: 1.08 M/S
DOP CALC AO VTI: 15.12 CM
DOP CALC LVOT AREA: 3.05 CM2
DOP CALC LVOT DIAMETER: 1.97 CM
DOP CALC LVOT STROKE VOLUME: 44.48 CM3
DOP CALCLVOT PEAK VEL VTI: 14.6 CM
E WAVE DECELERATION TIME: 142.1 MSEC
E/A RATIO: 1.17
E/E' RATIO: 9.25
ECHO LV POSTERIOR WALL: 0.95 CM (ref 0.6–1.1)
F5 P.R506Q BLD/T QL: NORMAL
FRACTIONAL SHORTENING: 39 % (ref 28–44)
INTERVENTRICULAR SEPTUM: 0.92 CM (ref 0.6–1.1)
IVRT: 0.09 MSEC
LEFT ATRIUM SIZE: 3.12 CM
LEFT INTERNAL DIMENSION IN SYSTOLE: 2.72 CM (ref 2.1–4)
LEFT VENTRICLE MASS INDEX: 60.4 G/M2
LEFT VENTRICULAR INTERNAL DIMENSION IN DIASTOLE: 4.47 CM (ref 3.5–6)
LEFT VENTRICULAR MASS: 138.32 G
LV LATERAL E/E' RATIO: 8.22
LV SEPTAL E/E' RATIO: 10.57
MV PEAK A VEL: 0.63 M/S
MV PEAK E VEL: 0.74 M/S
MV STENOSIS PRESSURE HALF TIME: 41.21 MS
MV VALVE AREA P 1/2 METHOD: 5.34 CM2
PISA TR MAX VEL: 2.4 M/S
PULM VEIN A" WAVE DURATION": 74 MSEC
PV PEAK GRADIENT: 1.23 MMHG
PV PEAK VELOCITY: 0.55 CM/S
TDI LATERAL: 0.09
TDI SEPTAL: 0.07
TDI: 0.08
TR MAX PG: 23.04 MMHG
TRICUSPID ANNULAR PLANE SYSTOLIC EXCURSION: 0.03 CM

## 2018-10-10 NOTE — PLAN OF CARE
TIARA sent patient information to [facility] for resumption of home health services through [facility] system.TIARA Hernandez    · 10/10/2018 9:58:41 AM Accepted: Thanks SOC will be tomorrow  [email or web address]

## 2018-10-11 LAB
APTT PROTEIN C ACTIVATOR+FV DP/APTT PPP: 87 %
PROT S ACT/NOR PPP: 79 %

## 2018-10-17 ENCOUNTER — OFFICE VISIT (OUTPATIENT)
Dept: ORTHOPEDICS | Facility: CLINIC | Age: 53
End: 2018-10-17
Payer: COMMERCIAL

## 2018-10-17 ENCOUNTER — OFFICE VISIT (OUTPATIENT)
Dept: HEMATOLOGY/ONCOLOGY | Facility: CLINIC | Age: 53
End: 2018-10-17
Payer: COMMERCIAL

## 2018-10-17 VITALS
HEART RATE: 85 BPM | DIASTOLIC BLOOD PRESSURE: 89 MMHG | WEIGHT: 253 LBS | BODY MASS INDEX: 42.1 KG/M2 | SYSTOLIC BLOOD PRESSURE: 127 MMHG | RESPIRATION RATE: 20 BRPM | TEMPERATURE: 98 F

## 2018-10-17 VITALS
HEIGHT: 65 IN | BODY MASS INDEX: 39.82 KG/M2 | DIASTOLIC BLOOD PRESSURE: 80 MMHG | SYSTOLIC BLOOD PRESSURE: 128 MMHG | WEIGHT: 239 LBS

## 2018-10-17 DIAGNOSIS — D64.9 NORMOCHROMIC NORMOCYTIC ANEMIA: ICD-10-CM

## 2018-10-17 DIAGNOSIS — I26.99 OTHER PULMONARY EMBOLISM WITHOUT ACUTE COR PULMONALE, UNSPECIFIED CHRONICITY: Primary | ICD-10-CM

## 2018-10-17 DIAGNOSIS — Z96.651 S/P TOTAL KNEE ARTHROPLASTY, RIGHT: Primary | ICD-10-CM

## 2018-10-17 DIAGNOSIS — R91.1 NODULE OF LEFT LUNG: ICD-10-CM

## 2018-10-17 PROCEDURE — 1111F DSCHRG MED/CURRENT MED MERGE: CPT | Mod: ,,, | Performed by: INTERNAL MEDICINE

## 2018-10-17 PROCEDURE — 3079F DIAST BP 80-89 MM HG: CPT | Mod: ,,, | Performed by: INTERNAL MEDICINE

## 2018-10-17 PROCEDURE — 99024 POSTOP FOLLOW-UP VISIT: CPT | Mod: ,,, | Performed by: ORTHOPAEDIC SURGERY

## 2018-10-17 PROCEDURE — 3074F SYST BP LT 130 MM HG: CPT | Mod: ,,, | Performed by: INTERNAL MEDICINE

## 2018-10-17 PROCEDURE — 99203 OFFICE O/P NEW LOW 30 MIN: CPT | Mod: ,,, | Performed by: INTERNAL MEDICINE

## 2018-10-17 PROCEDURE — 73560 X-RAY EXAM OF KNEE 1 OR 2: CPT | Mod: RT,,, | Performed by: ORTHOPAEDIC SURGERY

## 2018-10-17 PROCEDURE — 3008F BODY MASS INDEX DOCD: CPT | Mod: ,,, | Performed by: INTERNAL MEDICINE

## 2018-10-17 RX ORDER — ZOLPIDEM TARTRATE 5 MG/1
TABLET ORAL
COMMUNITY
Start: 2018-10-10 | End: 2018-12-26 | Stop reason: SDUPTHER

## 2018-10-17 RX ORDER — MEDROXYPROGESTERONE ACETATE 150 MG/ML
INJECTION, SUSPENSION INTRAMUSCULAR
COMMUNITY
Start: 2018-08-24 | End: 2018-10-17

## 2018-10-17 RX ORDER — OXYCODONE AND ACETAMINOPHEN 10; 325 MG/1; MG/1
TABLET ORAL
COMMUNITY
End: 2018-10-17 | Stop reason: SDUPTHER

## 2018-10-17 RX ORDER — OXYCODONE AND ACETAMINOPHEN 10; 325 MG/1; MG/1
1 TABLET ORAL EVERY 4 HOURS PRN
Qty: 42 TABLET | Refills: 0 | Status: SHIPPED | OUTPATIENT
Start: 2018-10-17 | End: 2018-10-24

## 2018-10-17 NOTE — LETTER
October 17, 2018      Prashanth Cabrera NP  [address]  [place] [address]           [facility]ematology [facility]  [address] [facility]  [address]  [place] [address]  Phone: [phone]  Fax: [phone]          Patient: Carola Blanco   MR Number: [id number]   YOB: 1965   Date of Visit: 10/17/2018       Dear Prashanth Cabrera:    Thank you for referring Carola Blanco to me for evaluation. Attached you will find relevant portions of my assessment and plan of care.    If you have questions, please do not hesitate to call me. I look forward to following Carola Blanco along with you.    Sincerely,    You Hansen MD    Enclosure  CC:  No Recipients    If you would like to receive this communication electronically, please contact [email or web address] or [phone] to request more information on [facility] Link access.    For providers and/or their staff who would like to refer a patient to [facility], please contact us through our one-stop-shop provider referral line, [facility], at [phone].    If you feel you have received this communication in error or would no longer like to receive these types of communications, please e-mail [email or web address]

## 2018-10-17 NOTE — PROGRESS NOTES
Subjective:    Patient ID: Carola Blanco is a 52 y.o. female.    Chief Complaint: Post-op Evaluation of the Right Knee ( Right TKA 10.4.18. She is still having pain and stiffness.)      History of Present Illness  Patient is here for her two-week postoperative appointment. Struggling with postop pain and mobility this time around. Also she developed DVTs and bilateral pulmonary emboli postoperatively.    Current Medications  Current Outpatient Medications   Medication Sig Dispense Refill    acetaminophen (TYLENOL) 325 MG tablet Take 1 tablet (325 mg total) by mouth 2 (two) times daily.  0    apixaban (ELIQUIS) 5 mg Tab Take 1 tablet (5 mg total) by mouth 2 (two) times daily.      docusate sodium (COLACE) 100 MG capsule Take 1 capsule (100 mg total) by mouth once daily.  0    lisinopril 10 MG tablet Take 1 tablet (10 mg total) by mouth every evening. 30 tablet 0    metronidazole 1% (METROGEL) 1 % Gel Apply topically once daily. 1 Tube 0    oxyCODONE-acetaminophen (PERCOCET)  mg per tablet Take 1 tablet by mouth every 4 (four) hours as needed for Pain.  0    zolpidem (AMBIEN) 5 MG Tab       aspirin 325 MG tablet Take 1 tablet (325 mg total) by mouth once daily.  0    diclofenac sodium (VOLTAREN) 1 % Gel Apply 4 g topically 4 (four) times daily. 100 g 0    traZODone (DESYREL) 50 MG tablet Take 2 tablets (100 mg total) by mouth every evening. 1-2 tablets by mouth daily at bedtime when necessary insomnia 60 tablet 1     No current facility-administered medications for this visit.        Allergies  Review of patient's allergies indicates:  No Known Allergies    Past Medical History  Past Medical History:   Diagnosis Date    Arthritis     Hypertension        Surgical History  Past Surgical History:   Procedure Laterality Date    ARTHROPLASTY, KNEE Right 10/4/2018    [facility]formed [facility] at [facility] OR    ARTHROPLASTY-KNEE Left 4/19/2018    [facility] by [facility] at [facility] OR    JOINT  REPLACEMENT      KNEE ARTHROPLASTY Right 10/4/2018    Procedure: ARTHROPLASTY, KNEE;  Surgeon: Markie Bang MD;  Location: [facility];  Service: Orthopedics;  Laterality: Right;    KNEE SURGERY Left 04/19/2018       Family History:   Family History   Problem Relation Age of Onset    Cancer Mother         breast    Arthritis Mother     Birth defects Mother     Cancer Father         lung    Arthritis Maternal Grandmother        Social History:   Social History     Socioeconomic History    Marital status:      Spouse name: Not on file    Number of children: Not on file    Years of education: Not on file    Highest education level: Not on file   Social Needs    Financial resource strain: Not on file    Food insecurity - worry: Not on file    Food insecurity - inability: Not on file    Transportation needs - medical: Not on file    Transportation needs - non-medical: Not on file   Occupational History    Not on file   Tobacco Use    Smoking status: Never Smoker    Smokeless tobacco: Never Used   Substance and Sexual Activity    Alcohol use: No    Drug use: No    Sexual activity: Yes     Birth control/protection: Injection   Other Topics Concern    Not on file   Social History Narrative    Not on file       Date of surgery: 10/04/2018    Review of Systems     General/Constitutional: Chills denies. Fatigue denies. Fever denies. Weight gain denies. Weight loss denies.    Musculoskeletal: Comments: See HPI for details    Skin: Rash denies.    Objective:   Vital Signs:   Vitals:    10/17/18 0951   BP: 128/80        Physical Exam    This a well-developed, well nourished patient in no acute distress.  They are alert and oriented and cooperative to examination.  Pt. walks without an antalgic gait.      General Examination:     Constitutional: The patient is alert and oriented to lace person and time. Mood is pleasant.     Head/Face: Normal facial features normal eyebrows    Eyes: Normal  "extraocular motion bilaterally    Lungs: Respirations are equal and unlabored    Gait is coordinated.    Cardiovascular: There are no swelling or varicosities present.    Lymphatic: Negative for adenopathy    Skin: Normal    Neurological: Level of consciousness normal. Oriented to place person and time and situation    Psychiatric: Oriented to time place person and situation    Right knee exam: Antalgic gait with a rolling walker. Incision clean and dry and healing well: Staples removed today and Steri-Strips placed. Range of motion flexion is limited secondary to pain and guarding. Extension within normal limitations.    XRAY Report/ Interpretation: Right knee x-rays taken in the office today and include AP and lateral views demonstrate a normal postoperative appearance      Assessment:       1. S/P total knee arthroplasty, right        Plan:       Carola was seen today for post-op evaluation.    Diagnoses and all orders for this visit:    S/P total knee arthroplasty, right  -     X-Ray Knee 1 or 2 View Right         No Follow-up on file.  Jose Armando Josue, physician's assistant served in the capacity as a "scribe" for this patient encounter  A "face to face" encounter occurred with Dr. Bang on this date  The treatment plan and medical decision making is outlined below:  Continue with home health. Follow-up in 6 weeks. Pain medication refilled. Follow-up with hematologist as scheduled. Continue with KYLAH hose and Eloquis      This note was created using Dragon voice recognition software that occasionally misinterpreted phrases or words.               "

## 2018-10-17 NOTE — PROGRESS NOTES
[facility] Hematolgy/Oncology  History & Physical    Subjective:      Patient ID:   NAME: Carola Blanco : 1965     52 y.o. female    Referring Doc: Prashanth Cabrera  Other Physicians: Markie Bang        Chief Complaint: pulmonary emboli    HPI:  52 y.o. female with diagnosis of bilateral pulmonary emboli who has been referred by Prashanth Cabrera for evaluation by medical hematology. Patient was recently hospitalized in earlier Oct 2018 at [facility] with new onset pulmonary emboli. She had had a prior right knee replacement surgery 2 days prior to admission. CTA showed bilateral pulmonary emboli with moderate clot burden and evidence of right heart strain. She also was found to have an incidental left lung nodule. She was placed on Eliquis. She is here with her daughter Fani, who works [facility] at [facility], and her . She had prior left knee done in 2018 which did fine. She is in wheelchair. Mom had breast cancer. Dad had lung cancer.               ROS:   GEN: normal without any fever, night sweats or weight loss  HEENT: normal with no HA's, sore throat, stiff neck, changes in vision  CV: normal with no CP, SOB, PND, LUNA or orthopnea  PULM: normal with no SOB, cough, hemoptysis, sputum or pleuritic pain  GI: normal with no abdominal pain, nausea, vomiting, constipation, diarrhea, melanotic stools, BRBPR, or hematemesis  : normal with no hematuria, dysuria  BREAST: normal with no mass, discharge, pain  SKIN: normal with no rash, erythema, bruising, or swelling       Past Medical/Surgical History:  Past Medical History:   Diagnosis Date    Arthritis     Hypertension     Nodule of left lung 10/17/2018    Normochromic normocytic anemia 10/17/2018     Past Surgical History:   Procedure Laterality Date    ARTHROPLASTY, KNEE Right 10/4/2018    [facility] OR    ARTHROPLASTY-KNEE Left 2018    [facility] by [facility] at [facility] OR    JOINT REPLACEMENT       KNEE ARTHROPLASTY Right 10/4/2018    Procedure: ARTHROPLASTY, KNEE;  Surgeon: Markie Bang MD;  Location: [facility];  Service: Orthopedics;  Laterality: Right;    KNEE SURGERY Left 04/19/2018         Allergies:  Review of patient's allergies indicates:  No Known Allergies    Social/Family History:  Social History     Socioeconomic History    Marital status:      Spouse name: Not on file    Number of children: Not on file    Years of education: Not on file    Highest education level: Not on file   Social Needs    Financial resource strain: Not on file    Food insecurity - worry: Not on file    Food insecurity - inability: Not on file    Transportation needs - medical: Not on file    Transportation needs - non-medical: Not on file   Occupational History    Not on file   Tobacco Use    Smoking status: Never Smoker    Smokeless tobacco: Never Used   Substance and Sexual Activity    Alcohol use: No    Drug use: No    Sexual activity: Yes     Birth control/protection: Injection   Other Topics Concern    Not on file   Social History Narrative    Not on file     Family History   Problem Relation Age of Onset    Cancer Mother         breast    Arthritis Mother     Birth defects Mother     Cancer Father         lung    Arthritis Maternal Grandmother          Medications:    Current Outpatient Medications:     acetaminophen (TYLENOL) 325 MG tablet, Take 1 tablet (325 mg total) by mouth 2 (two) times daily., Disp: , Rfl: 0    apixaban (ELIQUIS) 5 mg Tab, Take 1 tablet (5 mg total) by mouth 2 (two) times daily., Disp: , Rfl:     aspirin 325 MG tablet, Take 1 tablet (325 mg total) by mouth once daily., Disp: , Rfl: 0    docusate sodium (COLACE) 100 MG capsule, Take 1 capsule (100 mg total) by mouth once daily., Disp: , Rfl: 0    lisinopril 10 MG tablet, Take 1 tablet (10 mg total) by mouth every evening., Disp: 30 tablet, Rfl: 0    metronidazole 1% (METROGEL) 1 % Gel, Apply topically  once daily., Disp: 1 Tube, Rfl: 0    oxyCODONE-acetaminophen (PERCOCET)  mg per tablet, Take 1 tablet by mouth every 4 (four) hours as needed for Pain., Disp: 42 tablet, Rfl: 0    zolpidem (AMBIEN) 5 MG Tab, , Disp: , Rfl:     diclofenac sodium (VOLTAREN) 1 % Gel, Apply 4 g topically 4 (four) times daily., Disp: 100 g, Rfl: 0    traZODone (DESYREL) 50 MG tablet, Take 2 tablets (100 mg total) by mouth every evening. 1-2 tablets by mouth daily at bedtime when necessary insomnia, Disp: 60 tablet, Rfl: 1      Pathology:  Cancer Staging  No matching staging information was found for the patient.      Objective:   Vitals:  Blood pressure 127/89, pulse 85, temperature 98.4 °F (36.9 °C), resp. rate 20, weight 114.8 kg (253 lb).    Physical Examination:   GEN: no apparent distress, comfortable; AAOx3  HEAD: atraumatic and normocephalic  EYES: no pallor, no icterus, PERRLA  ENT: OMM, no pharyngeal erythema, external ears WNL; no nasal discharge; no thrush  NECK: no masses, thyroid normal, trachea midline, no LAD/LN's, supple  CV: RRR with no murmur; normal pulse; normal S1 and S2; no pedal edema  CHEST: Normal respiratory effort; CTAB; normal breath sounds; no wheeze or crackles  ABDOM: nontender and nondistended; soft; normal bowel sounds; no rebound/guarding  MUSC/Skeletal: Right knee swollen still and limited mobility  EXTREM: no clubbing, cyanosis, inflammation or swelling  SKIN: no rashes, lesions, ulcers, petechiae or subcutaneous nodules  : no mckeon  NEURO: grossly intact; motor/sensory WNL; AAOx3; no tremors  PSYCH: normal mood, affect and behavior  LYMPH: normal cervical, supraclavicular, axillary and groin LN's      Labs:   Lab Results   Component Value Date    WBC 8.70 10/09/2018    HGB 11.0 (L) 10/09/2018    HCT 33.3 (L) 10/09/2018    MCV 82 10/09/2018     10/09/2018    CMP  Sodium   Date Value Ref Range Status   10/09/2018 138 136 - 145 mmol/L Final     Potassium   Date Value Ref Range Status    10/09/2018 4.1 3.5 - 5.1 mmol/L Final     Chloride   Date Value Ref Range Status   10/09/2018 104 95 - 110 mmol/L Final     CO2   Date Value Ref Range Status   10/09/2018 26 23 - 29 mmol/L Final     Glucose   Date Value Ref Range Status   10/09/2018 104 70 - 110 mg/dL Final     BUN, Bld   Date Value Ref Range Status   10/09/2018 16 6 - 20 mg/dL Final     Creatinine   Date Value Ref Range Status   10/09/2018 0.8 0.5 - 1.4 mg/dL Final     Calcium   Date Value Ref Range Status   10/09/2018 9.2 8.7 - 10.5 mg/dL Final     Total Protein   Date Value Ref Range Status   10/06/2018 5.8 (L) 6.0 - 8.4 g/dL Final     Albumin   Date Value Ref Range Status   10/06/2018 2.7 (L) 3.5 - 5.2 g/dL Final     Total Bilirubin   Date Value Ref Range Status   10/06/2018 0.4 0.1 - 1.0 mg/dL Final     Comment:     For infants and newborns, interpretation of results should be based  on gestational age, weight and in agreement with clinical  observations.  Premature Infant recommended reference ranges:  Up to 24 hours.............<8.0 mg/dL  Up to 48 hours............<12.0 mg/dL  3-5 days..................<15.0 mg/dL  6-29 days.................<15.0 mg/dL       Alkaline Phosphatase   Date Value Ref Range Status   10/06/2018 31 (L) 55 - 135 U/L Final     AST   Date Value Ref Range Status   10/06/2018 30 10 - 40 U/L Final     ALT   Date Value Ref Range Status   10/06/2018 26 10 - 44 U/L Final     Anion Gap   Date Value Ref Range Status   10/09/2018 8 8 - 16 mmol/L Final     eGFR if    Date Value Ref Range Status   10/09/2018 >60 >60 mL/min/1.73 m^2 Final     eGFR if non    Date Value Ref Range Status   10/09/2018 >60 >60 mL/min/1.73 m^2 Final     Comment:     Calculation used to obtain the estimated glomerular filtration  rate (eGFR) is the CKD-EPI equation.            Radiology/Diagnostic Studies:    Doppler US  10/8/2018:    Impression       No evidence of deep venous thrombosis in either lower extremity.            CTA chest  10/6/2018:    Impression       1. Bilateral pulmonary emboli with moderate clot burden and evidence of right heart strain.  2. Mosaic attenuation in the lungs suggestive of mild pulmonary edema.  3. Cardiomegaly.  4. Left lung pulmonary nodule.                 All lab results and imaging results have been reviewed and discussed with the patient    Assessment:   (1) 52 y.o. female with diagnosis of bilateral pulmonary emboli who has been referred by Prashanth Cabrera for evaluation by medical hematology.   - Patient was recently hospitalized in earlier Oct 2018 at [facility] with new onset pulmonary emboli.   - She had had a prior right knee replacement surgery 2 days prior to admission.  -  CTA showed bilateral pulmonary emboli with moderate clot burden and evidence of right heart strain.     (2) HTN    (3) Left lung nodule - 4mm - too small to probably biopsy - FAY    (4) Family history of lung cancer    (5) Mild anemia with hgb at 11.0 - NCNC parameters            Plan:       Other pulmonary embolism without acute cor pulmonale, unspecified chronicity    Normochromic normocytic anemia    Nodule of left lung            PLAN:  1. Order clot workup  2. Refer to pulmonary for lung nodule evaluation and pulm emboli  3. Check iron studies and vitamin panel, stool OBS  4. F/u with PCP, ortho,etc  RTC in  4 weeks   Fax note to Prashanth Cabrera, ERIKA; Boone Bang        I have explained and the patient understands all of  the current recommendation(s). I have answered all of their questions to the best of my ability and to their complete satisfaction.             Thank you for allowing me to participate in this patient's care. Please call with any questions or concerns.    Electronically signed You Hansen MD

## 2018-10-23 LAB
AT III ACT/NOR PPP CHRO: 155 % NORMAL (ref 80–120)
AT III AG PPP IA-MCNC: NORMAL MG/DL
CARDIOLIPIN IGM SER IA-ACNC: <12 MPL
DRVVT CONFIRM PPP QL: NEGATIVE
F2 C.20210G>A GENO BLD/T: NORMAL
FACT IX ACT/NOR PPP: 137 % NORMAL (ref 60–160)
FACT VII ACT/NOR PPP: 108 % NORMAL (ref 60–150)
FACT VIII ACT/NOR PPP: 91 % NORMAL (ref 50–180)
FERRITIN SERPL-MCNC: 79 NG/ML (ref 10–232)
FOLATE SERPL-MCNC: 15.6 NG/ML
HCYS SERPL-SCNC: 14.4 UMOL/L
INTERPRETATION: NORMAL
IRON SATN MFR SERPL: 11 % (CALC) (ref 11–50)
IRON SERPL-MCNC: 37 MCG/DL (ref 45–160)
MTHFR GENE MUT ANL BLD/T: NORMAL
PROT C ACT/NOR PPP: 170 % NORMAL (ref 70–180)
PROT C AG ACT/NOR PPP IA: NORMAL % NORMAL
PROT S ACT/NOR PPP: 97 % NORMAL (ref 60–140)
PROT S AG ACT/NOR PPP IA: NORMAL % NORMAL
SCREEN DRVVT: 46 SECONDS
SERVICE CMNT-IMP: NORMAL
TIBC SERPL-MCNC: 344 MCG/DL (CALC) (ref 250–450)
VIT B12 SERPL-MCNC: 313 PG/ML (ref 200–1100)

## 2018-10-25 DIAGNOSIS — Z96.651 S/P TOTAL KNEE ARTHROPLASTY, RIGHT: Primary | ICD-10-CM

## 2018-10-25 NOTE — TELEPHONE ENCOUNTER
----- Message from Thao Canales sent at 10/25/2018 10:28 AM CDT -----  (dr bishop) patient needs a rx for her pain meds oxycodone 10mg pts# [phone]

## 2018-10-26 RX ORDER — OXYCODONE AND ACETAMINOPHEN 10; 325 MG/1; MG/1
1 TABLET ORAL EVERY 6 HOURS PRN
Qty: 30 TABLET | Refills: 0 | Status: SHIPPED | OUTPATIENT
Start: 2018-10-26 | End: 2018-11-02

## 2018-10-31 DIAGNOSIS — T84.84XD PAIN DUE TO TOTAL RIGHT KNEE REPLACEMENT, SUBSEQUENT ENCOUNTER: Primary | ICD-10-CM

## 2018-10-31 DIAGNOSIS — Z96.651 PAIN DUE TO TOTAL RIGHT KNEE REPLACEMENT, SUBSEQUENT ENCOUNTER: Primary | ICD-10-CM

## 2018-10-31 RX ORDER — OXYCODONE AND ACETAMINOPHEN 7.5; 325 MG/1; MG/1
1 TABLET ORAL EVERY 6 HOURS PRN
Qty: 28 TABLET | Refills: 0 | Status: SHIPPED | OUTPATIENT
Start: 2018-10-31 | End: 2018-11-07

## 2018-10-31 NOTE — TELEPHONE ENCOUNTER
----- Message from Thao Canales sent at 10/31/2018  9:18 AM CDT -----  (dr bishop) patient needs a rx for oxycodone 10 pts# [phone]

## 2018-11-04 LAB — HEMOCCULT STL QL IA: NORMAL

## 2018-11-09 RX ORDER — OXYCODONE AND ACETAMINOPHEN 10; 325 MG/1; MG/1
1 TABLET ORAL EVERY 6 HOURS PRN
Qty: 28 TABLET | Refills: 0 | Status: SHIPPED | OUTPATIENT
Start: 2018-11-09 | End: 2018-11-16

## 2018-11-09 NOTE — TELEPHONE ENCOUNTER
----- Message from Theresa Brennan sent at 11/8/2018 10:14 AM CST -----  Contact: Pt called [phone] Dr Bang  Requesting Oxycodone 10 mg.

## 2018-11-12 ENCOUNTER — TELEPHONE (OUTPATIENT)
Dept: ORTHOPEDICS | Facility: CLINIC | Age: 53
End: 2018-11-12

## 2018-11-12 NOTE — TELEPHONE ENCOUNTER
----- Message from Mery Marcus sent at 11/12/2018  2:51 PM CST -----  Felix stewart/[facility] splint needs OP report and office visit note faxed to [phone].  Phone number [phone]

## 2018-11-19 RX ORDER — OXYCODONE AND ACETAMINOPHEN 5; 325 MG/1; MG/1
1 TABLET ORAL EVERY 6 HOURS PRN
Qty: 28 TABLET | Refills: 0 | Status: SHIPPED | OUTPATIENT
Start: 2018-11-19 | End: 2018-11-26

## 2018-11-19 NOTE — TELEPHONE ENCOUNTER
----- Message from Mery Marcus sent at 11/19/2018 12:16 PM CST -----  Patient needs refill on Oxycodine 10mg.  Please call when ready [phone]

## 2018-11-26 RX ORDER — HYDROCODONE BITARTRATE AND ACETAMINOPHEN 7.5; 325 MG/1; MG/1
1 TABLET ORAL EVERY 6 HOURS PRN
Qty: 28 TABLET | Refills: 0 | Status: SHIPPED | OUTPATIENT
Start: 2018-11-26 | End: 2018-12-03

## 2018-11-28 ENCOUNTER — OFFICE VISIT (OUTPATIENT)
Dept: ORTHOPEDICS | Facility: CLINIC | Age: 53
End: 2018-11-28
Payer: COMMERCIAL

## 2018-11-28 VITALS
BODY MASS INDEX: 39.99 KG/M2 | DIASTOLIC BLOOD PRESSURE: 80 MMHG | HEIGHT: 65 IN | HEART RATE: 93 BPM | SYSTOLIC BLOOD PRESSURE: 150 MMHG | WEIGHT: 240 LBS

## 2018-11-28 DIAGNOSIS — M24.561 CONTRACTURE OF RIGHT KNEE: ICD-10-CM

## 2018-11-28 DIAGNOSIS — Z96.651 S/P TOTAL KNEE ARTHROPLASTY, RIGHT: Primary | ICD-10-CM

## 2018-11-28 PROCEDURE — 99024 POSTOP FOLLOW-UP VISIT: CPT | Mod: ,,, | Performed by: ORTHOPAEDIC SURGERY

## 2018-11-28 RX ORDER — OXYCODONE AND ACETAMINOPHEN 7.5; 325 MG/1; MG/1
TABLET ORAL
COMMUNITY
End: 2018-11-28

## 2018-11-28 RX ORDER — CIPROFLOXACIN 500 MG/1
TABLET ORAL
Refills: 1 | COMMUNITY
Start: 2018-10-26 | End: 2023-03-21

## 2018-11-28 RX ORDER — NAPROXEN 500 MG/1
TABLET ORAL
COMMUNITY
Start: 2018-10-28 | End: 2019-10-16 | Stop reason: SDUPTHER

## 2018-11-28 NOTE — PROGRESS NOTES
Subjective:       Patient ID: Carola Blanco is a 53 y.o. female.    Chief Complaint: Pain of the Right Knee (Right TKA 10-4-18. She does have some trouble bending it. She is now at [facility] and that is helping)      History of Present Illness  Patient is here almost 2 months status post right total knee arthroplasty. She has had some persistent right knee pain and some very limited active and passive flexion. She recently changed from 1 physical therapy facility to another and this is helped. She states that she has increased her knee flexion from 60° to 70° and about a week or so with this knee physical therapy group. She is also has some persistent postoperative pain but we have been decreasing her medications as indicated    Current Medications  Current Outpatient Medications   Medication Sig Dispense Refill    apixaban (ELIQUIS) 5 mg Tab Take 1 tablet (5 mg total) by mouth 2 (two) times daily. 60 tablet 4    aspirin 325 MG tablet Take 1 tablet (325 mg total) by mouth once daily.  0    ciprofloxacin HCl (CIPRO) 500 MG tablet TK 1 T PO BID  1    docusate sodium (COLACE) 100 MG capsule Take 1 capsule (100 mg total) by mouth once daily.  0    HYDROcodone-acetaminophen (NORCO) 7.5-325 mg per tablet Take 1 tablet by mouth every 6 (six) hours as needed for Pain. 28 tablet 0    lisinopril 10 MG tablet Take 1 tablet (10 mg total) by mouth every evening. 30 tablet 0    metronidazole 1% (METROGEL) 1 % Gel Apply topically once daily. 1 Tube 0    naproxen (NAPROSYN) 500 MG tablet       zolpidem (AMBIEN) 5 MG Tab       diclofenac sodium (VOLTAREN) 1 % Gel Apply 4 g topically 4 (four) times daily. 100 g 0    traZODone (DESYREL) 50 MG tablet Take 2 tablets (100 mg total) by mouth every evening. 1-2 tablets by mouth daily at bedtime when necessary insomnia 60 tablet 1     No current facility-administered medications for this visit.        Allergies  Review of patient's allergies indicates:  No Known  Allergies    Past Medical History  Past Medical History:   Diagnosis Date    Arthritis     Hypertension     Nodule of left lung 10/17/2018    Normochromic normocytic anemia 10/17/2018       Surgical History  Past Surgical History:   Procedure Laterality Date    ARTHROPLASTY, KNEE Right 10/4/2018    Performed [facility] Markie Bang MD at [facility] OR    ARTHROPLASTY-KNEE Left 4/19/2018    Performed by Markie Bang MD at [facility] OR    JOINT REPLACEMENT      KNEE ARTHROPLASTY Right 10/4/2018    Procedure: ARTHROPLASTY, KNEE;  Surgeon: Markie Bang MD;  Location: [facility] OR;  Service: Orthopedics;  Laterality: Right;    KNEE SURGERY Left 04/19/2018       Family History:   Family History   Problem Relation Age of Onset    Cancer Mother         breast    Arthritis Mother     Birth defects Mother     Cancer Father         lung    Arthritis Maternal Grandmother        Social History:   Social History     Socioeconomic History    Marital status:      Spouse name: Not on file    Number of children: Not on file    Years of education: Not on file    Highest education level: Not on file   Social Needs    Financial resource strain: Not on file    Food insecurity - worry: Not on file    Food insecurity - inability: Not on file    Transportation needs - medical: Not on file    Transportation needs - non-medical: Not on file   Occupational History    Not on file   Tobacco Use    Smoking status: Never Smoker    Smokeless tobacco: Never Used   Substance and Sexual Activity    Alcohol use: No    Drug use: No    Sexual activity: Yes     Birth control/protection: Injection   Other Topics Concern    Not on file   Social History Narrative    Not on file       Hospitalization/Major Diagnostic Procedure:     Review of Systems     General/Constitutional:  Chills denies. Fatigue denies. Fever denies. Weight gain denies. Weight loss denies.    Respiratory:  Shortness of breath denies.    Cardiovascular:  Chest  "pain denies.    Gastrointestinal:  Constipation denies. Diarrhea denies. Nausea denies. Vomiting denies.     Hematology:  Easy bruising denies. Prolonged bleeding denies.     Genitourinary:  Frequent urination denies. Pain in lower back denies. Painful urination denies.     Musculoskeletal:  See HPI for details    Skin:  Rash denies.    Neurologic:  Dizziness denies. Gait abnormalities denies. Seizures denies. Tingling/Numbess denies.    Psychiatric:  Anxiety denies. Depressed mood denies.     Objective:   Vital Signs:   Vitals:    11/28/18 1519   BP: (!) 150/80   Pulse: 93        Physical Exam      General Examination:     Constitutional: The patient is alert and oriented to lace person and time. Mood is pleasant.     Head/Face: Normal facial features normal eyebrows    Eyes: Normal extraocular motion bilaterally    Lungs: Respirations are equal and unlabored    Gait is coordinated.    Cardiovascular: There are no swelling or varicosities present.    Lymphatic: Negative for adenopathy    Skin: Normal    Neurological: Level of consciousness normal. Oriented to place person and time and situation    Psychiatric: Oriented to time place person and situation    Right knee exam: Antalgic gait with a straight cane. Extension to neutral. Flexion to proximally 65° actively. Strength is good but she does have limited range of motion as stated    XRAY Report/ Interpretation: No new studies today      Assessment:       1. S/P total knee arthroplasty, right    2. Contracture of right knee        Plan:       Carola was seen today for pain.    Diagnoses and all orders for this visit:    S/P total knee arthroplasty, right    Contracture of right knee  -     ORTHOTIC DEVICE (DME)         Follow-up in about 6 weeks (around 1/9/2019) for P/O RT TKA .  Jose Armando Josue, physician's assistant served in the capacity as a "scribe" for this patient encounter  A "face to face" encounter occurred with Dr. Bang on this date  The treatment " plan and medical decision making is outlined below:  Continue with aggressive physical therapy focused on range of motion and functional mobility. We recommend a Dynasplint to be done as soon as possible for flexion. I would like to see her back in 6 weeks to assess how the Dynasplint is doing. We need to continue to decrease her narcotic usage    This note was created using Dragon voice recognition software that occasionally misinterpreted phrases or words.

## 2018-12-05 RX ORDER — HYDROCODONE BITARTRATE AND ACETAMINOPHEN 5; 325 MG/1; MG/1
1 TABLET ORAL EVERY 6 HOURS PRN
Qty: 28 TABLET | Refills: 0 | Status: SHIPPED | OUTPATIENT
Start: 2018-12-05 | End: 2018-12-12

## 2018-12-12 PROBLEM — R79.83 HOMOCYSTEINEMIA: Status: ACTIVE | Noted: 2018-12-12

## 2018-12-12 PROBLEM — Z15.89 COMPOUND HETEROZYGOUS MTHFR MUTATION C677T/A1298C: Status: ACTIVE | Noted: 2018-12-12

## 2018-12-12 RX ORDER — HYDROCODONE BITARTRATE AND ACETAMINOPHEN 5; 325 MG/1; MG/1
1 TABLET ORAL EVERY 6 HOURS PRN
Qty: 28 TABLET | Refills: 0 | Status: SHIPPED | OUTPATIENT
Start: 2018-12-12 | End: 2018-12-26 | Stop reason: SDUPTHER

## 2018-12-12 NOTE — PROGRESS NOTES
[facility] Hematology/Oncology  PROGRESS NOTE - 2nd Follow-up Visit      Subjective:       Patient ID:   NAME: Carola Blanco : 1965     53 y.o. female    Referring Doc: Prashanth Cabrera  Other Physicians: Beti    Chief Complaint:  PE f/u    History of Present Illness:     Patient returns today for a 2nd regularly scheduled follow-up visit.  The patient is here today to go over the results of the recently ordered labs, tests and studies. Her leg is better but still stiff. She denies any  CP. SOB, HA's or N/V. She is currently on eliquis and she denies any bleeding or bruising. She is here by herself. We discussed the genetic implications in her children with the MTHFR.             ROS:   GEN: normal without any fever, night sweats or weight loss  HEENT: normal with no HA's, sore throat, stiff neck, changes in vision  CV: normal with no CP, SOB, PND, LUNA or orthopnea  PULM: normal with no SOB, cough, hemoptysis, sputum or pleuritic pain  GI: normal with no abdominal pain, nausea, vomiting, constipation, diarrhea, melanotic stools, BRBPR, or hematemesis  : normal with no hematuria, dysuria  BREAST: normal with no mass, discharge, pain  SKIN: normal with no rash, erythema, bruising, or swelling    Allergies:  Review of patient's allergies indicates:  No Known Allergies    Medications:    Current Outpatient Medications:     apixaban (ELIQUIS) 5 mg Tab, Take 1 tablet (5 mg total) by mouth 2 (two) times daily., Disp: 60 tablet, Rfl: 4    aspirin 325 MG tablet, Take 1 tablet (325 mg total) by mouth once daily., Disp: , Rfl: 0    ciprofloxacin HCl (CIPRO) 500 MG tablet, TK 1 T PO BID, Disp: , Rfl: 1    docusate sodium (COLACE) 100 MG capsule, Take 1 capsule (100 mg total) by mouth once daily., Disp: , Rfl: 0    HYDROcodone-acetaminophen (NORCO) 5-325 mg per tablet, Take 1 tablet by mouth every 6 (six) hours as needed for Pain., Disp: 28 tablet, Rfl: 0    lisinopril 10 MG tablet, Take 1 tablet (10 mg total) by mouth  every evening., Disp: 30 tablet, Rfl: 0    metronidazole 1% (METROGEL) 1 % Gel, Apply topically once daily., Disp: 1 Tube, Rfl: 0    naproxen (NAPROSYN) 500 MG tablet, , Disp: , Rfl:     zolpidem (AMBIEN) 5 MG Tab, , Disp: , Rfl:     diclofenac sodium (VOLTAREN) 1 % Gel, Apply 4 g topically 4 (four) times daily., Disp: 100 g, Rfl: 0    traZODone (DESYREL) 50 MG tablet, Take 2 tablets (100 mg total) by mouth every evening. 1-2 tablets by mouth daily at bedtime when necessary insomnia, Disp: 60 tablet, Rfl: 1    PMHx/PSHx Updates:  See patient's last visit with me on 10/17/2018.  See H&P on 10/17/2018        Pathology:  Cancer Staging  No matching staging information was found for the patient.          Objective:     Vitals:  Blood pressure 130/84, pulse 83, temperature 98.3 °F (36.8 °C), resp. rate 20, weight 110.1 kg (242 lb 12.8 oz).    Physical Examination:   GEN: no apparent distress, comfortable; AAOx3  HEAD: atraumatic and normocephalic  EYES: no pallor, no icterus, PERRLA  ENT: OMM, no pharyngeal erythema, external ears WNL; no nasal discharge; no thrush  NECK: no masses, thyroid normal, trachea midline, no LAD/LN's, supple  CV: RRR with no murmur; normal pulse; normal S1 and S2; no pedal edema  CHEST: Normal respiratory effort; CTAB; normal breath sounds; no wheeze or crackles  ABDOM: nontender and nondistended; soft; normal bowel sounds; no rebound/guarding  MUSC/Skeletal: Right knee swollen still and limited mobility  EXTREM: no clubbing, cyanosis, inflammation or swelling  SKIN: no rashes, lesions, ulcers, petechiae or subcutaneous nodules  : no mckeon  NEURO: grossly intact; motor/sensory WNL; AAOx3; no tremors  PSYCH: normal mood, affect and behavior  LYMPH: normal cervical, supraclavicular, axillary and groin LN's            Labs:     Homocysteine, Cardiovascular <10.4 umol/L 14.4 Abnormally high       MTHFR SEE NOTE    Comment: RESULT:   POSITIVE FOR ONE COPY OF THE C677T VARIANT AND ONE COPY OF    THE Y0817B VARIANT          DRVVT Screen < OR = 45 seconds 46 Abnormally high     Lab Results   Component Value Date    IRON 37 (L) 10/17/2018    TIBC 344 10/17/2018    FERRITIN 79 10/17/2018           Radiology/Diagnostic Studies:    No results found.    I have reviewed all available lab results and radiology reports.    Assessment/Plan:   (1) 53 y.o. female with diagnosis of bilateral pulmonary emboli who has been referred by Prashanth Cabrera for evaluation by medical hematology.   - Patient was recently hospitalized in earlier Oct 2018 at [facility] with new onset pulmonary emboli.   - She had had a prior right knee replacement surgery 2 days prior to admission.  -  CTA showed bilateral pulmonary emboli with moderate clot burden and evidence of right heart strain.   - compound heterozygous positive for MTHFR-A and MTHFR-C with resultant hyperhomocysteinemia     (2) HTN     (3) Left lung nodule - 4mm - too small to probably biopsy - FAY     (4) Family history of lung cancer     (5) Mild anemia with hgb at 11.0 - NCNC parameters; mild iron deficiency              VISIT DIAGNOSES:      Other pulmonary embolism without acute cor pulmonale, unspecified chronicity    Normochromic normocytic anemia    Compound heterozygous MTHFR mutation [id number]/[id number]    Homocysteinemia          PLAN:  1. consideration for life long anticoagulation  2. Discussed the genetic implications  3. Add folbic to reduce the homocysteine level  4. F/u with ortho, PCP, etc  5. Continue iron supplements  RTC in 3-4 months  Fax note to Prashanth Cabrera, NP,     Discussion:       I spent over 25 mins of time with the patient. Reviewed results of the recently ordered labs, tests and studies; made directives with regards to the results. Over half of this time was spent couseling and coordinating care.    I have explained all of the above in detail and the patient understands all of the current recommendation(s). I have answered all of their  questions to the best of my ability and to their complete satisfaction.   The patient is to continue with the current management plan.            Electronically signed by You Hansen MD

## 2018-12-13 ENCOUNTER — OFFICE VISIT (OUTPATIENT)
Dept: HEMATOLOGY/ONCOLOGY | Facility: CLINIC | Age: 53
End: 2018-12-13
Payer: COMMERCIAL

## 2018-12-13 VITALS
TEMPERATURE: 98 F | SYSTOLIC BLOOD PRESSURE: 130 MMHG | WEIGHT: 242.81 LBS | RESPIRATION RATE: 20 BRPM | HEART RATE: 83 BPM | BODY MASS INDEX: 40.4 KG/M2 | DIASTOLIC BLOOD PRESSURE: 84 MMHG

## 2018-12-13 DIAGNOSIS — D64.9 NORMOCHROMIC NORMOCYTIC ANEMIA: ICD-10-CM

## 2018-12-13 DIAGNOSIS — R79.83 HOMOCYSTEINEMIA: ICD-10-CM

## 2018-12-13 DIAGNOSIS — Z15.89 COMPOUND HETEROZYGOUS MTHFR MUTATION C677T/A1298C: ICD-10-CM

## 2018-12-13 DIAGNOSIS — I26.99 OTHER PULMONARY EMBOLISM WITHOUT ACUTE COR PULMONALE, UNSPECIFIED CHRONICITY: Primary | ICD-10-CM

## 2018-12-13 PROCEDURE — 3079F DIAST BP 80-89 MM HG: CPT | Mod: ,,, | Performed by: INTERNAL MEDICINE

## 2018-12-13 PROCEDURE — 99215 OFFICE O/P EST HI 40 MIN: CPT | Mod: ,,, | Performed by: INTERNAL MEDICINE

## 2018-12-13 PROCEDURE — 3008F BODY MASS INDEX DOCD: CPT | Mod: ,,, | Performed by: INTERNAL MEDICINE

## 2018-12-13 PROCEDURE — 3075F SYST BP GE 130 - 139MM HG: CPT | Mod: ,,, | Performed by: INTERNAL MEDICINE

## 2018-12-13 NOTE — LETTER
December 13, 2018      Prashanth Cabrera NP  [address]  [place] [address]           [facility] - Hematology Oncology  [address] [facility]  [address]  [place] [address]  Phone: [phone]  Fax: [phone]          Patient: Carola Blanco   MR Number: [id number]   YOB: 1965   Date of Visit: 12/13/2018       Dear Prashanth Cabrera:    Thank you for referring Carola Blanco to me for evaluation. Attached you will find relevant portions of my assessment and plan of care.    If you have questions, please do not hesitate to call me. I look forward to following Carola Blanco along with you.    Sincerely,    You Hansen MD    Enclosure  CC:  No Recipients    If you would like to receive this communication electronically, please contact [email or web address] or [phone] to request more information on [email or web address] Link access.    For providers and/or their staff who would like to refer a patient to [facility], please contact us through our one-stop-shop provider referral line, [facility], at [phone].    If you feel you have received this communication in error or would no longer like to receive these types of communications, please e-mail [email or web address]

## 2018-12-21 LAB
ALBUMIN SERPL-MCNC: 4 G/DL (ref 3.6–5.1)
ALBUMIN/GLOB SERPL: 1.3 (CALC) (ref 1–2.5)
ALP SERPL-CCNC: 34 U/L (ref 33–130)
ALT SERPL-CCNC: 11 U/L (ref 6–29)
AST SERPL-CCNC: 15 U/L (ref 10–35)
BASOPHILS # BLD AUTO: 42 CELLS/UL (ref 0–200)
BASOPHILS NFR BLD AUTO: 0.5 %
BILIRUB SERPL-MCNC: 0.3 MG/DL (ref 0.2–1.2)
BUN SERPL-MCNC: 11 MG/DL (ref 7–25)
BUN/CREAT SERPL: NORMAL (CALC) (ref 6–22)
CALCIUM SERPL-MCNC: 9.4 MG/DL (ref 8.6–10.4)
CHLORIDE SERPL-SCNC: 102 MMOL/L (ref 98–110)
CO2 SERPL-SCNC: 30 MMOL/L (ref 20–32)
CREAT SERPL-MCNC: 0.88 MG/DL (ref 0.5–1.05)
EOSINOPHIL # BLD AUTO: 202 CELLS/UL (ref 15–500)
EOSINOPHIL NFR BLD AUTO: 2.4 %
ERYTHROCYTE [DISTWIDTH] IN BLOOD BY AUTOMATED COUNT: 13.6 % (ref 11–15)
FERRITIN SERPL-MCNC: 34 NG/ML (ref 10–232)
GFR SERPL CREATININE-BSD FRML MDRD: 76 ML/MIN/1.73M2
GLOBULIN SER CALC-MCNC: 3.1 G/DL (CALC) (ref 1.9–3.7)
GLUCOSE SERPL-MCNC: 88 MG/DL (ref 65–139)
HCT VFR BLD AUTO: 40 % (ref 35–45)
HGB BLD-MCNC: 13.2 G/DL (ref 11.7–15.5)
IRON SATN MFR SERPL: 13 % (CALC) (ref 11–50)
IRON SERPL-MCNC: 45 MCG/DL (ref 45–160)
LYMPHOCYTES # BLD AUTO: 2932 CELLS/UL (ref 850–3900)
LYMPHOCYTES NFR BLD AUTO: 34.9 %
MCH RBC QN AUTO: 27.2 PG (ref 27–33)
MCHC RBC AUTO-ENTMCNC: 33 G/DL (ref 32–36)
MCV RBC AUTO: 82.3 FL (ref 80–100)
MONOCYTES # BLD AUTO: 706 CELLS/UL (ref 200–950)
MONOCYTES NFR BLD AUTO: 8.4 %
NEUTROPHILS # BLD AUTO: 4519 CELLS/UL (ref 1500–7800)
NEUTROPHILS NFR BLD AUTO: 53.8 %
PLATELET # BLD AUTO: 359 THOUSAND/UL (ref 140–400)
PMV BLD REES-ECKER: 10.1 FL (ref 7.5–12.5)
POTASSIUM SERPL-SCNC: 4 MMOL/L (ref 3.5–5.3)
PROT SERPL-MCNC: 7.1 G/DL (ref 6.1–8.1)
RBC # BLD AUTO: 4.86 MILLION/UL (ref 3.8–5.1)
SODIUM SERPL-SCNC: 140 MMOL/L (ref 135–146)
TIBC SERPL-MCNC: 342 MCG/DL (CALC) (ref 250–450)
WBC # BLD AUTO: 8.4 THOUSAND/UL (ref 3.8–10.8)

## 2018-12-26 RX ORDER — HYDROCODONE BITARTRATE AND ACETAMINOPHEN 5; 325 MG/1; MG/1
1 TABLET ORAL EVERY 6 HOURS PRN
Qty: 28 TABLET | Refills: 0 | Status: SHIPPED | OUTPATIENT
Start: 2018-12-26 | End: 2019-01-03 | Stop reason: SDUPTHER

## 2018-12-26 RX ORDER — ZOLPIDEM TARTRATE 5 MG/1
5 TABLET ORAL NIGHTLY
Qty: 30 TABLET | Refills: 3 | Status: SHIPPED | OUTPATIENT
Start: 2018-12-26 | End: 2019-04-16 | Stop reason: SDUPTHER

## 2019-01-03 RX ORDER — HYDROCODONE BITARTRATE AND ACETAMINOPHEN 5; 325 MG/1; MG/1
1 TABLET ORAL EVERY 6 HOURS PRN
Qty: 28 TABLET | Refills: 0 | Status: SHIPPED | OUTPATIENT
Start: 2019-01-03 | End: 2019-01-10

## 2019-01-03 NOTE — TELEPHONE ENCOUNTER
rx refilled and sent to dr finger to sign. Also advised patient that tomorrow she is 90 days p/o and by law we can no longer prescribe narcotics. She verbalized understanding.

## 2019-01-27 NOTE — PROGRESS NOTES
[facility] Hematology/Oncology  PROGRESS NOTE -  Follow-up Visit      Subjective:       Patient ID:   NAME: Carola Blanco : 1965     53 y.o. female    Referring Doc: Prashanth Cabrera  Other Physicians: Beti    Chief Complaint:  PE f/u    History of Present Illness:     Patient returns today for a 3rd regularly scheduled follow-up visit.  The patient is here today to go over the results of the recently ordered labs, tests and studies. She is breathing ok. She denies any  CP. SOB, HA's or N/V. She is currently on eliquis and she denies any bleeding or bruising. She is here by herself.              ROS:   GEN: normal without any fever, night sweats or weight loss  HEENT: normal with no HA's, sore throat, stiff neck, changes in vision  CV: normal with no CP, SOB, PND, LUNA or orthopnea  PULM: normal with no SOB, cough, hemoptysis, sputum or pleuritic pain  GI: normal with no abdominal pain, nausea, vomiting, constipation, diarrhea, melanotic stools, BRBPR, or hematemesis  : normal with no hematuria, dysuria  BREAST: normal with no mass, discharge, pain  SKIN: normal with no rash, erythema, bruising, or swelling    Allergies:  Review of patient's allergies indicates:  No Known Allergies    Medications:    Current Outpatient Medications:     apixaban (ELIQUIS) 5 mg Tab, Take 1 tablet (5 mg total) by mouth 2 (two) times daily., Disp: 60 tablet, Rfl: 4    aspirin 325 MG tablet, Take 1 tablet (325 mg total) by mouth once daily., Disp: , Rfl: 0    docusate sodium (COLACE) 100 MG capsule, Take 1 capsule (100 mg total) by mouth once daily., Disp: , Rfl: 0    folic acid-vit B6-vit B12 2.5-25-2 mg (FOLBIC OR EQUIV) 2.5-25-2 mg Tab, Take 1 tablet by mouth once daily., Disp: 30 tablet, Rfl: 6    ciprofloxacin HCl (CIPRO) 500 MG tablet, TK 1 T PO BID, Disp: , Rfl: 1    diclofenac sodium (VOLTAREN) 1 % Gel, Apply 4 g topically 4 (four) times daily., Disp: 100 g, Rfl: 0    lisinopril 10 MG tablet, Take 1 tablet (10 mg total) by  mouth every evening., Disp: 30 tablet, Rfl: 0    metronidazole 1% (METROGEL) 1 % Gel, Apply topically once daily., Disp: 1 Tube, Rfl: 0    naproxen (NAPROSYN) 500 MG tablet, , Disp: , Rfl:     traZODone (DESYREL) 50 MG tablet, Take 2 tablets (100 mg total) by mouth every evening. 1-2 tablets by mouth daily at bedtime when necessary insomnia, Disp: 60 tablet, Rfl: 1    zolpidem (AMBIEN) 5 MG Tab, Take 1 tablet (5 mg total) by mouth every evening., Disp: 30 tablet, Rfl: 3    PMHx/PSHx Updates:  See patient's last visit with me on 12/13/2018.  See H&P on 10/17/2018        Pathology:  Cancer Staging  No matching staging information was found for the patient.          Objective:     Vitals:  Blood pressure 125/83, pulse 89, temperature 98.5 °F (36.9 °C), resp. rate 20, weight 112.4 kg (247 lb 12.8 oz).    Physical Examination:   GEN: no apparent distress, comfortable; AAOx3  HEAD: atraumatic and normocephalic  EYES: no pallor, no icterus, PERRLA  ENT: OMM, no pharyngeal erythema, external ears WNL; no nasal discharge; no thrush  NECK: no masses, thyroid normal, trachea midline, no LAD/LN's, supple  CV: RRR with no murmur; normal pulse; normal S1 and S2; no pedal edema  CHEST: Normal respiratory effort; CTAB; normal breath sounds; no wheeze or crackles  ABDOM: nontender and nondistended; soft; normal bowel sounds; no rebound/guarding  MUSC/Skeletal: Right knee with some residual stiffness  EXTREM: no clubbing, cyanosis, inflammation or swelling  SKIN: no rashes, lesions, ulcers, petechiae or subcutaneous nodules  : no mckeon  NEURO: grossly intact; motor/sensory WNL; AAOx3; no tremors  PSYCH: normal mood, affect and behavior  LYMPH: normal cervical, supraclavicular, axillary and groin LN's            Labs:   12/20/2018    Lab Results   Component Value Date    WBC 8.4 12/20/2018    HGB 13.2 12/20/2018    HCT 40.0 12/20/2018    MCV 82.3 12/20/2018     12/20/2018     BMP  Lab Results   Component Value Date    NA  140 12/20/2018    K 4.0 12/20/2018     12/20/2018    CO2 30 12/20/2018    BUN 11 12/20/2018    CREATININE 0.88 12/20/2018    CALCIUM 9.4 12/20/2018    ANIONGAP 8 10/09/2018    ESTGFRAFRICA 88 12/20/2018    EGFRNONAA 76 12/20/2018     Lab Results   Component Value Date    ALT 11 12/20/2018    AST 15 12/20/2018    ALKPHOS 34 12/20/2018    BILITOT 0.3 12/20/2018         Lab Results   Component Value Date    IRON 45 12/20/2018    TIBC 342 12/20/2018    FERRITIN 34 12/20/2018           Radiology/Diagnostic Studies:    No results found.    I have reviewed all available lab results and radiology reports.    Assessment/Plan:   (1) 53 y.o. female with diagnosis of bilateral pulmonary emboli who has been referred by Prashanth Cabrera for evaluation by medical hematology.   - Patient was recently hospitalized in earlier Oct 2018 at [facility] with new onset pulmonary emboli.   - She had had a prior right knee replacement surgery 2 days prior to admission.  -  CTA showed bilateral pulmonary emboli with moderate clot burden and evidence of right heart strain.   - compound heterozygous positive for MTHFR-A and MTHFR-C with resultant hyperhomocysteinemia     (2) HTN     (3) Left lung nodule - 4mm - too small to probably biopsy - FAY     (4) Family history of lung cancer     (5) Mild anemia with hgb at 13.2 and better - NCNC parameters; mild iron deficiency              VISIT DIAGNOSES:      Normochromic normocytic anemia    Homocysteinemia    Compound heterozygous MTHFR mutation [id number]    Other pulmonary embolism without acute cor pulmonale, unspecified chronicity          PLAN:  1. consideration for life long anticoagulation  2. Previously Discussed the genetic implications  3. Continue use of folbic to reduce the homocysteine level  4. F/u with ortho, PCP, etc  5. Continue iron supplements  RTC in 3-4 months  Fax note to Prashanth Cabrera NP,     Discussion:       I spent over 25 mins of time with the patient.  Reviewed results of the recently ordered labs, tests and studies; made directives with regards to the results. Over half of this time was spent couseling and coordinating care.    I have explained all of the above in detail and the patient understands all of the current recommendation(s). I have answered all of their questions to the best of my ability and to their complete satisfaction.   The patient is to continue with the current management plan.            Electronically signed by You Hansen MD

## 2019-01-28 ENCOUNTER — OFFICE VISIT (OUTPATIENT)
Dept: HEMATOLOGY/ONCOLOGY | Facility: CLINIC | Age: 54
End: 2019-01-28
Payer: COMMERCIAL

## 2019-01-28 VITALS
SYSTOLIC BLOOD PRESSURE: 125 MMHG | RESPIRATION RATE: 20 BRPM | HEART RATE: 89 BPM | BODY MASS INDEX: 41.24 KG/M2 | DIASTOLIC BLOOD PRESSURE: 83 MMHG | TEMPERATURE: 99 F | WEIGHT: 247.81 LBS

## 2019-01-28 DIAGNOSIS — R79.83 HOMOCYSTEINEMIA: ICD-10-CM

## 2019-01-28 DIAGNOSIS — D64.9 NORMOCHROMIC NORMOCYTIC ANEMIA: Primary | ICD-10-CM

## 2019-01-28 DIAGNOSIS — Z15.89 COMPOUND HETEROZYGOUS MTHFR MUTATION C677T/A1298C: ICD-10-CM

## 2019-01-28 DIAGNOSIS — I26.99 OTHER PULMONARY EMBOLISM WITHOUT ACUTE COR PULMONALE, UNSPECIFIED CHRONICITY: ICD-10-CM

## 2019-01-28 PROCEDURE — 99213 OFFICE O/P EST LOW 20 MIN: CPT | Mod: ,,, | Performed by: INTERNAL MEDICINE

## 2019-01-28 PROCEDURE — 3074F PR MOST RECENT SYSTOLIC BLOOD PRESSURE < 130 MM HG: ICD-10-PCS | Mod: ,,, | Performed by: INTERNAL MEDICINE

## 2019-01-28 PROCEDURE — 3079F PR MOST RECENT DIASTOLIC BLOOD PRESSURE 80-89 MM HG: ICD-10-PCS | Mod: ,,, | Performed by: INTERNAL MEDICINE

## 2019-01-28 PROCEDURE — 3008F BODY MASS INDEX DOCD: CPT | Mod: ,,, | Performed by: INTERNAL MEDICINE

## 2019-01-28 PROCEDURE — 3074F SYST BP LT 130 MM HG: CPT | Mod: ,,, | Performed by: INTERNAL MEDICINE

## 2019-01-28 PROCEDURE — 3008F PR BODY MASS INDEX (BMI) DOCUMENTED: ICD-10-PCS | Mod: ,,, | Performed by: INTERNAL MEDICINE

## 2019-01-28 PROCEDURE — 99213 PR OFFICE/OUTPT VISIT, EST, LEVL III, 20-29 MIN: ICD-10-PCS | Mod: ,,, | Performed by: INTERNAL MEDICINE

## 2019-01-28 PROCEDURE — 3079F DIAST BP 80-89 MM HG: CPT | Mod: ,,, | Performed by: INTERNAL MEDICINE

## 2019-01-28 NOTE — LETTER
January 28, 2019      Prashanth Cabrera NP  [address]  [place] [address]           [facility] - Hematology Oncology  [address] [facility]  [address]  [place] [address]  Phone: [phone]  Fax: [phone]          Patient: Carola Blanco   MR Number: [id number]   YOB: 1965   Date of Visit: 1/28/2019       Dear Prashanth Cabrera:    Thank you for referring Carola Blanco to me for evaluation. Attached you will find relevant portions of my assessment and plan of care.    If you have questions, please do not hesitate to call me. I look forward to following Carola Blanco along with you.    Sincerely,    You Hansen MD    Enclosure  CC:  No Recipients    If you would like to receive this communication electronically, please contact [email or web address] or [phone] to request more information on [email or web address] Link access.    For providers and/or their staff who would like to refer a patient to [facility], please contact us through our one-stop-shop provider referral line, [facility], at [phone].    If you feel you have received this communication in error or would no longer like to receive these types of communications, please e-mail [email or web address]

## 2019-03-07 ENCOUNTER — TELEPHONE (OUTPATIENT)
Dept: ADMINISTRATIVE | Facility: CLINIC | Age: 54
End: 2019-03-07

## 2019-04-08 DIAGNOSIS — I26.99 OTHER PULMONARY EMBOLISM WITHOUT ACUTE COR PULMONALE, UNSPECIFIED CHRONICITY: ICD-10-CM

## 2019-04-08 RX ORDER — APIXABAN 5 MG/1
TABLET, FILM COATED ORAL
Qty: 60 TABLET | Refills: 4 | Status: SHIPPED | OUTPATIENT
Start: 2019-04-08 | End: 2023-03-21

## 2019-04-09 NOTE — TELEPHONE ENCOUNTER
Called patient with CTA results        ----- Message from Della Pretty sent at 4/9/2019  2:46 PM CDT -----  Pt had CTA  On 4/8/19  At [facility]  And would like results.  Please call pt and review results or advise if doctor has not reviewed them yet.    # [phone]      Thank you,  Della

## 2019-04-12 ENCOUNTER — TELEPHONE (OUTPATIENT)
Dept: HEMATOLOGY/ONCOLOGY | Facility: CLINIC | Age: 54
End: 2019-04-12

## 2019-04-12 NOTE — TELEPHONE ENCOUNTER
----- Message from You Hansen MD sent at 4/11/2019  8:36 AM CDT -----  I can't recommend that she discontinue the eliquis, but the CTA is now negative and it is her prerogative if she wants to self-discontinue. It would be with the understanding that she is at a higher risk for recurrent clot events (which could be fatal)        Spoke to Carola and explained the above to her. She voiced understanding and asked about getting a letter stating that the clot has resolved for her ins company. She said she doesn't mind continuing the eliquis but just needs the letter so that the company will re instate her life ins policy. Follow up here is mid May.

## 2019-04-16 NOTE — TELEPHONE ENCOUNTER
----- Message from Shakira Martines LPN sent at 4/16/2019 12:39 PM CDT -----  Requesting a refill on Ambien 5mg

## 2019-04-17 ENCOUNTER — TELEPHONE (OUTPATIENT)
Dept: HEMATOLOGY/ONCOLOGY | Facility: CLINIC | Age: 54
End: 2019-04-17

## 2019-04-17 RX ORDER — ZOLPIDEM TARTRATE 5 MG/1
5 TABLET ORAL NIGHTLY
Qty: 30 TABLET | Refills: 3 | Status: SHIPPED | OUTPATIENT
Start: 2019-04-17 | End: 2023-07-17

## 2019-04-17 NOTE — TELEPHONE ENCOUNTER
----- Message from Abril Talavera sent at 4/17/2019 10:55 AM CDT -----  Contact: patient  She was calling because [facility] supposedly sent a refill request for medicine, they do not have anything back from us, call her at [phone].

## 2019-11-04 ENCOUNTER — TELEPHONE (OUTPATIENT)
Dept: ORTHOPEDICS | Facility: CLINIC | Age: 54
End: 2019-11-04

## 2019-11-04 NOTE — TELEPHONE ENCOUNTER
Spoke with the patient. A denial for ambien was faxed back. She was advised to ask her primary dr about rx for ambien.

## 2019-11-04 NOTE — TELEPHONE ENCOUNTER
----- Message from Abril Talavera sent at 11/4/2019 10:09 AM CST -----  Contact: patient  She was calling to see if we received a prior authorization request from [employer] on her for her medication, call her back at [phone].

## 2019-11-11 NOTE — NURSING
Discharge instructions were given to pt and discussed in detail, pain medication RX given to pt. All personal belongings were taken to car, she is going with a walker. Taken down via wheelchair in no distress, resp even unlabored.  IV out and dressing placed, she has a clean dressing to left knee intact. New dressing placed where the debbi was removed this morning, pulses good, no complaints of numbness or tingling in left foot/leg, going home with Sisasa ball and was instructed per anesthesiologist on home care and when to pull it.  CMP taken home and instructions were given to pt and  per [facility] yesterday.  Pt denies having any questions at this time.     patient agrees to have a pap smear

## 2019-12-30 DIAGNOSIS — Z12.31 ENCOUNTER FOR SCREENING MAMMOGRAM FOR MALIGNANT NEOPLASM OF BREAST: Primary | ICD-10-CM

## 2020-01-21 ENCOUNTER — HOSPITAL ENCOUNTER (OUTPATIENT)
Dept: RADIOLOGY | Facility: HOSPITAL | Age: 55
Discharge: HOME | End: 2020-01-21
Attending: NURSE PRACTITIONER

## 2020-01-21 VITALS — BODY MASS INDEX: 41.29 KG/M2 | WEIGHT: 247.81 LBS | HEIGHT: 65 IN

## 2020-01-21 DIAGNOSIS — Z12.31 ENCOUNTER FOR SCREENING MAMMOGRAM FOR MALIGNANT NEOPLASM OF BREAST: ICD-10-CM

## 2020-01-21 PROCEDURE — 77067 SCR MAMMO BI INCL CAD: CPT | Mod: TC,PO

## 2021-03-19 ENCOUNTER — IMMUNIZATION (OUTPATIENT)
Dept: PRIMARY CARE CLINIC | Facility: CLINIC | Age: 56
End: 2021-03-19

## 2021-03-19 DIAGNOSIS — Z23 NEED FOR VACCINATION: Primary | ICD-10-CM

## 2021-03-19 PROCEDURE — 0001A COVID-19, MRNA, LNP-S, PF, 30 MCG/0.3 ML DOSE VACCINE: ICD-10-PCS | Mod: CV19,S$GLB,, | Performed by: FAMILY MEDICINE

## 2021-03-19 PROCEDURE — 91300 COVID-19, MRNA, LNP-S, PF, 30 MCG/0.3 ML DOSE VACCINE: CPT | Mod: S$GLB,,, | Performed by: FAMILY MEDICINE

## 2021-03-19 PROCEDURE — 0001A COVID-19, MRNA, LNP-S, PF, 30 MCG/0.3 ML DOSE VACCINE: CPT | Mod: CV19,S$GLB,, | Performed by: FAMILY MEDICINE

## 2021-03-19 PROCEDURE — 91300 COVID-19, MRNA, LNP-S, PF, 30 MCG/0.3 ML DOSE VACCINE: ICD-10-PCS | Mod: S$GLB,,, | Performed by: FAMILY MEDICINE

## 2021-04-09 ENCOUNTER — IMMUNIZATION (OUTPATIENT)
Dept: PRIMARY CARE CLINIC | Facility: CLINIC | Age: 56
End: 2021-04-09

## 2021-04-09 DIAGNOSIS — Z23 NEED FOR VACCINATION: Primary | ICD-10-CM

## 2021-04-09 PROCEDURE — 91300 COVID-19, MRNA, LNP-S, PF, 30 MCG/0.3 ML DOSE VACCINE: ICD-10-PCS | Mod: S$GLB,,, | Performed by: NURSE PRACTITIONER

## 2021-04-09 PROCEDURE — 0002A COVID-19, MRNA, LNP-S, PF, 30 MCG/0.3 ML DOSE VACCINE: ICD-10-PCS | Mod: CV19,S$GLB,, | Performed by: NURSE PRACTITIONER

## 2021-04-09 PROCEDURE — 0002A COVID-19, MRNA, LNP-S, PF, 30 MCG/0.3 ML DOSE VACCINE: CPT | Mod: CV19,S$GLB,, | Performed by: NURSE PRACTITIONER

## 2021-04-09 PROCEDURE — 91300 COVID-19, MRNA, LNP-S, PF, 30 MCG/0.3 ML DOSE VACCINE: CPT | Mod: S$GLB,,, | Performed by: NURSE PRACTITIONER

## 2022-01-06 ENCOUNTER — IMMUNIZATION (OUTPATIENT)
Dept: PRIMARY CARE CLINIC | Facility: CLINIC | Age: 57
End: 2022-01-06

## 2022-01-06 DIAGNOSIS — Z23 NEED FOR VACCINATION: Primary | ICD-10-CM

## 2022-01-06 PROCEDURE — 91300 COVID-19, MRNA, LNP-S, PF, 30 MCG/0.3 ML DOSE VACCINE: CPT | Mod: S$GLB,,, | Performed by: FAMILY MEDICINE

## 2022-01-06 PROCEDURE — 0004A COVID-19, MRNA, LNP-S, PF, 30 MCG/0.3 ML DOSE VACCINE: ICD-10-PCS | Mod: S$GLB,,, | Performed by: FAMILY MEDICINE

## 2022-01-06 PROCEDURE — 91300 COVID-19, MRNA, LNP-S, PF, 30 MCG/0.3 ML DOSE VACCINE: ICD-10-PCS | Mod: S$GLB,,, | Performed by: FAMILY MEDICINE

## 2022-01-06 PROCEDURE — 0004A COVID-19, MRNA, LNP-S, PF, 30 MCG/0.3 ML DOSE VACCINE: CPT | Mod: S$GLB,,, | Performed by: FAMILY MEDICINE

## 2022-03-08 DIAGNOSIS — Z12.31 ENCOUNTER FOR SCREENING MAMMOGRAM FOR BREAST CANCER: Primary | ICD-10-CM

## 2022-03-10 ENCOUNTER — HOSPITAL ENCOUNTER (OUTPATIENT)
Dept: RADIOLOGY | Facility: HOSPITAL | Age: 57
Discharge: HOME | End: 2022-03-10
Attending: INTERNAL MEDICINE

## 2022-03-10 DIAGNOSIS — Z12.31 ENCOUNTER FOR SCREENING MAMMOGRAM FOR BREAST CANCER: ICD-10-CM

## 2022-03-10 PROCEDURE — 77063 BREAST TOMOSYNTHESIS BI: CPT | Mod: TC,PO

## 2023-03-07 ENCOUNTER — HOSPITAL ENCOUNTER (OUTPATIENT)
Dept: RADIOLOGY | Facility: HOSPITAL | Age: 58
Discharge: HOME | End: 2023-03-07
Attending: NURSE PRACTITIONER
Payer: COMMERCIAL

## 2023-03-07 ENCOUNTER — OFFICE VISIT (OUTPATIENT)
Dept: FAMILY MEDICINE | Facility: CLINIC | Age: 58
End: 2023-03-07
Payer: COMMERCIAL

## 2023-03-07 VITALS
SYSTOLIC BLOOD PRESSURE: 110 MMHG | TEMPERATURE: 99 F | DIASTOLIC BLOOD PRESSURE: 70 MMHG | BODY MASS INDEX: 40.37 KG/M2 | HEART RATE: 72 BPM | HEIGHT: 65 IN | OXYGEN SATURATION: 97 % | WEIGHT: 242.31 LBS

## 2023-03-07 DIAGNOSIS — D64.9 ANEMIA, UNSPECIFIED TYPE: ICD-10-CM

## 2023-03-07 DIAGNOSIS — Z12.11 COLON CANCER SCREENING: ICD-10-CM

## 2023-03-07 DIAGNOSIS — Z15.89 COMPOUND HETEROZYGOUS MTHFR MUTATION C677T/A1298C: ICD-10-CM

## 2023-03-07 DIAGNOSIS — Z12.39 ENCOUNTER FOR SCREENING FOR MALIGNANT NEOPLASM OF BREAST, UNSPECIFIED SCREENING MODALITY: ICD-10-CM

## 2023-03-07 DIAGNOSIS — Z12.4 CERVICAL CANCER SCREENING: ICD-10-CM

## 2023-03-07 DIAGNOSIS — R22.1 NECK SWELLING: ICD-10-CM

## 2023-03-07 DIAGNOSIS — Z76.89 ENCOUNTER TO ESTABLISH CARE: Primary | ICD-10-CM

## 2023-03-07 DIAGNOSIS — I10 PRIMARY HYPERTENSION: ICD-10-CM

## 2023-03-07 PROCEDURE — 3078F DIAST BP <80 MM HG: CPT | Mod: CPTII,S$GLB,, | Performed by: NURSE PRACTITIONER

## 2023-03-07 PROCEDURE — 76536 US EXAM OF HEAD AND NECK: CPT | Mod: 26,,, | Performed by: RADIOLOGY

## 2023-03-07 PROCEDURE — 3008F PR BODY MASS INDEX (BMI) DOCUMENTED: ICD-10-PCS | Mod: CPTII,S$GLB,, | Performed by: NURSE PRACTITIONER

## 2023-03-07 PROCEDURE — 4010F ACE/ARB THERAPY RXD/TAKEN: CPT | Mod: CPTII,S$GLB,, | Performed by: NURSE PRACTITIONER

## 2023-03-07 PROCEDURE — 99213 PR OFFICE/OUTPT VISIT, EST, LEVL III, 20-29 MIN: ICD-10-PCS | Mod: S$GLB,,, | Performed by: NURSE PRACTITIONER

## 2023-03-07 PROCEDURE — 99213 OFFICE O/P EST LOW 20 MIN: CPT | Mod: S$GLB,,, | Performed by: NURSE PRACTITIONER

## 2023-03-07 PROCEDURE — 1159F MED LIST DOCD IN RCRD: CPT | Mod: CPTII,S$GLB,, | Performed by: NURSE PRACTITIONER

## 2023-03-07 PROCEDURE — 1160F RVW MEDS BY RX/DR IN RCRD: CPT | Mod: CPTII,S$GLB,, | Performed by: NURSE PRACTITIONER

## 2023-03-07 PROCEDURE — 1159F PR MEDICATION LIST DOCUMENTED IN MEDICAL RECORD: ICD-10-PCS | Mod: CPTII,S$GLB,, | Performed by: NURSE PRACTITIONER

## 2023-03-07 PROCEDURE — 99999 PR PBB SHADOW E&M-EST. PATIENT-LVL V: ICD-10-PCS | Mod: PBBFAC,,, | Performed by: NURSE PRACTITIONER

## 2023-03-07 PROCEDURE — 4010F PR ACE/ARB THEARPY RXD/TAKEN: ICD-10-PCS | Mod: CPTII,S$GLB,, | Performed by: NURSE PRACTITIONER

## 2023-03-07 PROCEDURE — 3008F BODY MASS INDEX DOCD: CPT | Mod: CPTII,S$GLB,, | Performed by: NURSE PRACTITIONER

## 2023-03-07 PROCEDURE — 99999 PR PBB SHADOW E&M-EST. PATIENT-LVL V: CPT | Mod: PBBFAC,,, | Performed by: NURSE PRACTITIONER

## 2023-03-07 PROCEDURE — 1160F PR REVIEW ALL MEDS BY PRESCRIBER/CLIN PHARMACIST DOCUMENTED: ICD-10-PCS | Mod: CPTII,S$GLB,, | Performed by: NURSE PRACTITIONER

## 2023-03-07 PROCEDURE — 3078F PR MOST RECENT DIASTOLIC BLOOD PRESSURE < 80 MM HG: ICD-10-PCS | Mod: CPTII,S$GLB,, | Performed by: NURSE PRACTITIONER

## 2023-03-07 PROCEDURE — 3074F PR MOST RECENT SYSTOLIC BLOOD PRESSURE < 130 MM HG: ICD-10-PCS | Mod: CPTII,S$GLB,, | Performed by: NURSE PRACTITIONER

## 2023-03-07 PROCEDURE — 3074F SYST BP LT 130 MM HG: CPT | Mod: CPTII,S$GLB,, | Performed by: NURSE PRACTITIONER

## 2023-03-07 PROCEDURE — 76536 US SOFT TISSUE HEAD NECK THYROID: ICD-10-PCS | Mod: 26,,, | Performed by: RADIOLOGY

## 2023-03-07 PROCEDURE — 76536 US EXAM OF HEAD AND NECK: CPT | Mod: TC

## 2023-03-07 NOTE — PROGRESS NOTES
Subjective:       Patient ID: Carola Blanco is a 57 y.o. female.    Chief Complaint: Establish Care     HPI   56 y/o female patient with medical problems listed below presents to establish care. No acute complaints reported. Patient is new to the clinic. Patient last seen by PCP was in 1/2018. Patient states she lost insurance and so could not follow up. She states current taking medication is lisinopril which she takes as needed for elevated BP but not daily. She also states was told her neck was swollen but did not have any issue. Denies recent trauma to the affected area. Denies chest pain, sob, dysphagia, nausea, abdominal pain, fever, chills.   Patient was followed by hematology for hx of PE, anemia, MTHFT mutation- not on anticoagulation now    Labs reviewed from 12/2018    Patient Active Problem List   Diagnosis    Primary osteoarthritis of left knee    S/P total knee arthroplasty, left    Hypertension    Primary osteoarthritis of right knee    S/P total knee arthroplasty, right    Pulmonary embolus    Dyspnea    Normochromic normocytic anemia    Nodule of left lung    Compound heterozygous MTHFR mutation C677T/[id number]    Homocysteinemia      Review of patient's allergies indicates:  No Known Allergies    Past Surgical History:   Procedure Laterality Date    JOINT REPLACEMENT      KNEE ARTHROPLASTY Right 10/04/2018    Procedure: ARTHROPLASTY, KNEE;  Surgeon: Markie Bang MD;  Location: [facility];  Service: Orthopedics;  Laterality: Right;    KNEE SURGERY Left 04/19/2018          Current Outpatient Medications:     lisinopril 10 MG tablet, Take 1 tablet (10 mg total) by mouth every evening., Disp: 30 tablet, Rfl: 0    naproxen (NAPROSYN) 500 MG tablet, TAKE 1 TABLET BY MOUTH TWICE DAILY AS NEEDED, Disp: 60 tablet, Rfl: 2    aspirin 325 MG tablet, Take 1 tablet (325 mg total) by mouth once daily., Disp: , Rfl: 0    ciprofloxacin HCl (CIPRO) 500 MG tablet, TK 1 T PO BID, Disp: , Rfl: 1    diclofenac sodium  "(VOLTAREN) 1 % Gel, Apply 4 g topically 4 (four) times daily., Disp: 100 g, Rfl: 0    docusate sodium (COLACE) 100 MG capsule, Take 1 capsule (100 mg total) by mouth once daily. (Patient not taking: Reported on 3/7/2023), Disp: , Rfl: 0    ELIQUIS 5 mg Tab, TAKE 1 TABLET BY MOUTH TWICE DAILY (Patient not taking: Reported on 3/7/2023), Disp: 60 tablet, Rfl: 4    folic acid-vit B6-vit B12 2.5-25-2 mg (FOLBIC OR EQUIV) 2.5-25-2 mg Tab, Take 1 tablet by mouth once daily. (Patient not taking: Reported on 3/7/2023), Disp: 30 tablet, Rfl: 6    metronidazole 1% (METROGEL) 1 % Gel, Apply topically once daily., Disp: 1 Tube, Rfl: 0    traZODone (DESYREL) 50 MG tablet, Take 2 tablets (100 mg total) by mouth every evening. 1-2 tablets by mouth daily at bedtime when necessary insomnia, Disp: 60 tablet, Rfl: 1    zolpidem (AMBIEN) 5 MG Tab, Take 1 tablet (5 mg total) by mouth every evening., Disp: 30 tablet, Rfl: 3    Lab Results   Component Value Date    WBC 8.4 12/20/2018    HGB 13.2 12/20/2018    HCT 40.0 12/20/2018     12/20/2018    CHOL 176 07/19/2010    TRIG 104 07/19/2010    HDL 41 07/19/2010    ALT 11 12/20/2018    AST 15 12/20/2018     12/20/2018    K 4.0 12/20/2018     12/20/2018    CREATININE 0.88 12/20/2018    BUN 11 12/20/2018    CO2 30 12/20/2018    TSH 0.709 07/19/2010    INR 1.0 10/04/2018     Review of Systems   Constitutional:  Negative for chills and fever.   Respiratory:  Negative for cough, chest tightness and shortness of breath.    Cardiovascular:  Negative for chest pain and palpitations.   Gastrointestinal:  Negative for abdominal pain.   Musculoskeletal:  Negative for neck pain.   Neurological:  Negative for dizziness and headaches.       Objective:   /70 (BP Location: Left arm, Patient Position: Sitting, BP Method: Medium (Manual))   Pulse 72   Temp 98.6 °F (37 °C) (Oral)   Ht 5' 5" (1.651 m)   Wt 109.9 kg (242 lb 4.6 oz)   SpO2 97%   BMI 40.32 kg/m²         Physical " Exam  Constitutional:       General: She is not in acute distress.     Appearance: Normal appearance.   HENT:      Head: Atraumatic.   Cardiovascular:      Rate and Rhythm: Normal rate and regular rhythm.      Pulses: Normal pulses.      Heart sounds: Normal heart sounds.   Pulmonary:      Effort: Pulmonary effort is normal.      Breath sounds: Normal breath sounds.   Abdominal:      General: Abdomen is flat. Bowel sounds are normal.      Palpations: Abdomen is soft.   Musculoskeletal:         General: Swelling present.      Comments: +swelling in right anterior neck   Skin:     General: Skin is warm and dry.   Neurological:      General: No focal deficit present.      Mental Status: She is alert and oriented to person, place, and time.   Psychiatric:         Mood and Affect: Mood normal.       Assessment:       1. Encounter to establish care    2. Compound heterozygous MTHFR mutation C677T/[id number]    3. Primary hypertension    4. Anemia, unspecified type    5. Encounter for screening for malignant neoplasm of breast, unspecified screening modality    6. Colon cancer screening    7. Cervical cancer screening    8. Neck swelling        Plan:       1. Compound heterozygous MTHFR mutation C677T/[id number]  - Ambulatory referral/consult to Hematology / Oncology; Future    2. Encounter to establish care  - CBC Auto Differential; Future  - Comprehensive Metabolic Panel; Future  - Lipid Panel; Future  - TSH; Future  - Hemoglobin A1C; Future  - T4, Free; Future    3. Primary hypertension  - Patient has not taken the med daily, takes once to twice a month  - Discussed regarding diet and exercises  - Advised to check BP daily and to bring the log in one month  - Comprehensive Metabolic Panel; Future    4. Anemia, unspecified type  - CBC Auto Differential; Future  - FERRITIN; Future  - Iron and TIBC; Future    5. Encounter for screening for malignant neoplasm of breast, unspecified screening modality  - Mammo Digital Screening  Bilat w/ Gordon; Future    6. Colon cancer screening  - Case Request Endoscopy: COLONOSCOPY    7. Cervical cancer screening  - Ambulatory referral/consult to Gynecology; Future    8. Neck swelling  - US Soft Tissue Head Neck Thyroid; Future     Discussed regarding tetanus and shingles vax, flu vax but patient refused    Patient with be reevaluated in 4 weeks or sooner tati Pendleton NP

## 2023-03-08 ENCOUNTER — OFFICE VISIT (OUTPATIENT)
Dept: HEMATOLOGY/ONCOLOGY | Facility: CLINIC | Age: 58
End: 2023-03-08
Payer: COMMERCIAL

## 2023-03-08 VITALS
TEMPERATURE: 98 F | DIASTOLIC BLOOD PRESSURE: 70 MMHG | HEART RATE: 79 BPM | HEIGHT: 66 IN | SYSTOLIC BLOOD PRESSURE: 116 MMHG | WEIGHT: 241.38 LBS | RESPIRATION RATE: 16 BRPM | OXYGEN SATURATION: 97 % | BODY MASS INDEX: 38.79 KG/M2

## 2023-03-08 DIAGNOSIS — Z15.89 COMPOUND HETEROZYGOUS MTHFR MUTATION C677T/A1298C: ICD-10-CM

## 2023-03-08 PROCEDURE — 3008F PR BODY MASS INDEX (BMI) DOCUMENTED: ICD-10-PCS | Mod: CPTII,S$GLB,, | Performed by: INTERNAL MEDICINE

## 2023-03-08 PROCEDURE — 4010F ACE/ARB THERAPY RXD/TAKEN: CPT | Mod: CPTII,S$GLB,, | Performed by: INTERNAL MEDICINE

## 2023-03-08 PROCEDURE — 3074F PR MOST RECENT SYSTOLIC BLOOD PRESSURE < 130 MM HG: ICD-10-PCS | Mod: CPTII,S$GLB,, | Performed by: INTERNAL MEDICINE

## 2023-03-08 PROCEDURE — 3078F DIAST BP <80 MM HG: CPT | Mod: CPTII,S$GLB,, | Performed by: INTERNAL MEDICINE

## 2023-03-08 PROCEDURE — 1159F MED LIST DOCD IN RCRD: CPT | Mod: CPTII,S$GLB,, | Performed by: INTERNAL MEDICINE

## 2023-03-08 PROCEDURE — 3074F SYST BP LT 130 MM HG: CPT | Mod: CPTII,S$GLB,, | Performed by: INTERNAL MEDICINE

## 2023-03-08 PROCEDURE — 3008F BODY MASS INDEX DOCD: CPT | Mod: CPTII,S$GLB,, | Performed by: INTERNAL MEDICINE

## 2023-03-08 PROCEDURE — 1160F RVW MEDS BY RX/DR IN RCRD: CPT | Mod: CPTII,S$GLB,, | Performed by: INTERNAL MEDICINE

## 2023-03-08 PROCEDURE — 99999 PR PBB SHADOW E&M-EST. PATIENT-LVL V: ICD-10-PCS | Mod: PBBFAC,,, | Performed by: INTERNAL MEDICINE

## 2023-03-08 PROCEDURE — 1159F PR MEDICATION LIST DOCUMENTED IN MEDICAL RECORD: ICD-10-PCS | Mod: CPTII,S$GLB,, | Performed by: INTERNAL MEDICINE

## 2023-03-08 PROCEDURE — 3078F PR MOST RECENT DIASTOLIC BLOOD PRESSURE < 80 MM HG: ICD-10-PCS | Mod: CPTII,S$GLB,, | Performed by: INTERNAL MEDICINE

## 2023-03-08 PROCEDURE — 99203 OFFICE O/P NEW LOW 30 MIN: CPT | Mod: S$GLB,,, | Performed by: INTERNAL MEDICINE

## 2023-03-08 PROCEDURE — 1160F PR REVIEW ALL MEDS BY PRESCRIBER/CLIN PHARMACIST DOCUMENTED: ICD-10-PCS | Mod: CPTII,S$GLB,, | Performed by: INTERNAL MEDICINE

## 2023-03-08 PROCEDURE — 99203 PR OFFICE/OUTPT VISIT, NEW, LEVL III, 30-44 MIN: ICD-10-PCS | Mod: S$GLB,,, | Performed by: INTERNAL MEDICINE

## 2023-03-08 PROCEDURE — 4010F PR ACE/ARB THEARPY RXD/TAKEN: ICD-10-PCS | Mod: CPTII,S$GLB,, | Performed by: INTERNAL MEDICINE

## 2023-03-08 PROCEDURE — 99999 PR PBB SHADOW E&M-EST. PATIENT-LVL V: CPT | Mod: PBBFAC,,, | Performed by: INTERNAL MEDICINE

## 2023-03-08 NOTE — PROGRESS NOTES
Service Date:  3/8/23    Chief Complaint: MTHFR gene mutation    Carola Blanco is a 57 y.o. female with history of PE.  This was in 2018 following a knee surgery.  She was on anticoagulation at that time for 6 months and then stopped.  She was found to have a MTHFR gene mutation.  All other hypercoagulable workup was negative.  She was referred here for that reason.  She is not had a repeat PE or DVT since that time.  She is never had 1 prior to that.  She has no family history.  Her biggest risk factor is obesity.  She denies any tobacco use.    Review of Systems   Constitutional: Negative.    HENT: Negative.     Eyes: Negative.    Respiratory: Negative.     Cardiovascular: Negative.    Gastrointestinal: Negative.    Endocrine: Negative.    Genitourinary: Negative.    Musculoskeletal: Negative.    Integumentary:  Negative.   Neurological: Negative.    Hematological: Negative.    Psychiatric/Behavioral: Negative.        Current Outpatient Medications   Medication Instructions    aspirin 325 mg, Oral, Daily    ciprofloxacin HCl (CIPRO) 500 MG tablet TK 1 T PO BID    diclofenac sodium (VOLTAREN) 4 g, Topical (Top), 4 times daily    docusate sodium (COLACE) 100 mg, Oral, Daily    ELIQUIS 5 mg Tab TAKE 1 TABLET BY MOUTH TWICE DAILY    folic acid-vit B6-vit B12 2.5-25-2 mg (FOLBIC OR EQUIV) 2.5-25-2 mg Tab 1 tablet, Oral, Daily    lisinopriL 10 mg, Oral, Nightly    metronidazole 1% (METROGEL) 1 % Gel Topical (Top), Daily    naproxen (NAPROSYN) 500 MG tablet TAKE 1 TABLET BY MOUTH TWICE DAILY AS NEEDED    traZODone (DESYREL) 100 mg, Oral, Nightly, 1-2 tablets by mouth daily at bedtime when necessary insomnia    zolpidem (AMBIEN) 5 mg, Oral, Nightly        Past Medical History:   Diagnosis Date    Arthritis     Compound heterozygous MTHFR mutation [id number] 12/12/2018    Homocysteinemia 12/12/2018    Hypertension     Nodule of left lung 10/17/2018    Normochromic normocytic anemia 10/17/2018        Past Surgical  "History:   Procedure Laterality Date    JOINT REPLACEMENT      KNEE ARTHROPLASTY Right 10/04/2018    Procedure: ARTHROPLASTY, KNEE;  Surgeon: Markie Bang MD;  Location: [facility];  Service: Orthopedics;  Laterality: Right;    KNEE SURGERY Left 04/19/2018        Family History   Problem Relation Age of Onset    Cancer Mother         breast    Arthritis Mother     Birth defects Mother     Breast cancer Mother     Cancer Father         lung    Arthritis Maternal Grandmother        Social History     Tobacco Use    Smoking status: Never    Smokeless tobacco: Never   Substance Use Topics    Alcohol use: Never    Drug use: No         Vitals:    03/08/23 1439   BP: 116/70   Pulse: 79   Resp: 16   Temp: 97.9 °F (36.6 °C)        Physical Exam:  /70 (BP Location: Left arm, Patient Position: Sitting, BP Method: Large (Automatic))   Pulse 79   Temp 97.9 °F (36.6 °C) (Temporal)   Resp 16   Ht 5' 6" (1.676 m)   Wt 109.5 kg (241 lb 6.5 oz)   SpO2 97%   BMI 38.96 kg/m²     Physical Exam  Constitutional:       Appearance: Normal appearance.   HENT:      Head: Normocephalic and atraumatic.      Nose: Nose normal.      Mouth/Throat:      Mouth: Mucous membranes are moist.      Pharynx: Oropharynx is clear.   Eyes:      Conjunctiva/sclera: Conjunctivae normal.   Cardiovascular:      Rate and Rhythm: Normal rate and regular rhythm.      Heart sounds: Normal heart sounds.   Pulmonary:      Effort: Pulmonary effort is normal.      Breath sounds: Normal breath sounds.   Abdominal:      General: Abdomen is flat. Bowel sounds are normal.      Palpations: Abdomen is soft.   Musculoskeletal:         General: Normal range of motion.      Cervical back: Normal range of motion and neck supple.   Skin:     General: Skin is warm and dry.   Neurological:      General: No focal deficit present.      Mental Status: She is alert and oriented to person, place, and time. Mental status is at baseline.   Psychiatric:         Mood and " Affect: Mood normal.        Labs:  Lab Results   Component Value Date    WBC 8.4 12/20/2018    RBC 4.86 12/20/2018    HGB 13.2 12/20/2018    HCT 40.0 12/20/2018    MCV 82.3 12/20/2018    MCH 27.2 12/20/2018    MCHC 33.0 12/20/2018    RDW 13.6 12/20/2018     12/20/2018    MPV 10.1 12/20/2018    GRAN 6.0 10/09/2018    GRAN 69.0 10/09/2018    LYMPH 2,932 12/20/2018    LYMPH 34.9 12/20/2018    MONO 706 12/20/2018    MONO 8.4 12/20/2018     12/20/2018    BASO 42 12/20/2018    EOSINOPHIL 2.4 12/20/2018    BASOPHIL 0.5 12/20/2018     Sodium   Date Value Ref Range Status   12/20/2018 140 135 - 146 mmol/L Final     Potassium   Date Value Ref Range Status   12/20/2018 4.0 3.5 - 5.3 mmol/L Final     Chloride   Date Value Ref Range Status   12/20/2018 102 98 - 110 mmol/L Final     CO2   Date Value Ref Range Status   12/20/2018 30 20 - 32 mmol/L Final     Glucose   Date Value Ref Range Status   12/20/2018 88 65 - 139 mg/dL Final     Comment:               Non-fasting reference interval          BUN   Date Value Ref Range Status   12/20/2018 11 7 - 25 mg/dL Final     Creatinine   Date Value Ref Range Status   12/20/2018 0.88 0.50 - 1.05 mg/dL Final     Comment:     For patients >49 years of age, the reference limit  for Creatinine is approximately 13% higher for people  identified as -American.          Calcium   Date Value Ref Range Status   12/20/2018 9.4 8.6 - 10.4 mg/dL Final     Total Protein   Date Value Ref Range Status   12/20/2018 7.1 6.1 - 8.1 g/dL Final     Albumin   Date Value Ref Range Status   12/20/2018 4.0 3.6 - 5.1 g/dL Final     Total Bilirubin   Date Value Ref Range Status   12/20/2018 0.3 0.2 - 1.2 mg/dL Final     Alkaline Phosphatase   Date Value Ref Range Status   12/20/2018 34 33 - 130 U/L Final     AST   Date Value Ref Range Status   12/20/2018 15 10 - 35 U/L Final     ALT   Date Value Ref Range Status   12/20/2018 11 6 - 29 U/L Final     Anion Gap   Date Value Ref Range Status    10/09/2018 8 8 - 16 mmol/L Final     eGFR if    Date Value Ref Range Status   12/20/2018 88 > OR = 60 mL/min/1.73m2 Final     eGFR if non    Date Value Ref Range Status   12/20/2018 76 > OR = 60 mL/min/1.73m2 Final       A/P:    History of PE   -this was in 2018 and patient was treated for 6 months with anticoagulation and then stopped on her own.    -hypercoagulable workup back in 2018/2019 was negative aside from a positive MTHFR gene mutation.  I explained to her that this mutation is of no clinical significance in my opinion.  I do not believe that this increases her risk of thromboembolism, as it has not been shown to be the case in any recent studies.  -I do believe if she were to ever have another major surgery she would need anticoagulation around the surgery and following, but this is not due to gene mutation, rather simply due to her history of DVT.  -she can return to clinic as needed.      Aurash Khoobehi, MD  Hematology and Oncology

## 2023-03-09 ENCOUNTER — TELEPHONE (OUTPATIENT)
Dept: GASTROENTEROLOGY | Facility: CLINIC | Age: 58
End: 2023-03-09
Payer: COMMERCIAL

## 2023-03-09 ENCOUNTER — PATIENT MESSAGE (OUTPATIENT)
Dept: GASTROENTEROLOGY | Facility: CLINIC | Age: 58
End: 2023-03-09
Payer: COMMERCIAL

## 2023-03-09 NOTE — TELEPHONE ENCOUNTER
Colonoscopy 5/9 at 9am arrive for 8am instructions reviewed and patient states understanding. Copy to portal.

## 2023-03-13 ENCOUNTER — LAB VISIT (OUTPATIENT)
Dept: LAB | Facility: HOSPITAL | Age: 58
End: 2023-03-13
Attending: NURSE PRACTITIONER
Payer: COMMERCIAL

## 2023-03-13 DIAGNOSIS — D64.9 ANEMIA, UNSPECIFIED TYPE: ICD-10-CM

## 2023-03-13 DIAGNOSIS — I10 PRIMARY HYPERTENSION: ICD-10-CM

## 2023-03-13 DIAGNOSIS — Z76.89 ENCOUNTER TO ESTABLISH CARE: ICD-10-CM

## 2023-03-13 LAB
ALBUMIN SERPL BCP-MCNC: 4.2 G/DL (ref 3.5–5.2)
ALP SERPL-CCNC: 35 U/L (ref 55–135)
ALT SERPL W/O P-5'-P-CCNC: 14 U/L (ref 10–44)
ANION GAP SERPL CALC-SCNC: 8 MMOL/L (ref 8–16)
AST SERPL-CCNC: 18 U/L (ref 10–40)
BASOPHILS # BLD AUTO: 0.06 K/UL (ref 0–0.2)
BASOPHILS NFR BLD: 0.7 % (ref 0–1.9)
BILIRUB SERPL-MCNC: 0.6 MG/DL (ref 0.1–1)
BUN SERPL-MCNC: 18 MG/DL (ref 6–20)
CALCIUM SERPL-MCNC: 9.9 MG/DL (ref 8.7–10.5)
CHLORIDE SERPL-SCNC: 101 MMOL/L (ref 95–110)
CHOLEST SERPL-MCNC: 237 MG/DL (ref 120–199)
CHOLEST/HDLC SERPL: 3.9 {RATIO} (ref 2–5)
CO2 SERPL-SCNC: 28 MMOL/L (ref 23–29)
CREAT SERPL-MCNC: 0.9 MG/DL (ref 0.5–1.4)
DIFFERENTIAL METHOD: ABNORMAL
EOSINOPHIL # BLD AUTO: 0.2 K/UL (ref 0–0.5)
EOSINOPHIL NFR BLD: 1.8 % (ref 0–8)
ERYTHROCYTE [DISTWIDTH] IN BLOOD BY AUTOMATED COUNT: 13.2 % (ref 11.5–14.5)
EST. GFR  (NO RACE VARIABLE): >60 ML/MIN/1.73 M^2
ESTIMATED AVG GLUCOSE: 108 MG/DL (ref 68–131)
FERRITIN SERPL-MCNC: 126 NG/ML (ref 20–300)
GLUCOSE SERPL-MCNC: 77 MG/DL (ref 70–110)
HBA1C MFR BLD: 5.4 % (ref 4–5.6)
HCT VFR BLD AUTO: 46.8 % (ref 37–48.5)
HDLC SERPL-MCNC: 61 MG/DL (ref 40–75)
HDLC SERPL: 25.7 % (ref 20–50)
HGB BLD-MCNC: 14.5 G/DL (ref 12–16)
IMM GRANULOCYTES # BLD AUTO: 0.01 K/UL (ref 0–0.04)
IMM GRANULOCYTES NFR BLD AUTO: 0.1 % (ref 0–0.5)
IRON SERPL-MCNC: 65 UG/DL (ref 30–160)
LDLC SERPL CALC-MCNC: 150.2 MG/DL (ref 63–159)
LYMPHOCYTES # BLD AUTO: 3.2 K/UL (ref 1–4.8)
LYMPHOCYTES NFR BLD: 37.9 % (ref 18–48)
MCH RBC QN AUTO: 27.4 PG (ref 27–31)
MCHC RBC AUTO-ENTMCNC: 31 G/DL (ref 32–36)
MCV RBC AUTO: 89 FL (ref 82–98)
MONOCYTES # BLD AUTO: 0.6 K/UL (ref 0.3–1)
MONOCYTES NFR BLD: 6.9 % (ref 4–15)
NEUTROPHILS # BLD AUTO: 4.5 K/UL (ref 1.8–7.7)
NEUTROPHILS NFR BLD: 52.6 % (ref 38–73)
NONHDLC SERPL-MCNC: 176 MG/DL
NRBC BLD-RTO: 0 /100 WBC
PLATELET # BLD AUTO: 331 K/UL (ref 150–450)
PMV BLD AUTO: 9.9 FL (ref 9.2–12.9)
POTASSIUM SERPL-SCNC: 4.4 MMOL/L (ref 3.5–5.1)
PROT SERPL-MCNC: 7.4 G/DL (ref 6–8.4)
RBC # BLD AUTO: 5.29 M/UL (ref 4–5.4)
SATURATED IRON: 16 % (ref 20–50)
SODIUM SERPL-SCNC: 137 MMOL/L (ref 136–145)
T4 FREE SERPL-MCNC: 1.05 NG/DL (ref 0.71–1.51)
TOTAL IRON BINDING CAPACITY: 403 UG/DL (ref 250–450)
TRANSFERRIN SERPL-MCNC: 272 MG/DL (ref 200–375)
TRIGL SERPL-MCNC: 129 MG/DL (ref 30–150)
TSH SERPL DL<=0.005 MIU/L-ACNC: 0.84 UIU/ML (ref 0.4–4)
WBC # BLD AUTO: 8.49 K/UL (ref 3.9–12.7)

## 2023-03-13 PROCEDURE — 80053 COMPREHEN METABOLIC PANEL: CPT | Performed by: NURSE PRACTITIONER

## 2023-03-13 PROCEDURE — 84443 ASSAY THYROID STIM HORMONE: CPT | Performed by: NURSE PRACTITIONER

## 2023-03-13 PROCEDURE — 84466 ASSAY OF TRANSFERRIN: CPT | Performed by: NURSE PRACTITIONER

## 2023-03-13 PROCEDURE — 83036 HEMOGLOBIN GLYCOSYLATED A1C: CPT | Performed by: NURSE PRACTITIONER

## 2023-03-13 PROCEDURE — 36415 COLL VENOUS BLD VENIPUNCTURE: CPT | Mod: PO | Performed by: NURSE PRACTITIONER

## 2023-03-13 PROCEDURE — 80061 LIPID PANEL: CPT | Performed by: NURSE PRACTITIONER

## 2023-03-13 PROCEDURE — 82728 ASSAY OF FERRITIN: CPT | Performed by: NURSE PRACTITIONER

## 2023-03-13 PROCEDURE — 85025 COMPLETE CBC W/AUTO DIFF WBC: CPT | Performed by: NURSE PRACTITIONER

## 2023-03-13 PROCEDURE — 84439 ASSAY OF FREE THYROXINE: CPT | Performed by: NURSE PRACTITIONER

## 2023-03-21 ENCOUNTER — HOSPITAL ENCOUNTER (OUTPATIENT)
Dept: RADIOLOGY | Facility: HOSPITAL | Age: 58
Discharge: HOME | End: 2023-03-21
Attending: NURSE PRACTITIONER
Payer: COMMERCIAL

## 2023-03-21 ENCOUNTER — OFFICE VISIT (OUTPATIENT)
Dept: OBSTETRICS AND GYNECOLOGY | Facility: CLINIC | Age: 58
End: 2023-03-21
Payer: COMMERCIAL

## 2023-03-21 VITALS
HEART RATE: 75 BPM | SYSTOLIC BLOOD PRESSURE: 129 MMHG | WEIGHT: 242.94 LBS | HEIGHT: 66 IN | DIASTOLIC BLOOD PRESSURE: 71 MMHG | BODY MASS INDEX: 39.04 KG/M2

## 2023-03-21 VITALS — WEIGHT: 242.94 LBS | BODY MASS INDEX: 39.04 KG/M2 | HEIGHT: 66 IN

## 2023-03-21 DIAGNOSIS — Z01.419 WELL WOMAN EXAM WITH ROUTINE GYNECOLOGICAL EXAM: Primary | ICD-10-CM

## 2023-03-21 DIAGNOSIS — Z12.39 ENCOUNTER FOR SCREENING FOR MALIGNANT NEOPLASM OF BREAST, UNSPECIFIED SCREENING MODALITY: ICD-10-CM

## 2023-03-21 DIAGNOSIS — Z12.4 CERVICAL CANCER SCREENING: ICD-10-CM

## 2023-03-21 DIAGNOSIS — Z12.4 SCREENING FOR MALIGNANT NEOPLASM OF CERVIX: ICD-10-CM

## 2023-03-21 PROCEDURE — 3044F HG A1C LEVEL LT 7.0%: CPT | Mod: CPTII,S$GLB,, | Performed by: STUDENT IN AN ORGANIZED HEALTH CARE EDUCATION/TRAINING PROGRAM

## 2023-03-21 PROCEDURE — 3074F SYST BP LT 130 MM HG: CPT | Mod: CPTII,S$GLB,, | Performed by: STUDENT IN AN ORGANIZED HEALTH CARE EDUCATION/TRAINING PROGRAM

## 2023-03-21 PROCEDURE — 3044F PR MOST RECENT HEMOGLOBIN A1C LEVEL <7.0%: ICD-10-PCS | Mod: CPTII,S$GLB,, | Performed by: STUDENT IN AN ORGANIZED HEALTH CARE EDUCATION/TRAINING PROGRAM

## 2023-03-21 PROCEDURE — 4010F ACE/ARB THERAPY RXD/TAKEN: CPT | Mod: CPTII,S$GLB,, | Performed by: STUDENT IN AN ORGANIZED HEALTH CARE EDUCATION/TRAINING PROGRAM

## 2023-03-21 PROCEDURE — 3078F DIAST BP <80 MM HG: CPT | Mod: CPTII,S$GLB,, | Performed by: STUDENT IN AN ORGANIZED HEALTH CARE EDUCATION/TRAINING PROGRAM

## 2023-03-21 PROCEDURE — 88175 CYTOPATH C/V AUTO FLUID REDO: CPT | Performed by: STUDENT IN AN ORGANIZED HEALTH CARE EDUCATION/TRAINING PROGRAM

## 2023-03-21 PROCEDURE — 1159F MED LIST DOCD IN RCRD: CPT | Mod: CPTII,S$GLB,, | Performed by: STUDENT IN AN ORGANIZED HEALTH CARE EDUCATION/TRAINING PROGRAM

## 2023-03-21 PROCEDURE — 3074F PR MOST RECENT SYSTOLIC BLOOD PRESSURE < 130 MM HG: ICD-10-PCS | Mod: CPTII,S$GLB,, | Performed by: STUDENT IN AN ORGANIZED HEALTH CARE EDUCATION/TRAINING PROGRAM

## 2023-03-21 PROCEDURE — 99386 PR PREVENTIVE VISIT,NEW,40-64: ICD-10-PCS | Mod: S$GLB,,, | Performed by: STUDENT IN AN ORGANIZED HEALTH CARE EDUCATION/TRAINING PROGRAM

## 2023-03-21 PROCEDURE — 99999 PR PBB SHADOW E&M-EST. PATIENT-LVL III: CPT | Mod: PBBFAC,,, | Performed by: STUDENT IN AN ORGANIZED HEALTH CARE EDUCATION/TRAINING PROGRAM

## 2023-03-21 PROCEDURE — 4010F PR ACE/ARB THEARPY RXD/TAKEN: ICD-10-PCS | Mod: CPTII,S$GLB,, | Performed by: STUDENT IN AN ORGANIZED HEALTH CARE EDUCATION/TRAINING PROGRAM

## 2023-03-21 PROCEDURE — 99386 PREV VISIT NEW AGE 40-64: CPT | Mod: S$GLB,,, | Performed by: STUDENT IN AN ORGANIZED HEALTH CARE EDUCATION/TRAINING PROGRAM

## 2023-03-21 PROCEDURE — 77067 SCR MAMMO BI INCL CAD: CPT | Mod: TC,PO

## 2023-03-21 PROCEDURE — 1159F PR MEDICATION LIST DOCUMENTED IN MEDICAL RECORD: ICD-10-PCS | Mod: CPTII,S$GLB,, | Performed by: STUDENT IN AN ORGANIZED HEALTH CARE EDUCATION/TRAINING PROGRAM

## 2023-03-21 PROCEDURE — 3008F BODY MASS INDEX DOCD: CPT | Mod: CPTII,S$GLB,, | Performed by: STUDENT IN AN ORGANIZED HEALTH CARE EDUCATION/TRAINING PROGRAM

## 2023-03-21 PROCEDURE — 87624 HPV HI-RISK TYP POOLED RSLT: CPT | Performed by: STUDENT IN AN ORGANIZED HEALTH CARE EDUCATION/TRAINING PROGRAM

## 2023-03-21 PROCEDURE — 1160F RVW MEDS BY RX/DR IN RCRD: CPT | Mod: CPTII,S$GLB,, | Performed by: STUDENT IN AN ORGANIZED HEALTH CARE EDUCATION/TRAINING PROGRAM

## 2023-03-21 PROCEDURE — 3008F PR BODY MASS INDEX (BMI) DOCUMENTED: ICD-10-PCS | Mod: CPTII,S$GLB,, | Performed by: STUDENT IN AN ORGANIZED HEALTH CARE EDUCATION/TRAINING PROGRAM

## 2023-03-21 PROCEDURE — 1160F PR REVIEW ALL MEDS BY PRESCRIBER/CLIN PHARMACIST DOCUMENTED: ICD-10-PCS | Mod: CPTII,S$GLB,, | Performed by: STUDENT IN AN ORGANIZED HEALTH CARE EDUCATION/TRAINING PROGRAM

## 2023-03-21 PROCEDURE — 99999 PR PBB SHADOW E&M-EST. PATIENT-LVL III: ICD-10-PCS | Mod: PBBFAC,,, | Performed by: STUDENT IN AN ORGANIZED HEALTH CARE EDUCATION/TRAINING PROGRAM

## 2023-03-21 PROCEDURE — 3078F PR MOST RECENT DIASTOLIC BLOOD PRESSURE < 80 MM HG: ICD-10-PCS | Mod: CPTII,S$GLB,, | Performed by: STUDENT IN AN ORGANIZED HEALTH CARE EDUCATION/TRAINING PROGRAM

## 2023-03-21 NOTE — PROGRESS NOTES
[facility]    Subjective:   Chief Complaint:    Chief Complaint   Patient presents with    Annual Exam       No LMP recorded. Patient is postmenopausal.  Contraception: Postmenopausal.  HRT: None.    Carola Blanco is a 57 y.o.  who presents for an annual exam.  She participates in regular exercise: no.  She does not smoke.  She wears seatbelts.  She is not taking a multivitamin, vitamin D, and calcium.  She denies any domestic violence.    She reports being on Depo provera for a while and when she stopped Depo in early 50s, she did not have any cycles. No GYN complaints at today's visit.     Last Pap: years ago. Denies any history of abnormal pap smears.  She is due for mammogram and colonoscopy, both have been scheduled.    FH:  Breast cancer: mother (diagnosed at 71, ).  Colon cancer: none.  Endometrial cancer: none.  Ovarian cancer: none.      OB History    Para Term  AB Living   3 3 3     3   SAB IAB Ectopic Multiple Live Births           3      # Outcome Date GA Lbr Shailesh/2nd Weight Sex Delivery Anes PTL Lv   3 Term 93    F Vag-Spont   SONIA   2 Term 91    F Vag-Spont   SONIA   1 Term 89    F Vag-Spont   SONIA      Obstetric Comments   Vaginal delivery x 3.        Past Medical History:   Diagnosis Date    Arthritis     Compound heterozygous MTHFR mutation [id number] 2018    Homocysteinemia 2018    Hypertension     Nodule of left lung 10/17/2018    Normochromic normocytic anemia 10/17/2018     Past Surgical History:   Procedure Laterality Date    JOINT REPLACEMENT      KNEE ARTHROPLASTY Right 10/04/2018    Procedure: ARTHROPLASTY, KNEE;  Surgeon: Markie Bang MD;  Location: [facility];  Service: Orthopedics;  Laterality: Right;    KNEE SURGERY Left 2018     Review of patient's allergies indicates:  No Known Allergies    Social History     Socioeconomic History    Marital status:    Tobacco Use    Smoking status: Never     Smokeless tobacco: Never   Substance and Sexual Activity    Alcohol use: Never    Drug use: No    Sexual activity: Yes     Partners: Male     Birth control/protection: Post-menopausal       Family History   Problem Relation Age of Onset    Arthritis Mother     Birth defects Mother     Breast cancer Mother     Lung cancer Father         lung    Arthritis Maternal Grandmother        Medications:  Current Outpatient Medications on File Prior to Visit   Medication Sig Dispense Refill Last Dose    naproxen (NAPROSYN) 500 MG tablet TAKE 1 TABLET BY MOUTH TWICE DAILY AS NEEDED 60 tablet 2 Taking    zolpidem (AMBIEN) 5 MG Tab Take 1 tablet (5 mg total) by mouth every evening. 30 tablet 3 Taking    [DISCONTINUED] aspirin 325 MG tablet Take 1 tablet (325 mg total) by mouth once daily.  0     [DISCONTINUED] ciprofloxacin HCl (CIPRO) 500 MG tablet TK 1 T PO BID  1     [DISCONTINUED] diclofenac sodium (VOLTAREN) 1 % Gel Apply 4 g topically 4 (four) times daily. 100 g 0     [DISCONTINUED] docusate sodium (COLACE) 100 MG capsule Take 1 capsule (100 mg total) by mouth once daily. (Patient not taking: Reported on 3/7/2023)  0 Not Taking    [DISCONTINUED] ELIQUIS 5 mg Tab TAKE 1 TABLET BY MOUTH TWICE DAILY (Patient not taking: Reported on 3/7/2023) 60 tablet 4 Not Taking    [DISCONTINUED] folic acid-vit B6-vit B12 2.5-25-2 mg (FOLBIC OR EQUIV) 2.5-25-2 mg Tab Take 1 tablet by mouth once daily. (Patient not taking: Reported on 3/7/2023) 30 tablet 6 Not Taking    [DISCONTINUED] lisinopril 10 MG tablet Take 1 tablet (10 mg total) by mouth every evening. 30 tablet 0     [DISCONTINUED] metronidazole 1% (METROGEL) 1 % Gel Apply topically once daily. 1 Tube 0     [DISCONTINUED] traZODone (DESYREL) 50 MG tablet Take 2 tablets (100 mg total) by mouth every evening. 1-2 tablets by mouth daily at bedtime when necessary insomnia 60 tablet 1        Review of Systems   Constitutional: Negative for appetite change, fever and unexpected weight  "change.   ENT: Negative for ear pain, tinnitus, sinus congestion or trouble swallowing.   Respiratory: Negative for cough and shortness of breath.    Cardiovascular: Negative for chest pain and palpitations.   Gastrointestinal: Negative for abdominal distention, constipation, nausea and vomiting.   Genitourinary: Negative for dyspareunia, dysuria, hematuria and pelvic pain.        GYN ROS per HPI.   Musculoskeletal: Negative for gait problem and myalgias.   Skin: Negative for rash.   Neurological: Negative for dizziness, light-headedness and headaches.   Psychiatric/Behavioral: The patient is not nervous/anxious.      Objective:   /71 (BP Location: Right arm, Patient Position: Sitting, BP Method: Large (Automatic))   Pulse 75   Ht 5' 6" (1.676 m)   Wt 110.2 kg (242 lb 15.2 oz)   BMI 39.21 kg/m²     Physical Exam  Vitals reviewed. Exam conducted with a chaperone present.   HENT:      Head: Normocephalic and atraumatic.   Cardiovascular:      Rate and Rhythm: Normal rate and regular rhythm.   Pulmonary:      Effort: Pulmonary effort is normal. No accessory muscle usage or respiratory distress.   Chest:      Chest wall: No tenderness.   Breasts:     Breasts are symmetrical.      Right: No inverted nipple, mass, nipple discharge, skin change or tenderness.      Left: No inverted nipple, mass, nipple discharge, skin change or tenderness.   Abdominal:      General: Abdomen is flat.      Tenderness: There is no abdominal tenderness. There is no guarding.   Genitourinary:     General: Normal vulva.      Pubic Area: No rash.       Labia:         Right: No rash or tenderness.         Left: No rash or tenderness.       Urethra: No prolapse.      Vagina: Normal. No tenderness.      Cervix: No cervical motion tenderness, erythema or cervical bleeding.      Uterus: Not tender.       Adnexa:         Right: No mass or tenderness.          Left: No mass or tenderness.        Comments: Exam limited due to body habitus. "   Musculoskeletal:      Right lower leg: No edema.      Left lower leg: No edema.   Lymphadenopathy:      Upper Body:      Right upper body: No axillary adenopathy.      Left upper body: No axillary adenopathy.   Neurological:      General: No focal deficit present.      Mental Status: She is alert. Mental status is at baseline.        Assessment:     1. Well woman exam with routine gynecological exam    2. Cervical cancer screening    3. Screening for malignant neoplasm of cervix      Plan:     57 y.o. female presents today for annual pap smear and exam.     1. Well woman exam with routine gynecological exam    2. Cervical cancer screening  - Ambulatory referral/consult to Gynecology    3. Screening for malignant neoplasm of cervix  - Liquid-Based Pap Smear, Screening  - HPV High Risk Genotypes, PCR      Follow up in about 1 year (around 3/21/2024) for annual exam or sooner if any GYN issues arise.    The above was reviewed and discussed with the patient.    Annual exam and screening issues based on the patient's age and family history were discussed.       - Pap and HPV testing performed.   - Mammogram ordered/scheduled by PCP.  - Colonoscopy scheduled.  - Tobacco cessation N/A.  - DEXA N/A.  - Counseled to take daily multivitamin. If patient is of reproductive age and not on contraception, to take prenatal vitamin. Patient has been counseled on the vitamin D and calcium requirements per ACOG recommendations.    Age    Calcium(mg/day)    Vitamin D (IU/day)  9-18     1300                       600  19-50   1000                       600  51-70   1200                       600  >70       1200                       800      The patient's questions were answered, and she agrees with the current plan.     The patient was counseled today on the new ACS guidelines for cervical cytology screening as well as the current recommendations for breast cancer screening. She was counseled to follow up with her PCP for other  routine health maintenance.       Venita Esquivel PA-C  03/21/2023

## 2023-03-24 ENCOUNTER — OFFICE VISIT (OUTPATIENT)
Dept: DERMATOLOGY | Facility: CLINIC | Age: 58
End: 2023-03-24
Payer: COMMERCIAL

## 2023-03-24 DIAGNOSIS — L71.9 ROSACEA: Primary | ICD-10-CM

## 2023-03-24 DIAGNOSIS — L82.1 SEBORRHEIC KERATOSES: ICD-10-CM

## 2023-03-24 PROCEDURE — 1159F PR MEDICATION LIST DOCUMENTED IN MEDICAL RECORD: ICD-10-PCS | Mod: CPTII,S$GLB,, | Performed by: DERMATOLOGY

## 2023-03-24 PROCEDURE — 3044F HG A1C LEVEL LT 7.0%: CPT | Mod: CPTII,S$GLB,, | Performed by: DERMATOLOGY

## 2023-03-24 PROCEDURE — 1159F MED LIST DOCD IN RCRD: CPT | Mod: CPTII,S$GLB,, | Performed by: DERMATOLOGY

## 2023-03-24 PROCEDURE — 4010F ACE/ARB THERAPY RXD/TAKEN: CPT | Mod: CPTII,S$GLB,, | Performed by: DERMATOLOGY

## 2023-03-24 PROCEDURE — 1160F RVW MEDS BY RX/DR IN RCRD: CPT | Mod: CPTII,S$GLB,, | Performed by: DERMATOLOGY

## 2023-03-24 PROCEDURE — 99204 PR OFFICE/OUTPT VISIT, NEW, LEVL IV, 45-59 MIN: ICD-10-PCS | Mod: S$GLB,,, | Performed by: DERMATOLOGY

## 2023-03-24 PROCEDURE — 99204 OFFICE O/P NEW MOD 45 MIN: CPT | Mod: S$GLB,,, | Performed by: DERMATOLOGY

## 2023-03-24 PROCEDURE — 1160F PR REVIEW ALL MEDS BY PRESCRIBER/CLIN PHARMACIST DOCUMENTED: ICD-10-PCS | Mod: CPTII,S$GLB,, | Performed by: DERMATOLOGY

## 2023-03-24 PROCEDURE — 3044F PR MOST RECENT HEMOGLOBIN A1C LEVEL <7.0%: ICD-10-PCS | Mod: CPTII,S$GLB,, | Performed by: DERMATOLOGY

## 2023-03-24 PROCEDURE — 99999 PR PBB SHADOW E&M-EST. PATIENT-LVL III: CPT | Mod: PBBFAC,,, | Performed by: DERMATOLOGY

## 2023-03-24 PROCEDURE — 99999 PR PBB SHADOW E&M-EST. PATIENT-LVL III: ICD-10-PCS | Mod: PBBFAC,,, | Performed by: DERMATOLOGY

## 2023-03-24 PROCEDURE — 4010F PR ACE/ARB THEARPY RXD/TAKEN: ICD-10-PCS | Mod: CPTII,S$GLB,, | Performed by: DERMATOLOGY

## 2023-03-24 RX ORDER — DOXYCYCLINE 100 MG/1
TABLET ORAL
Qty: 37 TABLET | Refills: 2 | Status: SHIPPED | OUTPATIENT
Start: 2023-03-24 | End: 2023-06-07 | Stop reason: SDUPTHER

## 2023-03-24 NOTE — PROGRESS NOTES
"  Subjective:       Patient ID:  Carola Blanco is a 57 y.o. female who presents for   Chief Complaint   Patient presents with    Rash     face     New patient     Patient here today for rash on face x years  A little itchy  Flares, come and go  States she has tried multiple OTC treatments, not much improvement   Currently washes face in the AM and applies moisturizer. Tries to cover with makeup   "Regular daily wash from [employer]", ulta moisturizing cream      Current Outpatient Medications:   ·  naproxen (NAPROSYN) 500 MG tablet, TAKE 1 TABLET BY MOUTH TWICE DAILY AS NEEDED, Disp: 60 tablet, Rfl: 2  ·  zolpidem (AMBIEN) 5 MG Tab, Take 1 tablet (5 mg total) by mouth every evening., Disp: 30 tablet, Rfl: 3     Review of Systems   Constitutional:  Negative for fever, chills and fatigue.   Respiratory:  Negative for cough and shortness of breath.    Gastrointestinal:  Negative for nausea and vomiting.   Skin:  Negative for daily sunscreen use and activity-related sunscreen use.      Objective:    Physical Exam   Constitutional: She appears well-developed and well-nourished.   Neurological: She is alert and oriented to person, place, and time.   Psychiatric: She has a normal mood and affect.        Diagram Legend     Erythematous scaling macule/papule c/w actinic keratosis       Vascular papule c/w angioma      Pigmented verrucoid papule/plaque c/w seborrheic keratosis      Yellow umbilicated papule c/w sebaceous hyperplasia      Irregularly shaped tan macule c/w lentigo     1-2 mm smooth white papules consistent with Milia      Movable subcutaneous cyst with punctum c/w epidermal inclusion cyst      Subcutaneous movable cyst c/w pilar cyst      Firm pink to brown papule c/w dermatofibroma      Pedunculated fleshy papule(s) c/w skin tag(s)      Evenly pigmented macule c/w junctional nevus     Mildly variegated pigmented, slightly irregular-bordered macule c/w mildly atypical nevus      Flesh colored to evenly pigmented " papule c/w intradermal nevus       Pink pearly papule/plaque c/w basal cell carcinoma      Erythematous hyperkeratotic cursted plaque c/w SCC      Surgical scar with no sign of skin cancer recurrence      Open and closed comedones      Inflammatory papules and pustules      Verrucoid papule consistent consistent with wart     Erythematous eczematous patches and plaques     Dystrophic onycholytic nail with subungual debris c/w onychomycosis     Umbilicated papule    Erythematous-base heme-crusted tan verrucoid plaque consistent with inflamed seborrheic keratosis     Erythematous Silvery Scaling Plaque c/w Psoriasis     See annotation            Assessment / Plan:        Rosacea, severe, flaring  -     doxycycline monohydrate 100 mg Tab; Take 1 po BID for 7 days then daily  Dispense: 37 tablet; Refill: 2  Vanicream cleanser and moisturizer  Suspect ocular rosacea  Rosacea triple cream ([facility]: ivermectin 1%, metronidazole 1% azelaic acid 20%). Prescription sent electronically.    Discussed benefits and risks of doxycyline therapy including but not limited to GI discomfort, esophageal irritation/ulceration, and increased sun sensitivity. Patient was counseled to take medicine with meals and at least 1 hour before lying down.     Seborrheic keratoses  These are benign inherited growths without a malignant potential. Reassurance given to patient. No treatment is necessary.              Follow up in about 6 weeks (around 5/5/2023).

## 2023-03-27 LAB
HPV HR 12 DNA SPEC QL NAA+PROBE: NEGATIVE
HPV16 AG SPEC QL: NEGATIVE
HPV18 DNA SPEC QL NAA+PROBE: NEGATIVE

## 2023-03-28 LAB
FINAL PATHOLOGIC DIAGNOSIS: NORMAL
Lab: NORMAL

## 2023-04-04 ENCOUNTER — OFFICE VISIT (OUTPATIENT)
Dept: FAMILY MEDICINE | Facility: CLINIC | Age: 58
End: 2023-04-04
Payer: COMMERCIAL

## 2023-04-04 VITALS
DIASTOLIC BLOOD PRESSURE: 60 MMHG | TEMPERATURE: 98 F | SYSTOLIC BLOOD PRESSURE: 110 MMHG | HEART RATE: 77 BPM | WEIGHT: 241.88 LBS | BODY MASS INDEX: 38.87 KG/M2 | HEIGHT: 66 IN | OXYGEN SATURATION: 98 %

## 2023-04-04 DIAGNOSIS — I10 PRIMARY HYPERTENSION: Primary | ICD-10-CM

## 2023-04-04 DIAGNOSIS — E66.9 OBESITY (BMI 35.0-39.9 WITHOUT COMORBIDITY): ICD-10-CM

## 2023-04-04 DIAGNOSIS — D64.9 NORMOCHROMIC NORMOCYTIC ANEMIA: ICD-10-CM

## 2023-04-04 DIAGNOSIS — E78.5 HYPERLIPIDEMIA, UNSPECIFIED HYPERLIPIDEMIA TYPE: ICD-10-CM

## 2023-04-04 DIAGNOSIS — E04.1 THYROID NODULE: ICD-10-CM

## 2023-04-04 DIAGNOSIS — R91.1 NODULE OF LEFT LUNG: ICD-10-CM

## 2023-04-04 PROCEDURE — 4010F PR ACE/ARB THEARPY RXD/TAKEN: ICD-10-PCS | Mod: CPTII,S$GLB,, | Performed by: NURSE PRACTITIONER

## 2023-04-04 PROCEDURE — 1160F RVW MEDS BY RX/DR IN RCRD: CPT | Mod: CPTII,S$GLB,, | Performed by: NURSE PRACTITIONER

## 2023-04-04 PROCEDURE — 3008F PR BODY MASS INDEX (BMI) DOCUMENTED: ICD-10-PCS | Mod: CPTII,S$GLB,, | Performed by: NURSE PRACTITIONER

## 2023-04-04 PROCEDURE — 99213 OFFICE O/P EST LOW 20 MIN: CPT | Mod: S$GLB,,, | Performed by: NURSE PRACTITIONER

## 2023-04-04 PROCEDURE — 1159F MED LIST DOCD IN RCRD: CPT | Mod: CPTII,S$GLB,, | Performed by: NURSE PRACTITIONER

## 2023-04-04 PROCEDURE — 99999 PR PBB SHADOW E&M-EST. PATIENT-LVL IV: CPT | Mod: PBBFAC,,, | Performed by: NURSE PRACTITIONER

## 2023-04-04 PROCEDURE — 3078F DIAST BP <80 MM HG: CPT | Mod: CPTII,S$GLB,, | Performed by: NURSE PRACTITIONER

## 2023-04-04 PROCEDURE — 3044F HG A1C LEVEL LT 7.0%: CPT | Mod: CPTII,S$GLB,, | Performed by: NURSE PRACTITIONER

## 2023-04-04 PROCEDURE — 3044F PR MOST RECENT HEMOGLOBIN A1C LEVEL <7.0%: ICD-10-PCS | Mod: CPTII,S$GLB,, | Performed by: NURSE PRACTITIONER

## 2023-04-04 PROCEDURE — 3074F SYST BP LT 130 MM HG: CPT | Mod: CPTII,S$GLB,, | Performed by: NURSE PRACTITIONER

## 2023-04-04 PROCEDURE — 3078F PR MOST RECENT DIASTOLIC BLOOD PRESSURE < 80 MM HG: ICD-10-PCS | Mod: CPTII,S$GLB,, | Performed by: NURSE PRACTITIONER

## 2023-04-04 PROCEDURE — 99999 PR PBB SHADOW E&M-EST. PATIENT-LVL IV: ICD-10-PCS | Mod: PBBFAC,,, | Performed by: NURSE PRACTITIONER

## 2023-04-04 PROCEDURE — 99213 PR OFFICE/OUTPT VISIT, EST, LEVL III, 20-29 MIN: ICD-10-PCS | Mod: S$GLB,,, | Performed by: NURSE PRACTITIONER

## 2023-04-04 PROCEDURE — 4010F ACE/ARB THERAPY RXD/TAKEN: CPT | Mod: CPTII,S$GLB,, | Performed by: NURSE PRACTITIONER

## 2023-04-04 PROCEDURE — 1159F PR MEDICATION LIST DOCUMENTED IN MEDICAL RECORD: ICD-10-PCS | Mod: CPTII,S$GLB,, | Performed by: NURSE PRACTITIONER

## 2023-04-04 PROCEDURE — 1160F PR REVIEW ALL MEDS BY PRESCRIBER/CLIN PHARMACIST DOCUMENTED: ICD-10-PCS | Mod: CPTII,S$GLB,, | Performed by: NURSE PRACTITIONER

## 2023-04-04 PROCEDURE — 3074F PR MOST RECENT SYSTOLIC BLOOD PRESSURE < 130 MM HG: ICD-10-PCS | Mod: CPTII,S$GLB,, | Performed by: NURSE PRACTITIONER

## 2023-04-04 PROCEDURE — 3008F BODY MASS INDEX DOCD: CPT | Mod: CPTII,S$GLB,, | Performed by: NURSE PRACTITIONER

## 2023-04-04 NOTE — PROGRESS NOTES
Subjective:       Patient ID: Carola Blanco is a 57 y.o. female.    Chief Complaint: Follow-up     HPI   56 y/o female patient with thyroid nodule and HTN (controlled on diet), hx of PE in 2018 following a knee surgery (treated with anticoagulation for 6 months), MTHFT Mutation presents for follow up of test result. No acute complaints reported.     Patient is followed by dermatology for rosacea, on doxycycline   Patient was evaluated by hematology for MTHFR mutation    Labs reviewed from 3/2023- Total cholesterol 237, TFT wnl    Patient Active Problem List   Diagnosis    Primary osteoarthritis of left knee    S/P total knee arthroplasty, left    Hypertension    Primary osteoarthritis of right knee    S/P total knee arthroplasty, right    Pulmonary embolus    Normochromic normocytic anemia    Nodule of left lung    Compound heterozygous MTHFR mutation [id number]    Homocysteinemia      Review of patient's allergies indicates:  No Known Allergies    Past Surgical History:   Procedure Laterality Date    JOINT REPLACEMENT      KNEE ARTHROPLASTY Right 10/04/2018    Procedure: ARTHROPLASTY, KNEE;  Surgeon: Markie Bang MD;  Location: [facility];  Service: Orthopedics;  Laterality: Right;    KNEE SURGERY Left 04/19/2018        Current Outpatient Medications:     doxycycline monohydrate 100 mg Tab, Take 1 po BID for 7 days then daily, Disp: 37 tablet, Rfl: 2    naproxen (NAPROSYN) 500 MG tablet, TAKE 1 TABLET BY MOUTH TWICE DAILY AS NEEDED, Disp: 60 tablet, Rfl: 2    zolpidem (AMBIEN) 5 MG Tab, Take 1 tablet (5 mg total) by mouth every evening., Disp: 30 tablet, Rfl: 3    Lab Results   Component Value Date    WBC 8.49 03/13/2023    HGB 14.5 03/13/2023    HCT 46.8 03/13/2023     03/13/2023    CHOL 237 (H) 03/13/2023    TRIG 129 03/13/2023    HDL 61 03/13/2023    ALT 14 03/13/2023    AST 18 03/13/2023     03/13/2023    K 4.4 03/13/2023     03/13/2023    CREATININE 0.9 03/13/2023    BUN 18 03/13/2023  "   CO2 28 03/13/2023    TSH 0.838 03/13/2023    INR 1.0 10/04/2018    HGBA1C 5.4 03/13/2023     Review of Systems   Constitutional:  Negative for chills and fever.   Respiratory:  Negative for chest tightness and shortness of breath.    Cardiovascular:  Negative for chest pain and palpitations.   Gastrointestinal:  Negative for abdominal pain.   Neurological:  Negative for dizziness and headaches.       Objective:   /60   Pulse 77   Temp 97.7 °F (36.5 °C) (Oral)   Ht 5' 6" (1.676 m)   Wt 109.7 kg (241 lb 13.5 oz)   SpO2 98%   BMI 39.03 kg/m²         Physical Exam  Constitutional:       General: She is not in acute distress.     Appearance: Normal appearance.   HENT:      Head: Atraumatic.   Cardiovascular:      Rate and Rhythm: Normal rate and regular rhythm.      Pulses: Normal pulses.      Heart sounds: Normal heart sounds.   Pulmonary:      Effort: Pulmonary effort is normal.      Breath sounds: Normal breath sounds.   Abdominal:      General: Abdomen is flat. Bowel sounds are normal.      Palpations: Abdomen is soft.   Skin:     General: Skin is warm and dry.   Neurological:      General: No focal deficit present.      Mental Status: She is alert and oriented to person, place, and time.   Psychiatric:         Mood and Affect: Mood normal.       Assessment:       1. Primary hypertension    2. Hyperlipidemia, unspecified hyperlipidemia type    3. Thyroid nodule    4. Nodule of left lung    5. Normochromic normocytic anemia    6. Obesity (BMI 35.0-39.9 without comorbidity)        Plan:       1. Hyperlipidemia, unspecified hyperlipidemia type  - Lipid Panel; Future    2. Primary hypertension  - stable, patient is not on medication, controlled on diet  - CBC Auto Differential; Future  - Comprehensive Metabolic Panel; Future    3. Thyroid nodule  - TSH; Future    4. Nodule of left lung    5. Normochromic normocytic anemia  - stable and continue to monitor    6. Obesity (BMI 35.0-39.9 without " comorbidity)  - continue with diet and exercise    Discussed regarding tetanus and shingles vax, flu vax but patient refused    Patient with be reevaluated in 6 months or sooner tati Fernandes NP

## 2023-05-12 ENCOUNTER — OFFICE VISIT (OUTPATIENT)
Dept: DERMATOLOGY | Facility: CLINIC | Age: 58
End: 2023-05-12
Payer: COMMERCIAL

## 2023-05-12 VITALS — HEIGHT: 66 IN | WEIGHT: 241 LBS | BODY MASS INDEX: 38.73 KG/M2

## 2023-05-12 DIAGNOSIS — L23.0 ALLERGIC CONTACT DERMATITIS DUE TO METALS: ICD-10-CM

## 2023-05-12 DIAGNOSIS — L71.9 ROSACEA: Primary | ICD-10-CM

## 2023-05-12 DIAGNOSIS — L71.8 OCULAR ROSACEA: ICD-10-CM

## 2023-05-12 PROCEDURE — 99999 PR PBB SHADOW E&M-EST. PATIENT-LVL III: ICD-10-PCS | Mod: PBBFAC,,, | Performed by: DERMATOLOGY

## 2023-05-12 PROCEDURE — 3008F PR BODY MASS INDEX (BMI) DOCUMENTED: ICD-10-PCS | Mod: CPTII,S$GLB,, | Performed by: DERMATOLOGY

## 2023-05-12 PROCEDURE — 99214 OFFICE O/P EST MOD 30 MIN: CPT | Mod: S$GLB,,, | Performed by: DERMATOLOGY

## 2023-05-12 PROCEDURE — 3044F PR MOST RECENT HEMOGLOBIN A1C LEVEL <7.0%: ICD-10-PCS | Mod: CPTII,S$GLB,, | Performed by: DERMATOLOGY

## 2023-05-12 PROCEDURE — 1159F MED LIST DOCD IN RCRD: CPT | Mod: CPTII,S$GLB,, | Performed by: DERMATOLOGY

## 2023-05-12 PROCEDURE — 4010F PR ACE/ARB THEARPY RXD/TAKEN: ICD-10-PCS | Mod: CPTII,S$GLB,, | Performed by: DERMATOLOGY

## 2023-05-12 PROCEDURE — 1160F RVW MEDS BY RX/DR IN RCRD: CPT | Mod: CPTII,S$GLB,, | Performed by: DERMATOLOGY

## 2023-05-12 PROCEDURE — 3044F HG A1C LEVEL LT 7.0%: CPT | Mod: CPTII,S$GLB,, | Performed by: DERMATOLOGY

## 2023-05-12 PROCEDURE — 4010F ACE/ARB THERAPY RXD/TAKEN: CPT | Mod: CPTII,S$GLB,, | Performed by: DERMATOLOGY

## 2023-05-12 PROCEDURE — 99214 PR OFFICE/OUTPT VISIT, EST, LEVL IV, 30-39 MIN: ICD-10-PCS | Mod: S$GLB,,, | Performed by: DERMATOLOGY

## 2023-05-12 PROCEDURE — 3008F BODY MASS INDEX DOCD: CPT | Mod: CPTII,S$GLB,, | Performed by: DERMATOLOGY

## 2023-05-12 PROCEDURE — 1160F PR REVIEW ALL MEDS BY PRESCRIBER/CLIN PHARMACIST DOCUMENTED: ICD-10-PCS | Mod: CPTII,S$GLB,, | Performed by: DERMATOLOGY

## 2023-05-12 PROCEDURE — 99999 PR PBB SHADOW E&M-EST. PATIENT-LVL III: CPT | Mod: PBBFAC,,, | Performed by: DERMATOLOGY

## 2023-05-12 PROCEDURE — 1159F PR MEDICATION LIST DOCUMENTED IN MEDICAL RECORD: ICD-10-PCS | Mod: CPTII,S$GLB,, | Performed by: DERMATOLOGY

## 2023-05-12 RX ORDER — SULFACETAMIDE SODIUM, SULFUR 100; 50 MG/G; MG/G
EMULSION TOPICAL
Qty: 170 G | Refills: 5 | Status: SHIPPED | OUTPATIENT
Start: 2023-05-12 | End: 2023-07-17

## 2023-05-12 RX ORDER — TRIAMCINOLONE ACETONIDE 1 MG/G
CREAM TOPICAL
Qty: 30 G | Refills: 2 | Status: SHIPPED | OUTPATIENT
Start: 2023-05-12

## 2023-05-12 NOTE — PROGRESS NOTES
"  Subjective:      Patient ID:  Carola Blanco is a 57 y.o. female who presents for   Chief Complaint   Patient presents with    Follow-up     Rosacea    Rash     Umbilicus      LOV 3/24/23- SK, Rosacea    Patient here today for F/U on Rosacea.  Pt sates she using [employer] Triple cream, "the best her face has looked in a while."  Pt states she has no concerns happy with progress.   C/O rash to umbilicus area x 1 week, itchy.  Pt states she used [employer] Feet cream, would help in the past. No resolution now.    Derm Hx:  Denies Phx of NMSC  Denies Fhx of MM    Current Outpatient Medications:   ·  doxycycline monohydrate 100 mg Tab, Take 1 po BID for 7 days then daily, Disp: 37 tablet, Rfl: 2  ·  naproxen (NAPROSYN) 500 MG tablet, TAKE 1 TABLET BY MOUTH TWICE DAILY AS NEEDED, Disp: 60 tablet, Rfl: 2  ·  zolpidem (AMBIEN) 5 MG Tab, Take 1 tablet (5 mg total) by mouth every evening., Disp: 30 tablet, Rfl: 3      Review of Systems   Constitutional:  Negative for fever, chills and fatigue.   Respiratory:  Negative for cough and shortness of breath.    Gastrointestinal:  Negative for nausea and vomiting.   Skin:  Negative for daily sunscreen use and activity-related sunscreen use.     Objective:   Physical Exam   Constitutional: She appears well-developed and well-nourished.   Neurological: She is alert and oriented to person, place, and time.   Psychiatric: She has a normal mood and affect.      Diagram Legend     Erythematous scaling macule/papule c/w actinic keratosis       Vascular papule c/w angioma      Pigmented verrucoid papule/plaque c/w seborrheic keratosis      Yellow umbilicated papule c/w sebaceous hyperplasia      Irregularly shaped tan macule c/w lentigo     1-2 mm smooth white papules consistent with Milia      Movable subcutaneous cyst with punctum c/w epidermal inclusion cyst      Subcutaneous movable cyst c/w pilar cyst      Firm pink to brown papule c/w dermatofibroma      Pedunculated fleshy " papule(s) c/w skin tag(s)      Evenly pigmented macule c/w junctional nevus     Mildly variegated pigmented, slightly irregular-bordered macule c/w mildly atypical nevus      Flesh colored to evenly pigmented papule c/w intradermal nevus       Pink pearly papule/plaque c/w basal cell carcinoma      Erythematous hyperkeratotic cursted plaque c/w SCC      Surgical scar with no sign of skin cancer recurrence      Open and closed comedones      Inflammatory papules and pustules      Verrucoid papule consistent consistent with wart     Erythematous eczematous patches and plaques     Dystrophic onycholytic nail with subungual debris c/w onychomycosis     Umbilicated papule    Erythematous-base heme-crusted tan verrucoid plaque consistent with inflamed seborrheic keratosis     Erythematous Silvery Scaling Plaque c/w Psoriasis     See annotation      Assessment / Plan:        Rosacea  -     sulfacetamide sodium-sulfur 10-5 % (w/w) Clsr; Use to wash face daily  Dispense: 170 g; Refill: 5  -Continue doxy 100 daily (holding currently for colonoscopy)  -Triple cream (Rosacea triple cream ([facility]: ivermectin 1%, metronidazole 1% azelaic acid 20%). Prescription sent electronically.)  - sensitive skin care routine and sun protection    Ocular rosacea  Symptoms improved, resume doxy 100    Allergic contact dermatitis due to metals  -     triamcinolone acetonide 0.1% (KENALOG) 0.1 % cream; Apply thin film to rash on abdomen BID PRN flare  Dispense: 30 g; Refill: 2  Blet buckle, yulissa button  Remove trigger  Tac to hasten resolution             No follow-ups on file.

## 2023-05-16 ENCOUNTER — ANESTHESIA (OUTPATIENT)
Dept: ENDOSCOPY | Facility: HOSPITAL | Age: 58
End: 2023-05-16
Payer: COMMERCIAL

## 2023-05-16 ENCOUNTER — HOSPITAL ENCOUNTER (OUTPATIENT)
Facility: HOSPITAL | Age: 58
Discharge: HOME | End: 2023-05-16
Attending: INTERNAL MEDICINE | Admitting: INTERNAL MEDICINE
Payer: COMMERCIAL

## 2023-05-16 ENCOUNTER — ANESTHESIA EVENT (OUTPATIENT)
Dept: ENDOSCOPY | Facility: HOSPITAL | Age: 58
End: 2023-05-16
Payer: COMMERCIAL

## 2023-05-16 DIAGNOSIS — Z12.11 SCREENING FOR COLON CANCER: Primary | ICD-10-CM

## 2023-05-16 PROCEDURE — 25000003 PHARM REV CODE 250: Performed by: INTERNAL MEDICINE

## 2023-05-16 PROCEDURE — G0121 COLON CA SCRN NOT HI RSK IND: HCPCS | Mod: ,,, | Performed by: INTERNAL MEDICINE

## 2023-05-16 PROCEDURE — 63600175 PHARM REV CODE 636 W HCPCS: Performed by: STUDENT IN AN ORGANIZED HEALTH CARE EDUCATION/TRAINING PROGRAM

## 2023-05-16 PROCEDURE — 37000008 HC ANESTHESIA 1ST 15 MINUTES: Performed by: INTERNAL MEDICINE

## 2023-05-16 PROCEDURE — 25000003 PHARM REV CODE 250: Performed by: STUDENT IN AN ORGANIZED HEALTH CARE EDUCATION/TRAINING PROGRAM

## 2023-05-16 PROCEDURE — D9220A PRA ANESTHESIA: Mod: ANES,,, | Performed by: ANESTHESIOLOGY

## 2023-05-16 PROCEDURE — G0121 COLON CA SCRN NOT HI RSK IND: HCPCS | Performed by: INTERNAL MEDICINE

## 2023-05-16 PROCEDURE — G0121 COLON CA SCRN NOT HI RSK IND: ICD-10-PCS | Mod: ,,, | Performed by: INTERNAL MEDICINE

## 2023-05-16 PROCEDURE — 37000009 HC ANESTHESIA EA ADD 15 MINS: Performed by: INTERNAL MEDICINE

## 2023-05-16 PROCEDURE — D9220A PRA ANESTHESIA: ICD-10-PCS | Mod: ANES,,, | Performed by: ANESTHESIOLOGY

## 2023-05-16 PROCEDURE — D9220A PRA ANESTHESIA: Mod: CRNA,,, | Performed by: STUDENT IN AN ORGANIZED HEALTH CARE EDUCATION/TRAINING PROGRAM

## 2023-05-16 PROCEDURE — D9220A PRA ANESTHESIA: ICD-10-PCS | Mod: CRNA,,, | Performed by: STUDENT IN AN ORGANIZED HEALTH CARE EDUCATION/TRAINING PROGRAM

## 2023-05-16 RX ORDER — SODIUM CHLORIDE 9 MG/ML
INJECTION, SOLUTION INTRAVENOUS CONTINUOUS
Status: DISCONTINUED | OUTPATIENT
Start: 2023-05-16 | End: 2023-05-16 | Stop reason: HOSPADM

## 2023-05-16 RX ORDER — LIDOCAINE HYDROCHLORIDE 20 MG/ML
INJECTION INTRAVENOUS
Status: DISCONTINUED | OUTPATIENT
Start: 2023-05-16 | End: 2023-05-16

## 2023-05-16 RX ORDER — PROPOFOL 10 MG/ML
VIAL (ML) INTRAVENOUS
Status: DISCONTINUED | OUTPATIENT
Start: 2023-05-16 | End: 2023-05-16

## 2023-05-16 RX ADMIN — PROPOFOL 20 MG: 10 INJECTION, EMULSION INTRAVENOUS at 09:05

## 2023-05-16 RX ADMIN — LIDOCAINE HYDROCHLORIDE 100 MG: 20 INJECTION, SOLUTION INTRAVENOUS at 09:05

## 2023-05-16 RX ADMIN — SODIUM CHLORIDE: 0.9 INJECTION, SOLUTION INTRAVENOUS at 09:05

## 2023-05-16 RX ADMIN — PROPOFOL 50 MG: 10 INJECTION, EMULSION INTRAVENOUS at 09:05

## 2023-05-16 NOTE — PLAN OF CARE
Patient is AAO, Vss, viviane po fluids, denies pain, ambulates easily. IV removed, catheter intact. Dr. Lima discussed results with patient and . Discharge instructions provided and states understanding. States ready to go home.

## 2023-05-16 NOTE — H&P
History & Physical - Short Stay  Gastroenterology      SUBJECTIVE:     Procedure: Colonoscopy    Chief Complaint/Indication for Procedure: Screening    PTA Medications   Medication Sig    doxycycline monohydrate 100 mg Tab Take 1 po BID for 7 days then daily    naproxen (NAPROSYN) 500 MG tablet TAKE 1 TABLET BY MOUTH TWICE DAILY AS NEEDED    zolpidem (AMBIEN) 5 MG Tab Take 1 tablet (5 mg total) by mouth every evening.    sulfacetamide sodium-sulfur 10-5 % (w/w) Clsr Use to wash face daily    triamcinolone acetonide 0.1% (KENALOG) 0.1 % cream Apply thin film to rash on abdomen BID PRN flare     Review of patient's allergies indicates:  No Known Allergies     Past Medical History:   Diagnosis Date    Arthritis     Compound heterozygous MTHFR mutation [id number] 12/12/2018    Homocysteinemia 12/12/2018    Hypertension     Nodule of left lung 10/17/2018    Normochromic normocytic anemia 10/17/2018     Past Surgical History:   Procedure Laterality Date    JOINT REPLACEMENT      KNEE ARTHROPLASTY Right 10/04/2018    Procedure: ARTHROPLASTY, KNEE;  Surgeon: Markie Bang MD;  Location: [facility];  Service: Orthopedics;  Laterality: Right;    KNEE SURGERY Left 04/19/2018     Family History   Problem Relation Age of Onset    Arthritis Mother     Birth defects Mother     Breast cancer Mother     Lung cancer Father         lung    Arthritis Maternal Grandmother      Social History     Tobacco Use    Smoking status: Never    Smokeless tobacco: Never   Substance Use Topics    Alcohol use: Never    Drug use: No     OBJECTIVE:     Vital Signs (Most Recent)  Temp: 98.4 °F (36.9 °C) (05/16/23 0825)  Pulse: 88 (05/16/23 0825)  Resp: 15 (05/16/23 0825)  BP: 132/88 (05/16/23 0825)  SpO2: 95 % (05/16/23 0825)    Physical Exam:                                                       GENERAL:  Comfortable, in no acute distress.                                 HEENT EXAM:  Nonicteric.  No adenopathy.  Oropharynx is clear.                NECK:  Supple.                                                               LUNGS:  Clear.                                                               CARDIAC:  Regular rate and rhythm.  S1, S2.  No murmur.                      ABDOMEN:  Soft, positive bowel sounds, nontender.  No hepatosplenomegaly or masses.  No rebound or guarding.                                             EXTREMITIES:  No edema.     MENTAL STATUS:  Normal, alert and oriented.    ASSESSMENT/PLAN:     Assessment: Screening    Plan: Colonoscopy

## 2023-05-16 NOTE — PROVATION PATIENT INSTRUCTIONS
Discharge Summary/Instructions after an Endoscopic Procedure  Patient Name: Carola Blanco  Patient MRN: [id number]  Patient YOB: 1965  Tuesday, May 16, 2023  Timothy Lima MD  Dear patient,  As a result of recent federal legislation (The Federal Cures Act), you may   receive lab or pathology results from your procedure in your [facility]   account before your physician is able to contact you. Your physician or   their representative will relay the results to you with their   recommendations at their soonest availability.  Thank you,  RESTRICTIONS:  During your procedure today, you received medications for sedation.  These   medications may affect your judgment, balance and coordination.  Therefore,   for 24 hours, you have the following restrictions:   - DO NOT drive a car, operate machinery, make legal/financial decisions,   sign important papers or drink alcohol.    ACTIVITY:  Today: no heavy lifting, straining or running due to procedural   sedation/anesthesia.  The following day: return to full activity including work.  DIET:  Eat and drink normally unless instructed otherwise.     TREATMENT FOR COMMON SIDE EFFECTS:  - Mild abdominal pain, nausea, belching, bloating or excessive gas:  rest,   eat lightly and use a heating pad.  - Sore Throat: treat with throat lozenges and/or gargle with warm salt   water.  - Because air was used during the procedure, expelling large amounts of air   from your rectum or belching is normal.  - If a bowel prep was taken, you may not have a bowel movement for 1-3 days.    This is normal.  SYMPTOMS TO WATCH FOR AND REPORT TO YOUR PHYSICIAN:  1. Abdominal pain or bloating, other than gas cramps.  2. Chest pain.  3. Back pain.  4. Signs of infection such as: chills or fever occurring within 24 hours   after the procedure.  5. Rectal bleeding, which would show as bright red, maroon, or black stools.   (A tablespoon of blood from the rectum is not serious, especially if    hemorrhoids are present.)  6. Vomiting.  7. Weakness or dizziness.  GO DIRECTLY TO THE NEAREST EMERGENCY ROOM IF YOU HAVE ANY OF THE FOLLOWING:      Difficulty breathing              Chills and/or fever over 101 F   Persistent vomiting and/or vomiting blood   Severe abdominal pain   Severe chest pain   Black, tarry stools   Bleeding- more than one tablespoon   Any other symptom or condition that you feel may need urgent attention  Your doctor recommends these additional instructions:  If any biopsies were taken, your doctors clinic will contact you in 1 to 2   weeks with any results.  - Discharge patient to home.   - Advance diet as tolerated.   - Continue present medications.   - Repeat colonoscopy in 10 years for screening purposes.   - Patient has a contact number available for emergencies.  The signs and   symptoms of potential delayed complications were discussed with the   patient.  Return to normal activities tomorrow.  Written discharge   instructions were provided to the patient.  For questions, problems or results please call your physician - Timothy Lima MD at Work:  [phone].  [facility] RO[facility] PHONE NUMBER: [phone]  IF A COMPLICATION OR EMERGENCY SITUATION ARISES AND YOU ARE UNABLE TO REACH   YOUR PHYSICIAN - GO DIRECTLY TO THE EMERGENCY ROOM.  Timothy Lima MD  5/16/2023 9:40:44 AM  This report has been verified and signed electronically.  Dear patient,  As a result of recent federal legislation (The Federal Cures Act), you may   receive lab or pathology results from your procedure in your [facility]   account before your physician is able to contact you. Your physician or   their representative will relay the results to you with their   recommendations at their soonest availability.  Thank you,  PROVATION

## 2023-05-16 NOTE — TRANSFER OF CARE
"Anesthesia Transfer of Care Note    Patient: Carola Blanco    Procedure(s) Performed: Procedure(s) (LRB):  COLONOSCOPY (N/A)    Anesthesia Type: general    Transport from OR: Transported from OR on room air with adequate spontaneous ventilation    Post pain: adequate analgesia    Post assessment: no apparent anesthetic complications    Post vital signs: stable    Level of consciousness: awake and alert    Nausea/Vomiting: no nausea/vomiting    Complications: none    Transfer of care protocol was followed      Last vitals:   Visit Vitals  /88 (BP Location: Left arm, Patient Position: Lying)   Pulse 88   Temp 36.9 °C (98.4 °F) (Skin)   Resp 15   Ht 5' 6" (1.676 m)   Wt 109.3 kg (241 lb)   SpO2 95%   Breastfeeding No   BMI 38.90 kg/m²     "

## 2023-05-16 NOTE — ANESTHESIA PREPROCEDURE EVALUATION
05/16/2023  Carola Blanco is a 57 y.o., female.      Pre-op Assessment    I have reviewed the NPO Status.   I have reviewed the Medications.     Review of Systems  Anesthesia Hx:  No problems with previous Anesthesia    Cardiovascular:   Hypertension    Pulmonary:  Pulmonary Normal    Hepatic/GI:   Bowel Prep.    Musculoskeletal:   Arthritis     Endocrine:  Obesity / BMI > 30         Anesthesia Plan  Type of Anesthesia, risks & benefits discussed:    Anesthesia Type: Gen Natural Airway  Intra-op Monitoring Plan: Standard ASA Monitors  Induction:  IV  Informed Consent: Informed consent signed with the Patient and all parties understand the risks and agree with anesthesia plan.  All questions answered.   ASA Score: 3    Ready For Surgery From Anesthesia Perspective.     .

## 2023-05-17 VITALS
RESPIRATION RATE: 16 BRPM | HEART RATE: 67 BPM | SYSTOLIC BLOOD PRESSURE: 137 MMHG | HEIGHT: 66 IN | BODY MASS INDEX: 38.73 KG/M2 | TEMPERATURE: 98 F | OXYGEN SATURATION: 97 % | DIASTOLIC BLOOD PRESSURE: 79 MMHG | WEIGHT: 241 LBS

## 2023-05-19 ENCOUNTER — TELEPHONE (OUTPATIENT)
Dept: DERMATOLOGY | Facility: CLINIC | Age: 58
End: 2023-05-19
Payer: COMMERCIAL

## 2023-05-19 NOTE — TELEPHONE ENCOUNTER
----- Message from Sunni Bishop sent at 5/19/2023 11:54 AM CDT -----  Type: Needs Medical Advice  Who Called:  pt     Best Call Back Number: [phone]    Additional Information: pt is requesting a call back in regards to her pharm needing authorization for prescription . Please advise

## 2023-05-22 ENCOUNTER — TELEPHONE (OUTPATIENT)
Dept: DERMATOLOGY | Facility: CLINIC | Age: 58
End: 2023-05-22
Payer: COMMERCIAL

## 2023-05-22 NOTE — TELEPHONE ENCOUNTER
Sulfacetamide Sodium-Sulfur 10-5% liquid is excluded from patients plan, can you send in something else for her rosacea?

## 2023-05-24 ENCOUNTER — PATIENT MESSAGE (OUTPATIENT)
Dept: DERMATOLOGY | Facility: CLINIC | Age: 58
End: 2023-05-24
Payer: COMMERCIAL

## 2023-05-24 ENCOUNTER — TELEPHONE (OUTPATIENT)
Dept: DERMATOLOGY | Facility: CLINIC | Age: 58
End: 2023-05-24
Payer: COMMERCIAL

## 2023-05-24 NOTE — TELEPHONE ENCOUNTER
Contacted pt in regards to PA for Sulfa Wash. Pt is aware that there is nothing  can send it as a replacement. Pt is aware to buy OTC Sulfa bar soap. Pt voices understanding.

## 2023-05-24 NOTE — TELEPHONE ENCOUNTER
----- Message from Carmen Bui sent at 5/24/2023  4:06 PM CDT -----  Name of Who is Calling: LEYLA NAVARRETE [[id number]]       What is the request in detail: pt stated that she is needing a PA for facial wash , please assist with this matter        Can the clinic reply by [facility]:  yes        What Number to Call Back if not in [facility]: [phone]

## 2023-05-26 NOTE — TELEPHONE ENCOUNTER
There is no other prescription wash available. She can check good rx price, I think it is very reasonable. Or buy a sulfur bar of soap on amazon.com

## 2023-06-07 DIAGNOSIS — L71.9 ROSACEA: ICD-10-CM

## 2023-06-07 RX ORDER — DOXYCYCLINE 100 MG/1
TABLET ORAL
Qty: 30 TABLET | Refills: 2 | Status: SHIPPED | OUTPATIENT
Start: 2023-06-07

## 2023-07-17 ENCOUNTER — OFFICE VISIT (OUTPATIENT)
Dept: ORTHOPEDICS | Facility: CLINIC | Age: 58
End: 2023-07-17
Payer: COMMERCIAL

## 2023-07-17 VITALS — WEIGHT: 241 LBS | HEIGHT: 66 IN | BODY MASS INDEX: 38.73 KG/M2

## 2023-07-17 DIAGNOSIS — Z96.651 HISTORY OF TOTAL RIGHT KNEE REPLACEMENT: ICD-10-CM

## 2023-07-17 DIAGNOSIS — Z96.652 HISTORY OF TOTAL LEFT KNEE REPLACEMENT: Primary | ICD-10-CM

## 2023-07-17 PROCEDURE — 3044F HG A1C LEVEL LT 7.0%: CPT | Mod: CPTII,S$GLB,, | Performed by: ORTHOPAEDIC SURGERY

## 2023-07-17 PROCEDURE — 4010F ACE/ARB THERAPY RXD/TAKEN: CPT | Mod: CPTII,S$GLB,, | Performed by: ORTHOPAEDIC SURGERY

## 2023-07-17 PROCEDURE — 1159F PR MEDICATION LIST DOCUMENTED IN MEDICAL RECORD: ICD-10-PCS | Mod: CPTII,S$GLB,, | Performed by: ORTHOPAEDIC SURGERY

## 2023-07-17 PROCEDURE — 99213 OFFICE O/P EST LOW 20 MIN: CPT | Mod: S$GLB,,, | Performed by: ORTHOPAEDIC SURGERY

## 2023-07-17 PROCEDURE — 1160F PR REVIEW ALL MEDS BY PRESCRIBER/CLIN PHARMACIST DOCUMENTED: ICD-10-PCS | Mod: CPTII,S$GLB,, | Performed by: ORTHOPAEDIC SURGERY

## 2023-07-17 PROCEDURE — 3044F PR MOST RECENT HEMOGLOBIN A1C LEVEL <7.0%: ICD-10-PCS | Mod: CPTII,S$GLB,, | Performed by: ORTHOPAEDIC SURGERY

## 2023-07-17 PROCEDURE — 3008F PR BODY MASS INDEX (BMI) DOCUMENTED: ICD-10-PCS | Mod: CPTII,S$GLB,, | Performed by: ORTHOPAEDIC SURGERY

## 2023-07-17 PROCEDURE — 99213 PR OFFICE/OUTPT VISIT, EST, LEVL III, 20-29 MIN: ICD-10-PCS | Mod: S$GLB,,, | Performed by: ORTHOPAEDIC SURGERY

## 2023-07-17 PROCEDURE — 1160F RVW MEDS BY RX/DR IN RCRD: CPT | Mod: CPTII,S$GLB,, | Performed by: ORTHOPAEDIC SURGERY

## 2023-07-17 PROCEDURE — 1159F MED LIST DOCD IN RCRD: CPT | Mod: CPTII,S$GLB,, | Performed by: ORTHOPAEDIC SURGERY

## 2023-07-17 PROCEDURE — 3008F BODY MASS INDEX DOCD: CPT | Mod: CPTII,S$GLB,, | Performed by: ORTHOPAEDIC SURGERY

## 2023-07-17 PROCEDURE — 4010F PR ACE/ARB THEARPY RXD/TAKEN: ICD-10-PCS | Mod: CPTII,S$GLB,, | Performed by: ORTHOPAEDIC SURGERY

## 2023-07-17 RX ORDER — IBUPROFEN 200 MG
200 TABLET ORAL EVERY 6 HOURS PRN
COMMUNITY

## 2023-07-17 RX ORDER — LISINOPRIL 10 MG/1
10 TABLET ORAL
COMMUNITY
Start: 2023-06-09

## 2023-07-17 NOTE — PROGRESS NOTES
Subjective:       Patient ID: Carola Blanco is a 57 y.o. female.    Chief Complaint: Pain of the Left Knee (Patient is here for a check up on bilateral knee's, Hx of bilat TKA 4/19/ 2018 & 10/4/2018, no issue's other than a little stiffness) and Pain of the Right Knee      History of Present Illness    Prior to meeting with the patient I reviewed the medical chart in [facility]. This included reviewing the previous progress notes from our office, review of the patient's last appointment with their primary care provider, review of any visits to the emergency room, and review of any pain management appointments or procedures.   Status post bilateral total knee replacements 5 years ago doing well no complaints just comes in for checkup    Current Medications  Current Outpatient Medications   Medication Sig Dispense Refill    doxycycline monohydrate 100 mg Tab Take 1 po daily 30 tablet 2    ibuprofen (ADVIL,MOTRIN) 200 MG tablet Take 200 mg by mouth every 6 (six) hours as needed for Pain.      lisinopriL 10 MG tablet Take 10 mg by mouth.      naproxen (NAPROSYN) 500 MG tablet TAKE 1 TABLET BY MOUTH TWICE DAILY AS NEEDED 60 tablet 2    triamcinolone acetonide 0.1% (KENALOG) 0.1 % cream Apply thin film to rash on abdomen BID PRN flare 30 g 2    zolpidem (AMBIEN) 5 MG Tab Take 1 tablet (5 mg total) by mouth every evening. 30 tablet 3     No current facility-administered medications for this visit.       Allergies  Review of patient's allergies indicates:  No Known Allergies    Past Medical History  Past Medical History:   Diagnosis Date    Arthritis     Compound heterozygous MTHFR mutation [id number] 12/12/2018    Homocysteinemia 12/12/2018    Hypertension     Nodule of left lung 10/17/2018    Normochromic normocytic anemia 10/17/2018       Surgical History  Past Surgical History:   Procedure Laterality Date    COLONOSCOPY N/A 5/16/2023    Procedure: COLONOSCOPY;  Surgeon: Timothy Lima MD;  Location: [facility];  Service:  Endoscopy;  Laterality: N/A;    JOINT REPLACEMENT      KNEE ARTHROPLASTY Right 10/04/2018    Procedure: ARTHROPLASTY, KNEE;  Surgeon: Marike Bang MD;  Location: [facility];  Service: Orthopedics;  Laterality: Right;    KNEE SURGERY Left 04/19/2018       Family History:   Family History   Problem Relation Age of Onset    Arthritis Mother     Birth defects Mother     Breast cancer Mother     Lung cancer Father         lung    Arthritis Maternal Grandmother        Social History:   Social History     Socioeconomic History    Marital status:    Tobacco Use    Smoking status: Never    Smokeless tobacco: Never   Substance and Sexual Activity    Alcohol use: Never    Drug use: No    Sexual activity: Yes     Partners: Male     Birth control/protection: Post-menopausal       Hospitalization/Major Diagnostic Procedure:     Review of Systems     General/Constitutional:  Chills denies. Fatigue denies. Fever denies. Weight gain denies. Weight loss denies.    Respiratory:  Shortness of breath denies.    Cardiovascular:  Chest pain denies.    Gastrointestinal:  Constipation denies. Diarrhea denies. Nausea denies. Vomiting denies.     Hematology:  Easy bruising denies. Prolonged bleeding denies.     Genitourinary:  Frequent urination denies. Pain in lower back denies. Painful urination denies.     Musculoskeletal:  See HPI for details    Skin:  Rash denies.    Neurologic:  Dizziness denies. Gait abnormalities denies. Seizures denies. Tingling/Numbess denies.    Psychiatric:  Anxiety denies. Depressed mood denies.     Objective:   Vital Signs: There were no vitals filed for this visit.     Physical Exam      General Examination:     Constitutional: The patient is alert and oriented to lace person and time. Mood is pleasant.     Head/Face: Normal facial features normal eyebrows    Eyes: Normal extraocular motion bilaterally    Lungs: Respirations are equal and unlabored    Gait is coordinated.    Cardiovascular: There are no  swelling or varicosities present.    Lymphatic: Negative for adenopathy    Skin: Normal    Neurological: Level of consciousness normal. Oriented to place person and time and situation    Psychiatric: Oriented to time place person and situation    Incisions both knees well-healed range of motion nonpainful no swelling instability  XRAY Report/ Interpretation:  AP lateral views both knees were taken today of total knee replacements in good position no signs of hardware loosening      Assessment:       1. Primary osteoarthritis of left knee    2. Primary osteoarthritis of right knee        Plan:       Carola was seen today for pain and pain.    Diagnoses and all orders for this visit:    Primary osteoarthritis of left knee  -     X-Ray Knee 3 View Bilateral    Primary osteoarthritis of right knee  -     X-Ray Knee 3 View Bilateral         No follow-ups on file.    Return 2-3 years for follow-up or as needed  Treatment options were discussed with regards to the nature of the medical condition. Conservative pain intervention and surgical options were discussed in detail. The probability of success of each separate treatment option was discussed. The patient expressed a clear understanding of the treatment options. With regards to surgery, the procedure risk, benefits, complications, and outcomes were discussed. No guarantees were given with regards to surgical outcome.   The risk of complications, morbidity, and mortality of patient management decisions have been made at the time of this visit. These are associated with the patient's problems, diagnostic procedures and treatment options. This includes the possible management options selected and those considered but not selected by the patient after shared medical decision making we discussed with the patient.     This note was created using Dragon voice recognition software that occasionally misinterpreted phrases or words.

## 2024-04-03 DIAGNOSIS — Z12.31 ENCOUNTER FOR SCREENING MAMMOGRAM FOR MALIGNANT NEOPLASM OF BREAST: Primary | ICD-10-CM

## 2024-04-04 ENCOUNTER — HOSPITAL ENCOUNTER (OUTPATIENT)
Dept: RADIOLOGY | Facility: HOSPITAL | Age: 59
Discharge: HOME | End: 2024-04-04
Attending: NURSE PRACTITIONER
Payer: COMMERCIAL

## 2024-04-04 DIAGNOSIS — Z12.31 ENCOUNTER FOR SCREENING MAMMOGRAM FOR MALIGNANT NEOPLASM OF BREAST: ICD-10-CM

## 2024-04-04 PROCEDURE — 77067 SCR MAMMO BI INCL CAD: CPT | Mod: TC,PO

## 2024-06-03 ENCOUNTER — OFFICE VISIT (OUTPATIENT)
Dept: OBSTETRICS AND GYNECOLOGY | Facility: CLINIC | Age: 59
End: 2024-06-03
Payer: COMMERCIAL

## 2024-06-03 VITALS
WEIGHT: 236.69 LBS | SYSTOLIC BLOOD PRESSURE: 108 MMHG | HEIGHT: 66 IN | BODY MASS INDEX: 38.04 KG/M2 | DIASTOLIC BLOOD PRESSURE: 70 MMHG

## 2024-06-03 DIAGNOSIS — Z01.419 WELL WOMAN EXAM: Primary | ICD-10-CM

## 2024-06-03 DIAGNOSIS — N95.2 VAGINAL ATROPHY: ICD-10-CM

## 2024-06-03 PROCEDURE — 99396 PREV VISIT EST AGE 40-64: CPT | Mod: S$GLB,,, | Performed by: GENERAL PRACTICE

## 2024-06-03 PROCEDURE — 3078F DIAST BP <80 MM HG: CPT | Mod: CPTII,S$GLB,, | Performed by: GENERAL PRACTICE

## 2024-06-03 PROCEDURE — 99999 PR PBB SHADOW E&M-EST. PATIENT-LVL III: CPT | Mod: PBBFAC,,, | Performed by: GENERAL PRACTICE

## 2024-06-03 PROCEDURE — 3074F SYST BP LT 130 MM HG: CPT | Mod: CPTII,S$GLB,, | Performed by: GENERAL PRACTICE

## 2024-06-03 PROCEDURE — 1159F MED LIST DOCD IN RCRD: CPT | Mod: CPTII,S$GLB,, | Performed by: GENERAL PRACTICE

## 2024-06-03 PROCEDURE — 3008F BODY MASS INDEX DOCD: CPT | Mod: CPTII,S$GLB,, | Performed by: GENERAL PRACTICE

## 2024-06-03 RX ORDER — ESTRADIOL 0.1 MG/G
1 CREAM VAGINAL DAILY
Qty: 90 G | Refills: 3 | Status: SHIPPED | OUTPATIENT
Start: 2024-06-03 | End: 2025-06-03

## 2024-06-03 NOTE — PROGRESS NOTES
HISTORY OF THE PRESENT ILLNESS    2024  Carola Blanco is a 58 y.o. here for WWE.  No acute questions or concerns.    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 , no longer on blood thinners    PAST SURGICAL HISTORY  Several knee surgeries    ALLERGIES  Review of patient's allergies indicates:  No Known Allergies    MEDICATIONS  Current Outpatient Medications   Medication Instructions    ibuprofen (ADVIL,MOTRIN) 200 mg, Oral, Every 6 hours PRN    lisinopriL 10 mg, Oral    naproxen (NAPROSYN) 500 MG tablet TAKE 1 TABLET BY MOUTH TWICE DAILY AS NEEDED    zolpidem (AMBIEN) 5 mg, Oral, Nightly       SOCIAL HISTORY  Lives with:   Smoker: non-smoker  Alcohol: denies  Drugs: denies  Domestic Violence: no  Occupation:      FAMILY HISTORY  BREAST/UTERINE/OVARIAN CANCER: mother had breast CA  age 71 from it (dx'ed age 69), previously she had cervical CA  COLON CA: none  BLEEDING or  CLOTTING DISORDERS: none    --------------------------------------------------------------------------------------------------------------    PHYSICAL EXAM  VITALS:  Vitals:    24 1553   BP: 108/70       GEN = alert/oriented, nad, pleasant  HEENT = sclera anicteric, EOM grossly normal  BREASTS = declined, no concerns   =      External: nefg, moderately atrophic     Vagina: moderately atrophied and without lesions and urethral  meatus normal     Discharge: minimal     Cervix: normal-appearing     Bimanual: uterus normal size and nontender, no adnexal masses or tenderness      ASSESSMENT AND PLAN:  58 y.o. for WWE.  Atrophy noted on exam and reports vaginal dryness.  Discussed lubricants, moisturizers, and local estrogen.  She is interested in trying estrogen.    Rx: estrace vaginal    next cervical CA screen due MAR 2028    MMG up to date  Encouraged self breast awareness; RTC for breast concerns    RTC for periodic GYN exam, sooner prn    MD CAROLINE

## 2024-07-18 ENCOUNTER — TELEPHONE (OUTPATIENT)
Dept: FAMILY MEDICINE | Facility: CLINIC | Age: 59
End: 2024-07-18
Payer: COMMERCIAL

## 2024-07-18 NOTE — TELEPHONE ENCOUNTER
July 26 at 1:30 dr morley.    She been to nurse maria a mann so she is considered established with us since that's [facility]        Sent apt to syed and keith aguilera ma to over ride schedule     ----- Message from Montse Echevarria sent at 7/18/2024  1:24 PM CDT -----  Type:  Appointment Request    Caller is requesting an appointment.      Name of Caller:  Pt    Symptoms:  est care    Would the patient rather a call back or a response via [facility]?   Call back    Best Call Back Number:   [phone]    Additional Information:  Pt is trying to est care but needs an afternoon appt and only morning appts are being offered.   Please call back to advise. Thanks!

## 2024-07-26 ENCOUNTER — HOSPITAL ENCOUNTER (OUTPATIENT)
Dept: RADIOLOGY | Facility: HOSPITAL | Age: 59
Discharge: HOME | End: 2024-07-26
Attending: FAMILY MEDICINE
Payer: COMMERCIAL

## 2024-07-26 ENCOUNTER — OFFICE VISIT (OUTPATIENT)
Dept: FAMILY MEDICINE | Facility: CLINIC | Age: 59
End: 2024-07-26
Payer: COMMERCIAL

## 2024-07-26 VITALS
WEIGHT: 243.81 LBS | TEMPERATURE: 98 F | BODY MASS INDEX: 39.18 KG/M2 | OXYGEN SATURATION: 95 % | DIASTOLIC BLOOD PRESSURE: 74 MMHG | HEART RATE: 79 BPM | HEIGHT: 66 IN | SYSTOLIC BLOOD PRESSURE: 128 MMHG

## 2024-07-26 DIAGNOSIS — Z96.651 S/P TOTAL KNEE ARTHROPLASTY, RIGHT: ICD-10-CM

## 2024-07-26 DIAGNOSIS — M54.2 ARTHRALGIA, CERVICAL SPINE: ICD-10-CM

## 2024-07-26 DIAGNOSIS — K21.9 GASTROESOPHAGEAL REFLUX DISEASE, UNSPECIFIED WHETHER ESOPHAGITIS PRESENT: ICD-10-CM

## 2024-07-26 DIAGNOSIS — R13.10 DYSPHAGIA, UNSPECIFIED TYPE: ICD-10-CM

## 2024-07-26 DIAGNOSIS — Z96.652 S/P TOTAL KNEE ARTHROPLASTY, LEFT: ICD-10-CM

## 2024-07-26 DIAGNOSIS — I10 ESSENTIAL HYPERTENSION: ICD-10-CM

## 2024-07-26 DIAGNOSIS — E04.1 THYROID NODULE: ICD-10-CM

## 2024-07-26 DIAGNOSIS — Z00.00 PHYSICAL EXAM: Primary | ICD-10-CM

## 2024-07-26 DIAGNOSIS — G47.00 INSOMNIA, UNSPECIFIED TYPE: ICD-10-CM

## 2024-07-26 PROCEDURE — 90471 IMMUNIZATION ADMIN: CPT | Mod: S$GLB,,, | Performed by: FAMILY MEDICINE

## 2024-07-26 PROCEDURE — 72050 X-RAY EXAM NECK SPINE 4/5VWS: CPT | Mod: 26,,, | Performed by: RADIOLOGY

## 2024-07-26 PROCEDURE — 3044F HG A1C LEVEL LT 7.0%: CPT | Mod: CPTII,S$GLB,, | Performed by: FAMILY MEDICINE

## 2024-07-26 PROCEDURE — 3078F DIAST BP <80 MM HG: CPT | Mod: CPTII,S$GLB,, | Performed by: FAMILY MEDICINE

## 2024-07-26 PROCEDURE — 3074F SYST BP LT 130 MM HG: CPT | Mod: CPTII,S$GLB,, | Performed by: FAMILY MEDICINE

## 2024-07-26 PROCEDURE — 1160F RVW MEDS BY RX/DR IN RCRD: CPT | Mod: CPTII,S$GLB,, | Performed by: FAMILY MEDICINE

## 2024-07-26 PROCEDURE — 3008F BODY MASS INDEX DOCD: CPT | Mod: CPTII,S$GLB,, | Performed by: FAMILY MEDICINE

## 2024-07-26 PROCEDURE — 99999 PR PBB SHADOW E&M-EST. PATIENT-LVL V: CPT | Mod: PBBFAC,,, | Performed by: FAMILY MEDICINE

## 2024-07-26 PROCEDURE — 72050 X-RAY EXAM NECK SPINE 4/5VWS: CPT | Mod: TC

## 2024-07-26 PROCEDURE — 1159F MED LIST DOCD IN RCRD: CPT | Mod: CPTII,S$GLB,, | Performed by: FAMILY MEDICINE

## 2024-07-26 PROCEDURE — 99213 OFFICE O/P EST LOW 20 MIN: CPT | Mod: 25,S$GLB,, | Performed by: FAMILY MEDICINE

## 2024-07-26 PROCEDURE — 99396 PREV VISIT EST AGE 40-64: CPT | Mod: 25,S$GLB,, | Performed by: FAMILY MEDICINE

## 2024-07-26 PROCEDURE — 90750 HZV VACC RECOMBINANT IM: CPT | Mod: S$GLB,,, | Performed by: FAMILY MEDICINE

## 2024-07-26 RX ORDER — ZOLPIDEM TARTRATE 10 MG/1
10 TABLET ORAL NIGHTLY
COMMUNITY
Start: 2024-06-24

## 2024-07-26 RX ORDER — PANTOPRAZOLE SODIUM 40 MG/1
40 TABLET, DELAYED RELEASE ORAL DAILY
Qty: 90 TABLET | Refills: 1 | Status: SHIPPED | OUTPATIENT
Start: 2024-07-26 | End: 2025-07-26

## 2024-07-26 RX ORDER — ZOLPIDEM TARTRATE 5 MG/1
10 TABLET ORAL NIGHTLY PRN
Qty: 30 TABLET | Refills: 2 | Status: SHIPPED | OUTPATIENT
Start: 2024-07-26 | End: 2025-01-24

## 2024-07-26 NOTE — PATIENT INSTRUCTIONS
Mendy Floyd MD  [address] [facility]  [address] [place] [id number]  Phone: [phone]  Fax: [phone]

## 2024-07-28 ENCOUNTER — HOSPITAL ENCOUNTER (OUTPATIENT)
Dept: RADIOLOGY | Facility: HOSPITAL | Age: 59
Discharge: HOME | End: 2024-07-28
Attending: FAMILY MEDICINE
Payer: COMMERCIAL

## 2024-07-28 DIAGNOSIS — E04.1 THYROID NODULE: ICD-10-CM

## 2024-07-28 PROBLEM — K21.9 GASTROESOPHAGEAL REFLUX DISEASE: Status: ACTIVE | Noted: 2024-07-28

## 2024-07-28 PROCEDURE — 76536 US EXAM OF HEAD AND NECK: CPT | Mod: TC

## 2024-07-28 PROCEDURE — 76536 US EXAM OF HEAD AND NECK: CPT | Mod: 26,,, | Performed by: RADIOLOGY

## 2024-07-28 NOTE — PROGRESS NOTES
Subjective:       Patient ID: Carola Blanco is a 58 y.o. female.    Chief Complaint: Establish Care    Here for new patient visit.      Social history nonsmoker some alcohol use.  Not heavy.  Manager at local hotel.      Family history mother breast cancer .  Father lung cancer .    Immunizations TD AP date unknown.  Needs shingles vaccine.    Past medical history.  Right and left knee replacements in 2018 and 2019.  Hypertension on medications since 2018.   3 para 3 A/B 0.  Insomnia issues on Ambien 10 mg HS since about .  Cervical arthralgia and anterior neck swelling had an ultrasound in 2023.  Has thyroid nodule with check of this due.  Some dysphagia for hard foods.  This is been since she took Ozempic couple of months ago.  Gastroesophageal reflux disease colonoscopy May of 2023 repeat recommended in 10 years.  Does have heartburn.      Review of Systems   Constitutional: Negative.    HENT: Negative.     Eyes: Negative.    Respiratory: Negative.     Cardiovascular: Negative.    Gastrointestinal: Negative.         Some dysphagia.  Some heartburn.   Endocrine: Negative.    Genitourinary: Negative.    Musculoskeletal: Negative.    Skin: Negative.    Allergic/Immunologic: Negative.    Neurological: Negative.    Hematological: Negative.    Psychiatric/Behavioral: Negative.     All other systems reviewed and are negative.      Objective:      Physical Exam  Vitals and nursing note reviewed.   Constitutional:       Appearance: Normal appearance. She is well-developed. She is obese.   HENT:      Head: Normocephalic and atraumatic.      Right Ear: Tympanic membrane normal.      Left Ear: Tympanic membrane normal.      Nose: Nose normal.      Mouth/Throat:      Mouth: Mucous membranes are moist.   Eyes:      Conjunctiva/sclera: Conjunctivae normal.      Pupils: Pupils are equal, round, and reactive to light.   Neck:      Vascular: No carotid bruit.      Comments: Thyroid is somewhat  full.  Cardiovascular:      Rate and Rhythm: Normal rate and regular rhythm.      Pulses: Normal pulses.      Heart sounds: Normal heart sounds. No murmur heard.     No gallop.   Pulmonary:      Effort: Pulmonary effort is normal.      Breath sounds: Normal breath sounds.   Abdominal:      General: Bowel sounds are normal.      Palpations: Abdomen is soft.      Tenderness: There is no abdominal tenderness.   Musculoskeletal:         General: Normal range of motion.      Cervical back: Normal range of motion.      Right lower leg: No edema.      Left lower leg: No edema.   Lymphadenopathy:      Cervical: No cervical adenopathy.   Skin:     General: Skin is warm and dry.   Neurological:      General: No focal deficit present.      Mental Status: She is alert and oriented to person, place, and time.   Psychiatric:         Behavior: Behavior normal.         Thought Content: Thought content normal.         Judgment: Judgment normal.         Assessment:       1. Physical exam    2. S/P total knee arthroplasty, right    3. S/P total knee arthroplasty, left    4. Essential hypertension    5. Insomnia, unspecified type    6. Arthralgia, cervical spine    7. Thyroid nodule    8. Dysphagia, unspecified type    9. Gastroesophageal reflux disease, unspecified whether esophagitis present        Plan:       Physical exam  -     CBC Auto Differential; Future; Expected date: 07/26/2024  -     Comprehensive Metabolic Panel; Future; Expected date: 07/26/2024  -     Lipid Panel; Future; Expected date: 07/26/2024  -     TSH; Future; Expected date: 07/26/2024  -     Hemoglobin A1C; Future; Expected date: 07/26/2024  -     T4, Free; Future; Expected date: 07/26/2024    S/P total knee arthroplasty, right    S/P total knee arthroplasty, left    Essential hypertension    Insomnia, unspecified type    Arthralgia, cervical spine  -     X-Ray Cervical Spine Complete 5 view; Future; Expected date: 07/26/2024    Thyroid nodule  -     US Thyroid;  Future; Expected date: 07/26/2024  -     TSH; Future; Expected date: 07/26/2024    Dysphagia, unspecified type  -     Ambulatory referral/consult to Gastroenterology; Future; Expected date: 08/02/2024    Gastroesophageal reflux disease, unspecified whether esophagitis present  -     Ambulatory referral/consult to Gastroenterology; Future; Expected date: 08/02/2024    Other orders  -     Zoster Recombinant Vaccine  -     pantoprazole (PROTONIX) 40 MG tablet; Take 1 tablet (40 mg total) by mouth once daily.  Dispense: 90 tablet; Refill: 1  -     zolpidem (AMBIEN) 5 MG Tab; Take 2 tablets (10 mg total) by mouth nightly as needed (insomnia).  Dispense: 30 tablet; Refill: 2    TD AP recommended.  Shingles shot recommended.  Ultrasound of thyroid.  CBC CMP lipids TSH A1c free T4.  Refill Ambien 10 mg HS 30 with 2 refills.    In addition to routine physical.  Having some cervical arthralgia.  Anterior neck pain thyroid ultrasound has shown nodule previously repeat ultrasound due.  Having some dysphagia especially for hard foods.  Little trouble since she took Ozempic some heartburn also.  Colonoscopy current.  Also asking for weight loss medicine.  BMI of 39.      Physical examination.  Vital signs noted.  Neck somewhat full.  Chest clear.  Heart regular rate rhythm.  Abdomen bowel sounds positive soft nontender.    Impression.  Dysphagia.  Gastroesophageal reflux disease.  Thyroid nodule.  Cervical arthralgia.      Plan x-ray cervical spine.  Protonix 40 mg daily 90 pills.  [facility] for EGD.  Mobic 15 mg daily p.r.n. for neck pain.  Discussed weight loss options after all labs are back follow-up to review.

## 2024-08-07 DIAGNOSIS — R79.89 ABNORMAL THYROID BLOOD TEST: Primary | ICD-10-CM

## 2024-08-08 ENCOUNTER — LAB VISIT (OUTPATIENT)
Dept: LAB | Facility: HOSPITAL | Age: 59
End: 2024-08-08
Attending: FAMILY MEDICINE
Payer: COMMERCIAL

## 2024-08-08 DIAGNOSIS — R79.89 ABNORMAL THYROID BLOOD TEST: ICD-10-CM

## 2024-08-08 LAB
T4 FREE SERPL-MCNC: 0.52 NG/DL (ref 0.71–1.51)
TSH SERPL DL<=0.005 MIU/L-ACNC: 0.85 UIU/ML (ref 0.34–5.6)

## 2024-08-08 PROCEDURE — 36415 COLL VENOUS BLD VENIPUNCTURE: CPT | Performed by: FAMILY MEDICINE

## 2024-08-08 PROCEDURE — 84439 ASSAY OF FREE THYROXINE: CPT | Performed by: FAMILY MEDICINE

## 2024-08-08 PROCEDURE — 86376 MICROSOMAL ANTIBODY EACH: CPT | Performed by: FAMILY MEDICINE

## 2024-08-08 PROCEDURE — 84146 ASSAY OF PROLACTIN: CPT | Performed by: FAMILY MEDICINE

## 2024-08-08 PROCEDURE — 86800 THYROGLOBULIN ANTIBODY: CPT | Performed by: FAMILY MEDICINE

## 2024-08-08 PROCEDURE — 84443 ASSAY THYROID STIM HORMONE: CPT | Performed by: FAMILY MEDICINE

## 2024-08-09 ENCOUNTER — PATIENT MESSAGE (OUTPATIENT)
Dept: FAMILY MEDICINE | Facility: CLINIC | Age: 59
End: 2024-08-09
Payer: COMMERCIAL

## 2024-08-10 LAB
PROLACTIN SERPL-MCNC: 9.6 NG/ML (ref 3.6–25.2)
THYROGLOB AB SERPL-ACNC: 121.2 IU/ML (ref 0–0.9)
THYROPEROXIDASE AB SERPL-ACNC: 25 IU/ML (ref 0–34)

## 2024-08-21 ENCOUNTER — PATIENT MESSAGE (OUTPATIENT)
Dept: FAMILY MEDICINE | Facility: CLINIC | Age: 59
End: 2024-08-21
Payer: COMMERCIAL

## 2024-09-02 ENCOUNTER — PATIENT MESSAGE (OUTPATIENT)
Dept: FAMILY MEDICINE | Facility: CLINIC | Age: 59
End: 2024-09-02
Payer: COMMERCIAL

## 2024-10-01 ENCOUNTER — OFFICE VISIT (OUTPATIENT)
Dept: FAMILY MEDICINE | Facility: CLINIC | Age: 59
End: 2024-10-01
Payer: COMMERCIAL

## 2024-10-01 VITALS
DIASTOLIC BLOOD PRESSURE: 64 MMHG | BODY MASS INDEX: 39.99 KG/M2 | OXYGEN SATURATION: 97 % | SYSTOLIC BLOOD PRESSURE: 110 MMHG | TEMPERATURE: 98 F | WEIGHT: 248.81 LBS | HEART RATE: 74 BPM | HEIGHT: 66 IN

## 2024-10-01 DIAGNOSIS — K21.9 GASTROESOPHAGEAL REFLUX DISEASE, UNSPECIFIED WHETHER ESOPHAGITIS PRESENT: ICD-10-CM

## 2024-10-01 DIAGNOSIS — Z15.89 COMPOUND HETEROZYGOUS MTHFR MUTATION C677T/A1298C: ICD-10-CM

## 2024-10-01 DIAGNOSIS — I26.99 OTHER PULMONARY EMBOLISM WITHOUT ACUTE COR PULMONALE, UNSPECIFIED CHRONICITY: ICD-10-CM

## 2024-10-01 DIAGNOSIS — E66.813 CLASS 3 OBESITY: ICD-10-CM

## 2024-10-01 DIAGNOSIS — I10 HYPERTENSION, ESSENTIAL: Primary | ICD-10-CM

## 2024-10-01 DIAGNOSIS — E04.1 THYROID NODULE: ICD-10-CM

## 2024-10-01 DIAGNOSIS — R73.03 PREDIABETES: ICD-10-CM

## 2024-10-01 PROCEDURE — 99213 OFFICE O/P EST LOW 20 MIN: CPT | Mod: S$GLB,,, | Performed by: FAMILY MEDICINE

## 2024-10-01 PROCEDURE — 3078F DIAST BP <80 MM HG: CPT | Mod: CPTII,S$GLB,, | Performed by: FAMILY MEDICINE

## 2024-10-01 PROCEDURE — 1159F MED LIST DOCD IN RCRD: CPT | Mod: CPTII,S$GLB,, | Performed by: FAMILY MEDICINE

## 2024-10-01 PROCEDURE — 3074F SYST BP LT 130 MM HG: CPT | Mod: CPTII,S$GLB,, | Performed by: FAMILY MEDICINE

## 2024-10-01 PROCEDURE — 3044F HG A1C LEVEL LT 7.0%: CPT | Mod: CPTII,S$GLB,, | Performed by: FAMILY MEDICINE

## 2024-10-01 PROCEDURE — 99999 PR PBB SHADOW E&M-EST. PATIENT-LVL III: CPT | Mod: PBBFAC,,, | Performed by: FAMILY MEDICINE

## 2024-10-01 PROCEDURE — 3008F BODY MASS INDEX DOCD: CPT | Mod: CPTII,S$GLB,, | Performed by: FAMILY MEDICINE

## 2024-10-01 PROCEDURE — 1160F RVW MEDS BY RX/DR IN RCRD: CPT | Mod: CPTII,S$GLB,, | Performed by: FAMILY MEDICINE

## 2024-10-01 RX ORDER — DEXTROAMPHETAMINE SACCHARATE, AMPHETAMINE ASPARTATE, DEXTROAMPHETAMINE SULFATE AND AMPHETAMINE SULFATE 7.5; 7.5; 7.5; 7.5 MG/1; MG/1; MG/1; MG/1
1 TABLET ORAL 2 TIMES DAILY
COMMUNITY
Start: 2024-08-23

## 2024-10-01 RX ORDER — PHENTERMINE HYDROCHLORIDE 37.5 MG/1
37.5 TABLET ORAL
Qty: 30 TABLET | Refills: 0 | Status: SHIPPED | OUTPATIENT
Start: 2024-10-01

## 2024-10-01 RX ORDER — DIAZEPAM 10 MG/1
10 TABLET ORAL 2 TIMES DAILY PRN
COMMUNITY
Start: 2024-09-19

## 2024-10-01 RX ORDER — PHENTERMINE HYDROCHLORIDE 37.5 MG/1
37.5 TABLET ORAL
COMMUNITY
End: 2024-10-01 | Stop reason: SDUPTHER

## 2024-10-01 NOTE — PROGRESS NOTES
SCRIBE #1 NOTE: I, Blake Chapman, am scribing for, and in the presence of, Manny Gamez III, MD. I have scribed the entire note.     Subjective:       Patient ID: Carola Blanco is a 58 y.o. female.    Chief Complaint: Follow-up    Carola Blanco is a 58 y.o. female who presents for follow-up. Ms. Blanco was last seen 7/26/24. She recently came back from a [place] cruise.  Ms. Blanco has been actively managing what she eats. She has no tension in her neck or heart burn issues. Pulmonary embolus. Hypertension is controlled. BP is 110/64. Cardiovascular good. No chest pain. No palpitations. Thyroid nodule. 12x9x9, 8x7x7, and 6x5x4. Each have increased in size.  TSH is low. Free T4 is low. Thyroid is currently non-tender. Antibodies against the thyroid looked okay. Elevated thyroid globulin antibodies, however. Patient has taken Adderall before and is off of it. Has taken Phentermine before. Cholesterol is 217. HDL is 63. LDL is 136. BMI is 40.16. Slight increase. Prediabetic. A1C is 5.9. Semaglutide injections have made her sick in the past. Blood sugar, liver, kidney, and blood count levels were normal. GERD. Doing good. MTHFR mutation. Hepatitis C screening due. HIV screening due. Pneumococcal, Tetanus, and Shingles vaccines due.  check done. All other care gaps discussed.           Review of Systems   Constitutional:  Negative for chills and fever.   HENT:  Negative for congestion and sore throat.    Eyes:  Negative for visual disturbance.   Respiratory:  Negative for chest tightness and shortness of breath.    Cardiovascular:  Negative for chest pain and palpitations.   Gastrointestinal:  Negative for nausea.   Endocrine: Negative for polydipsia and polyuria.   Genitourinary:  Negative for dysuria and flank pain.   Musculoskeletal:  Negative for back pain, neck pain and neck stiffness.   Skin:  Negative for rash.   Neurological:  Negative for weakness.   Hematological:  Does not bruise/bleed easily.    Psychiatric/Behavioral:  Negative for behavioral problems.    All other systems reviewed and are negative.      Objective:      Physical examination: Vital signs noted. No acute distress. No carotid bruit. Regular heart rate and rhythm. Lungs clear to auscultation bilaterally. Abdomen bowel sounds are positive soft and nontender. Extremities without edema. 2+ pedal pulses.      Assessment:       1. Hypertension, essential    2. Other pulmonary embolism without acute cor pulmonale, unspecified chronicity    3. Compound heterozygous MTHFR mutation [id number]    4. BMI 40.0-44.9, adult    5. Gastroesophageal reflux disease, unspecified whether esophagitis present    6. Prediabetes    7. Thyroid nodule        Plan:       Hypertension, essential    Other pulmonary embolism without acute cor pulmonale, unspecified chronicity    Compound heterozygous MTHFR mutation [id number]    BMI 40.0-44.9, adult    Gastroesophageal reflux disease, unspecified whether esophagitis present    Prediabetes    Thyroid nodule  -     TSH; Future; Expected date: 10/01/2024  -     T4, Free; Future; Expected date: 10/01/2024      Hypertension is under good control today.  G LP 1 cause nausea before.  Prediabetic now.  Thyroid function studies are borderline.  Repeat free T4 TSH.  No Adderall.  One-month follow-up.  But on phentermine 37.5 mg 30 pills.  I, Dr Manny Gamez, personally performed the services described in this documentation. All medical record entries made by the scribe were at my direction and in my presence. I have reviewed the chart and agree that the record reflects my personal performance and is accurate and complete.

## 2024-10-02 PROBLEM — R73.03 PREDIABETES: Status: ACTIVE | Noted: 2024-10-02

## 2024-10-02 PROBLEM — E66.813 CLASS 3 OBESITY: Status: ACTIVE | Noted: 2024-10-02

## 2024-10-22 ENCOUNTER — OFFICE VISIT (OUTPATIENT)
Dept: GASTROENTEROLOGY | Facility: CLINIC | Age: 59
End: 2024-10-22
Payer: COMMERCIAL

## 2024-10-22 VITALS — HEIGHT: 66 IN | WEIGHT: 241.88 LBS | BODY MASS INDEX: 38.87 KG/M2

## 2024-10-22 DIAGNOSIS — K21.9 GASTROESOPHAGEAL REFLUX DISEASE, UNSPECIFIED WHETHER ESOPHAGITIS PRESENT: ICD-10-CM

## 2024-10-22 DIAGNOSIS — R13.10 DYSPHAGIA, UNSPECIFIED TYPE: ICD-10-CM

## 2024-10-22 PROCEDURE — 1160F RVW MEDS BY RX/DR IN RCRD: CPT | Mod: CPTII,S$GLB,, | Performed by: INTERNAL MEDICINE

## 2024-10-22 PROCEDURE — 99214 OFFICE O/P EST MOD 30 MIN: CPT | Mod: S$GLB,,, | Performed by: INTERNAL MEDICINE

## 2024-10-22 PROCEDURE — 3044F HG A1C LEVEL LT 7.0%: CPT | Mod: CPTII,S$GLB,, | Performed by: INTERNAL MEDICINE

## 2024-10-22 PROCEDURE — 3008F BODY MASS INDEX DOCD: CPT | Mod: CPTII,S$GLB,, | Performed by: INTERNAL MEDICINE

## 2024-10-22 PROCEDURE — 99999 PR PBB SHADOW E&M-EST. PATIENT-LVL III: CPT | Mod: PBBFAC,,, | Performed by: INTERNAL MEDICINE

## 2024-10-22 PROCEDURE — 1159F MED LIST DOCD IN RCRD: CPT | Mod: CPTII,S$GLB,, | Performed by: INTERNAL MEDICINE

## 2024-10-22 NOTE — PROGRESS NOTES
[facility]     CC: Dysphagia    HPI 58 y.o. female with history of thyroid disorder, goiter and obesity presents for evaluation of several months of intermittent, moderate dysphagia for solids associated with nausea. Her symptoms began after starting Mounjaro earlier this year and have improved since she stopped this medication. She feels the need to avoid certain foods and eat slowly to avoid symptoms of dysphagia. She has a history of reflux that has also improved since stopping Mounjaro and now takes a PPI 1-2 times a week after consuming trigger foods. She has no prior EGD and her last colonoscopy was in 2023 and was without polyps, 10 year follow up recommended.       Past Medical History:   Diagnosis Date    Arthritis     Compound heterozygous MTHFR mutation C677T/[id number] 12/12/2018    Homocysteinemia 12/12/2018    Hypertension     Nodule of left lung 10/17/2018    Normochromic normocytic anemia 10/17/2018       Past Surgical History:   Procedure Laterality Date    COLONOSCOPY N/A 5/16/2023    Procedure: COLONOSCOPY;  Surgeon: Timothy Lima MD;  Location: [facility] ENDO;  Service: Endoscopy;  Laterality: N/A;    JOINT REPLACEMENT      KNEE ARTHROPLASTY Right 10/04/2018    Procedure: ARTHROPLASTY, KNEE;  Surgeon: Markie Bang MD;  Location: [facility] OR;  Service: Orthopedics;  Laterality: Right;    KNEE SURGERY Left 04/19/2018       Social History     Tobacco Use    Smoking status: Never    Smokeless tobacco: Never   Substance Use Topics    Alcohol use: Never    Drug use: No       Family History   Problem Relation Name Age of Onset    Arthritis Mother shauna beecrra     Birth defects Mother shauna becerra     Breast cancer Mother shauna becerra     Lung cancer Father          lung    Arthritis Maternal Grandmother shannon        Allergies and Medications reviewed     Review of Systems  General ROS: negative for - chills, fever or weight loss  Psychological ROS: negative for - hallucination, depression or  "suicidal ideation  Ophthalmic ROS: negative for - blurry vision, photophobia or eye pain  ENT ROS: negative for - epistaxis, sore throat or rhinorrhea  Respiratory ROS: no cough, shortness of breath, or wheezing  Cardiovascular ROS: no chest pain or dyspnea on exertion  Gastrointestinal ROS: improved dysphagia, occasional GERD, no blood in stool  Genito-Urinary ROS: no dysuria, trouble voiding, or hematuria  Musculoskeletal ROS: negative for - arthralgia, myalgia, weakness  Neurological ROS: no syncope or seizures; no ataxia  Dermatological ROS: negative for pruritis, rash and jaundice    Physical Examination  Ht 5' 6" (1.676 m)   Wt 109.7 kg (241 lb 13.5 oz)   BMI 39.03 kg/m²   General appearance: alert, cooperative, no distress  HENT: Normocephalic, atraumatic, neck symmetrical, no nasal discharge , enlarged thyroid  Eyes: conjunctivae/corneas clear, PERRL, EOM's intact, sclera anicteric  Lungs: clear to auscultation bilaterally, no dullness to percussion bilaterally, symmetric expansion, breathing unlabored  Heart: regular rate and rhythm without rub; no displacement of the PMI   Abdomen: soft, nontender,nondistended, BS active ,no masses appreciated  Extremities: extremities symmetric; no clubbing, cyanosis, or edema  Integument: Skin color, texture, turgor normal; no rashes; hair distrubution normal, no jaundice  Neurologic: Alert and oriented X 3, no focal sensory or motor neurologic deficits  Psychiatric: no pressured speech; normal affect; no evidence of impaired cognition, no anxiety/depression     Labs:  Lab Results   Component Value Date    WBC 8.93 07/28/2024    HGB 14.1 07/28/2024    HCT 43.4 07/28/2024    MCV 88 07/28/2024     07/28/2024       CMP  Sodium   Date Value Ref Range Status   07/28/2024 140 136 - 145 mmol/L Final     Potassium   Date Value Ref Range Status   07/28/2024 4.3 3.5 - 5.1 mmol/L Final     Chloride   Date Value Ref Range Status   07/28/2024 105 95 - 110 mmol/L Final "     CO2   Date Value Ref Range Status   07/28/2024 26 23 - 29 mmol/L Final     Glucose   Date Value Ref Range Status   07/28/2024 93 70 - 110 mg/dL Final     BUN   Date Value Ref Range Status   07/28/2024 19 6 - 20 mg/dL Final     Creatinine   Date Value Ref Range Status   07/28/2024 1.0 0.5 - 1.4 mg/dL Final     Calcium   Date Value Ref Range Status   07/28/2024 9.1 8.7 - 10.5 mg/dL Final     Total Protein   Date Value Ref Range Status   07/28/2024 6.9 6.0 - 8.4 g/dL Final     Albumin   Date Value Ref Range Status   07/28/2024 4.0 3.5 - 5.2 g/dL Final     Total Bilirubin   Date Value Ref Range Status   07/28/2024 0.6 0.1 - 1.0 mg/dL Final     Comment:     For infants and newborns, interpretation of results should be based  on gestational age, weight and in agreement with clinical  observations.    Premature Infant recommended reference ranges:  Up to 24 hours.............<8.0 mg/dL  Up to 48 hours............<12.0 mg/dL  3-5 days..................<15.0 mg/dL  6-29 days.................<15.0 mg/dL       Alkaline Phosphatase   Date Value Ref Range Status   07/28/2024 28 (L) 55 - 135 U/L Final     AST   Date Value Ref Range Status   07/28/2024 15 10 - 40 U/L Final     ALT   Date Value Ref Range Status   07/28/2024 12 10 - 44 U/L Final     Anion Gap   Date Value Ref Range Status   07/28/2024 9 8 - 16 mmol/L Final     eGFR   Date Value Ref Range Status   07/28/2024 >60.0 >60 mL/min/1.73 m^2 Final         Assessment:   58 y.o. female with history of thyroid disroder/goiter, presents for evaluation of solid food dysphagia that has improved after stopping Mounjaro.   Plan:  -Schedule EGD, likely with dilation/biopsies for EoE  -Continue PPI PRN  -Counseled to take small bites, chew thoroughly, plenty of liquids with eating, do not rush when eating  -Repeat colonoscopy 2033    Mendy Floyd MD  [facility]  [address], [address]  [place] [address]  Office: [phone]  Fax: [phone]

## 2024-10-24 ENCOUNTER — LAB VISIT (OUTPATIENT)
Dept: LAB | Facility: HOSPITAL | Age: 59
End: 2024-10-24
Attending: FAMILY MEDICINE
Payer: COMMERCIAL

## 2024-10-24 DIAGNOSIS — E04.1 THYROID NODULE: ICD-10-CM

## 2024-10-24 LAB
T4 FREE SERPL-MCNC: 0.51 NG/DL (ref 0.71–1.51)
TSH SERPL DL<=0.005 MIU/L-ACNC: 1.06 UIU/ML (ref 0.34–5.6)

## 2024-10-24 PROCEDURE — 36415 COLL VENOUS BLD VENIPUNCTURE: CPT | Performed by: FAMILY MEDICINE

## 2024-10-24 PROCEDURE — 84439 ASSAY OF FREE THYROXINE: CPT | Performed by: FAMILY MEDICINE

## 2024-10-24 PROCEDURE — 84443 ASSAY THYROID STIM HORMONE: CPT | Performed by: FAMILY MEDICINE

## 2024-11-01 ENCOUNTER — OFFICE VISIT (OUTPATIENT)
Dept: FAMILY MEDICINE | Facility: CLINIC | Age: 59
End: 2024-11-01
Payer: COMMERCIAL

## 2024-11-01 VITALS
DIASTOLIC BLOOD PRESSURE: 78 MMHG | WEIGHT: 241.75 LBS | HEART RATE: 81 BPM | BODY MASS INDEX: 38.85 KG/M2 | SYSTOLIC BLOOD PRESSURE: 126 MMHG | TEMPERATURE: 98 F | HEIGHT: 66 IN | OXYGEN SATURATION: 96 %

## 2024-11-01 DIAGNOSIS — I10 HYPERTENSION, ESSENTIAL: Primary | ICD-10-CM

## 2024-11-01 DIAGNOSIS — Z23 NEED FOR VACCINATION: ICD-10-CM

## 2024-11-01 DIAGNOSIS — K21.9 GASTROESOPHAGEAL REFLUX DISEASE, UNSPECIFIED WHETHER ESOPHAGITIS PRESENT: ICD-10-CM

## 2024-11-01 PROCEDURE — 90750 HZV VACC RECOMBINANT IM: CPT | Mod: S$GLB,,, | Performed by: FAMILY MEDICINE

## 2024-11-01 PROCEDURE — 3078F DIAST BP <80 MM HG: CPT | Mod: CPTII,S$GLB,, | Performed by: FAMILY MEDICINE

## 2024-11-01 PROCEDURE — 3008F BODY MASS INDEX DOCD: CPT | Mod: CPTII,S$GLB,, | Performed by: FAMILY MEDICINE

## 2024-11-01 PROCEDURE — 1159F MED LIST DOCD IN RCRD: CPT | Mod: CPTII,S$GLB,, | Performed by: FAMILY MEDICINE

## 2024-11-01 PROCEDURE — 1160F RVW MEDS BY RX/DR IN RCRD: CPT | Mod: CPTII,S$GLB,, | Performed by: FAMILY MEDICINE

## 2024-11-01 PROCEDURE — 3044F HG A1C LEVEL LT 7.0%: CPT | Mod: CPTII,S$GLB,, | Performed by: FAMILY MEDICINE

## 2024-11-01 PROCEDURE — 90471 IMMUNIZATION ADMIN: CPT | Mod: S$GLB,,, | Performed by: FAMILY MEDICINE

## 2024-11-01 PROCEDURE — 99999 PR PBB SHADOW E&M-EST. PATIENT-LVL III: CPT | Mod: PBBFAC,,, | Performed by: FAMILY MEDICINE

## 2024-11-01 PROCEDURE — 99213 OFFICE O/P EST LOW 20 MIN: CPT | Mod: 25,S$GLB,, | Performed by: FAMILY MEDICINE

## 2024-11-01 PROCEDURE — 3074F SYST BP LT 130 MM HG: CPT | Mod: CPTII,S$GLB,, | Performed by: FAMILY MEDICINE

## 2024-11-01 RX ORDER — PHENTERMINE HYDROCHLORIDE 37.5 MG/1
37.5 TABLET ORAL
Qty: 30 TABLET | Refills: 0 | Status: SHIPPED | OUTPATIENT
Start: 2024-11-01

## 2024-11-01 NOTE — PROGRESS NOTES
SCRIBE #1 NOTE: I, Blake Chapman, am scribing for, and in the presence of, Manny Gamez III, MD. I have scribed the entire note.     Subjective:       Patient ID: Carola Blanco is a 58 y.o. female.    Chief Complaint: Follow-up (1 month)    Carola Blanco is a 58 y.o. female who presents for 1 month follow-up. Ms. Blanco was last seen 10/1/24. Ms. Blanco is planning to go on a cruise this month. Hypertension is controlled. BP is 126/78. Cardiovascular good. No chest pain. No palpitations. BMI is 39.02. Lost 7 pounds on Phentermine. Free T4 is .51. TSH is normal. GERD. Pneumococcal, HIV, and Tetanus vaccines due. Will administer the Influenza vaccine today. Esophageal scope scheduled for December 3rd, sees Dr. Nugent. All other care gaps discussed.           Review of Systems    Objective:      Physical examination: Vital signs noted. No acute distress. No carotid bruit. Regular heart rate and rhythm. Lungs clear to auscultation bilaterally. Abdomen bowel sounds are positive soft and nontender. Extremities without edema. 2+ pedal pulses.      Assessment:       1. Hypertension, essential    2. BMI 39.0-39.9,adult    3. Gastroesophageal reflux disease, unspecified whether esophagitis present    4. Need for vaccination        Plan:       Hypertension, essential    BMI 39.0-39.9,adult  -     phentermine (ADIPEX-P) 37.5 mg tablet; Take 1 tablet (37.5 mg total) by mouth before breakfast.  Dispense: 30 tablet; Refill: 0    Gastroesophageal reflux disease, unspecified whether esophagitis present    Need for vaccination  -     Cancel: Zoster Recombinant Vaccine    Other orders  -     Zoster Recombinant Vaccine      Phentermine 37.5 mg 30 pills.  Continue low-fat low sugar diet.  Declines flu shot.  Recommend 2nd shingles vaccine.  Follow-up in one-month.  I, Dr Manny Gamez, personally performed the services described in this documentation. All medical record entries made by the scribe were at my direction and in my presence.  I have reviewed the chart and agree that the record reflects my personal performance and is accurate and complete.

## 2024-11-08 ENCOUNTER — TELEPHONE (OUTPATIENT)
Dept: GASTROENTEROLOGY | Facility: CLINIC | Age: 59
End: 2024-11-08
Payer: COMMERCIAL

## 2024-11-08 NOTE — TELEPHONE ENCOUNTER
----- Message from Jessica sent at 11/8/2024 10:01 AM CST -----  Contact: Patient  Type:  Appointment Request    Caller is requesting a sooner appointment.  Caller declined first available appointment listed below.  Caller will not accept being placed on the waitlist and is requesting a message be sent to doctor.    Name of Caller:  Patient  When is the first available appointment?  N/A    Would the patient rather a call back or a response via [facility]?   Call back  Best Call Back Number:  [phone]    Additional Information: States she would like to speak with someone about rescheduling her 12/3 procedure - please call - thank  you

## 2024-11-08 NOTE — TELEPHONE ENCOUNTER
Call placed to Ms. Blanco in regards to message received. Procedure rescheduled to 12/10. She accepted. No further issues noted.

## 2024-12-10 ENCOUNTER — ANESTHESIA (OUTPATIENT)
Dept: ENDOSCOPY | Facility: HOSPITAL | Age: 59
End: 2024-12-10
Payer: COMMERCIAL

## 2024-12-10 ENCOUNTER — ANESTHESIA EVENT (OUTPATIENT)
Dept: ENDOSCOPY | Facility: HOSPITAL | Age: 59
End: 2024-12-10
Payer: COMMERCIAL

## 2024-12-10 ENCOUNTER — HOSPITAL ENCOUNTER (OUTPATIENT)
Facility: HOSPITAL | Age: 59
Discharge: HOME | End: 2024-12-10
Attending: INTERNAL MEDICINE | Admitting: INTERNAL MEDICINE
Payer: COMMERCIAL

## 2024-12-10 DIAGNOSIS — R13.10 DYSPHAGIA: ICD-10-CM

## 2024-12-10 PROCEDURE — 43239 EGD BIOPSY SINGLE/MULTIPLE: CPT | Mod: 59 | Performed by: INTERNAL MEDICINE

## 2024-12-10 PROCEDURE — C1726 CATH, BAL DIL, NON-VASCULAR: HCPCS | Performed by: INTERNAL MEDICINE

## 2024-12-10 PROCEDURE — 27201012 HC FORCEPS, HOT/COLD, DISP: Performed by: INTERNAL MEDICINE

## 2024-12-10 PROCEDURE — 43249 ESOPH EGD DILATION <30 MM: CPT | Performed by: INTERNAL MEDICINE

## 2024-12-10 PROCEDURE — 37000008 HC ANESTHESIA 1ST 15 MINUTES: Performed by: INTERNAL MEDICINE

## 2024-12-10 PROCEDURE — 43239 EGD BIOPSY SINGLE/MULTIPLE: CPT | Mod: 59,,, | Performed by: INTERNAL MEDICINE

## 2024-12-10 PROCEDURE — 43249 ESOPH EGD DILATION <30 MM: CPT | Mod: ,,, | Performed by: INTERNAL MEDICINE

## 2024-12-10 PROCEDURE — 25000003 PHARM REV CODE 250: Performed by: INTERNAL MEDICINE

## 2024-12-10 PROCEDURE — 37000009 HC ANESTHESIA EA ADD 15 MINS: Performed by: INTERNAL MEDICINE

## 2024-12-10 PROCEDURE — 63600175 PHARM REV CODE 636 W HCPCS: Performed by: NURSE ANESTHETIST, CERTIFIED REGISTERED

## 2024-12-10 RX ORDER — LIDOCAINE HYDROCHLORIDE 20 MG/ML
INJECTION, SOLUTION EPIDURAL; INFILTRATION; INTRACAUDAL; PERINEURAL
Status: DISCONTINUED
Start: 2024-12-10 | End: 2024-12-10 | Stop reason: HOSPADM

## 2024-12-10 RX ORDER — LIDOCAINE HYDROCHLORIDE 20 MG/ML
INJECTION INTRAVENOUS
Status: DISCONTINUED | OUTPATIENT
Start: 2024-12-10 | End: 2024-12-10

## 2024-12-10 RX ORDER — PROPOFOL 10 MG/ML
INJECTION, EMULSION INTRAVENOUS
Status: COMPLETED
Start: 2024-12-10 | End: 2024-12-10

## 2024-12-10 RX ORDER — PROPOFOL 10 MG/ML
VIAL (ML) INTRAVENOUS
Status: DISCONTINUED | OUTPATIENT
Start: 2024-12-10 | End: 2024-12-10

## 2024-12-10 RX ORDER — SODIUM CHLORIDE 9 MG/ML
INJECTION, SOLUTION INTRAVENOUS CONTINUOUS
Status: DISCONTINUED | OUTPATIENT
Start: 2024-12-10 | End: 2024-12-10 | Stop reason: HOSPADM

## 2024-12-10 RX ADMIN — LIDOCAINE HYDROCHLORIDE 100 MG: 20 INJECTION, SOLUTION INTRAVENOUS at 01:12

## 2024-12-10 RX ADMIN — PROPOFOL 100 MG: 10 INJECTION, EMULSION INTRAVENOUS at 01:12

## 2024-12-10 RX ADMIN — PROPOFOL 50 MG: 10 INJECTION, EMULSION INTRAVENOUS at 01:12

## 2024-12-10 RX ADMIN — SODIUM CHLORIDE: 9 INJECTION, SOLUTION INTRAVENOUS at 01:12

## 2024-12-10 NOTE — TRANSFER OF CARE
"Anesthesia Transfer of Care Note    Patient: Carola Blanco    Procedure(s) Performed: Procedure(s) (LRB):  EGD (ESOPHAGOGASTRODUODENOSCOPY) (N/A)    Patient location: GI    Anesthesia Type: general    Transport from OR: Transported from OR on room air with adequate spontaneous ventilation    Post pain: adequate analgesia    Post assessment: no apparent anesthetic complications    Post vital signs: stable    Level of consciousness: sedated    Nausea/Vomiting: no nausea/vomiting    Complications: none    Transfer of care protocol was followed      Last vitals: Visit Vitals  /89 (BP Location: Left arm, Patient Position: Lying)   Pulse 91   Temp 36.7 °C (98.1 °F) (Skin)   Resp 18   Ht 5' 5" (1.651 m)   Wt 108 kg (238 lb)   SpO2 97%   Breastfeeding No   BMI 39.61 kg/m²     "

## 2024-12-10 NOTE — ANESTHESIA PREPROCEDURE EVALUATION
12/10/2024  Carola Blanco is a 59 y.o., female.      Pre-op Assessment    I have reviewed the Patient Summary Reports.     I have reviewed the Nursing Notes. I have reviewed the NPO Status.   I have reviewed the Medications.     Review of Systems  Anesthesia Hx:  No problems with previous Anesthesia                Cardiovascular:     Hypertension               Hx PE  No comp currently                     Hypertension         Pulmonary:  Pulmonary Normal                       Hepatic/GI:     GERD         Gerd          Musculoskeletal:  Arthritis        Arthritis          Neurological:  Neurology Normal              Arthritis                           Endocrine:        Obesity / BMI > 30      Physical Exam  General: Well nourished    Airway:  Mallampati: II   Mouth Opening: Normal  TM Distance: Normal  Neck ROM: Normal ROM        Anesthesia Plan  Type of Anesthesia, risks & benefits discussed:    Anesthesia Type: Gen Natural Airway  Intra-op Monitoring Plan: Standard ASA Monitors  Induction:  IV  Informed Consent: Informed consent signed with the Patient and all parties understand the risks and agree with anesthesia plan.  All questions answered.   ASA Score: 3    Ready For Surgery From Anesthesia Perspective.     .

## 2024-12-10 NOTE — H&P
[facility]    CC: Dysphagia    HPI 59 y.o. female presents for EGD    Past Medical History:   Diagnosis Date    Arthritis     Compound heterozygous MTHFR mutation [id number] 12/12/2018    Homocysteinemia 12/12/2018    Hypertension     Nodule of left lung 10/17/2018    Normochromic normocytic anemia 10/17/2018       Allergies and Medications reviewed     Review of Systems  General ROS: negative for - chills, fever or weight loss  Cardiovascular ROS: no chest pain or dyspnea on exertion  Gastrointestinal ROS: + dysphagia    Physical Examination  There were no vitals taken for this visit.  General appearance: alert, cooperative, no distress  HENT: Normocephalic, atraumatic, neck symmetrical, no nasal discharge, sclera anicteric   Lungs: clear to auscultation bilaterally, symmetric chest wall expansion bilaterally  Heart: regular rate and rhythm without rub; no displacement of the PMI   Abdomen: soft  Extremities: extremities symmetric; no clubbing, cyanosis, or edema        Labs:  Lab Results   Component Value Date    WBC 8.93 07/28/2024    HGB 14.1 07/28/2024    HCT 43.4 07/28/2024    MCV 88 07/28/2024     07/28/2024         Assessment:   59 y.o. female presents for eGD    Plan:  -Proceed to EGD    Mendy Floyd MD  [facility]  [address], [address]  [place] [address]  Office: [phone]  Fax: [phone]

## 2024-12-10 NOTE — PROVATION PATIENT INSTRUCTIONS
Discharge Summary/Instructions after an Endoscopic Procedure  Patient Name: Carola Blanco  Patient MRN: [id number]  Patient YOB: 1965  Tuesday, December 10, 2024  Mendy Floyd MD  Dear patient,  As a result of recent federal legislation (The Federal Cures Act), you may   receive lab or pathology results from your procedure in your [facility]   account before your physician is able to contact you. Your physician or   their representative will relay the results to you with their   recommendations at their soonest availability.  Thank you,  RESTRICTIONS:  During your procedure today, you received medications for sedation.  These   medications may affect your judgment, balance and coordination.  Therefore,   for 24 hours, you have the following restrictions:   - DO NOT drive a car, operate machinery, make legal/financial decisions,   sign important papers or drink alcohol.    ACTIVITY:  Today: no heavy lifting, straining or running due to procedural   sedation/anesthesia.  The following day: return to full activity including work.  DIET:  Eat and drink normally unless instructed otherwise.     TREATMENT FOR COMMON SIDE EFFECTS:  - Mild abdominal pain, nausea, belching, bloating or excessive gas:  rest,   eat lightly and use a heating pad.  - Sore Throat: treat with throat lozenges and/or gargle with warm salt   water.  - Because air was used during the procedure, expelling large amounts of air   from your rectum or belching is normal.  - If a bowel prep was taken, you may not have a bowel movement for 1-3 days.    This is normal.  SYMPTOMS TO WATCH FOR AND REPORT TO YOUR PHYSICIAN:  1. Abdominal pain or bloating, other than gas cramps.  2. Chest pain.  3. Back pain.  4. Signs of infection such as: chills or fever occurring within 24 hours   after the procedure.  5. Rectal bleeding, which would show as bright red, maroon, or black stools.   (A tablespoon of blood from the rectum is not serious,  especially if   hemorrhoids are present.)  6. Vomiting.  7. Weakness or dizziness.  GO DIRECTLY TO THE NEAREST EMERGENCY ROOM IF YOU HAVE ANY OF THE FOLLOWING:      Difficulty breathing              Chills and/or fever over 101 F   Persistent vomiting and/or vomiting blood   Severe abdominal pain   Severe chest pain   Black, tarry stools   Bleeding- more than one tablespoon   Any other symptom or condition that you feel may need urgent attention  Your doctor recommends these additional instructions:  If any biopsies were taken, your doctors clinic will contact you in 1 to 2   weeks with any results.  - Await pathology results.   - Discharge patient to home (with escort).   - Patient has a contact number available for emergencies.  The signs and   symptoms of potential delayed complications were discussed with the   patient.  Return to normal activities tomorrow.  Written discharge   instructions were provided to the patient.   - Resume previous diet.   - Continue present medications.   -Consider esophagram if symptoms do not improve after dilation  For questions, problems or results please call your physician - Mendy Floyd MD at Work:  [phone].  [facility], EMERGENCY ROOM PHONE NUMBER: [phone]  IF A COMPLICATION OR EMERGENCY SITUATION ARISES AND YOU ARE UNABLE TO REACH   YOUR PHYSICIAN - GO DIRECTLY TO THE EMERGENCY ROOM.  Mendy Floyd MD  12/10/2024 2:09:14 PM  This report has been verified and signed electronically.  Dear patient,  As a result of recent federal legislation (The Federal Cures Act), you may   receive lab or pathology results from your procedure in your [facility]   account before your physician is able to contact you. Your physician or   their representative will relay the results to you with their   recommendations at their soonest availability.  Thank you,  PROVATION

## 2024-12-11 VITALS
HEART RATE: 72 BPM | DIASTOLIC BLOOD PRESSURE: 86 MMHG | RESPIRATION RATE: 18 BRPM | SYSTOLIC BLOOD PRESSURE: 146 MMHG | OXYGEN SATURATION: 100 % | TEMPERATURE: 98 F | WEIGHT: 238 LBS | HEIGHT: 65 IN | BODY MASS INDEX: 39.65 KG/M2

## 2024-12-11 NOTE — ANESTHESIA POSTPROCEDURE EVALUATION
Anesthesia Post Evaluation    Patient: Carola Blanco    Procedure(s) Performed: Procedure(s) (LRB):  EGD (ESOPHAGOGASTRODUODENOSCOPY) (N/A)    Final Anesthesia Type: general      Patient location during evaluation: PACU  Patient participation: Yes- Able to Participate  Level of consciousness: awake  Post-procedure vital signs: reviewed and stable  Pain management: adequate  Airway patency: patent    PONV status at discharge: No PONV  Anesthetic complications: no      Cardiovascular status: blood pressure returned to baseline  Respiratory status: unassisted  Hydration status: euvolemic  Follow-up not needed.              Vitals Value Taken Time   /86 12/10/24 1423   Temp 36.6 °C (97.9 °F) 12/10/24 1423   Pulse 72 12/10/24 1423   Resp 18 12/10/24 1423   SpO2 100 % 12/10/24 1423         Event Time   Out of Recovery 14:35:01         Pain/Xander Score: Xander Score: 10 (12/10/2024  2:23 PM)

## 2024-12-16 NOTE — TELEPHONE ENCOUNTER
----- Message from Christa Kaiser Foundation HospitalTUSHAR:[phone]   ----- Message from Prashanth Cabrera NP sent at 2/8/2018  4:38 PM CST -----  Neg carotid u/s

## 2025-01-24 ENCOUNTER — PATIENT MESSAGE (OUTPATIENT)
Dept: ADMINISTRATIVE | Facility: OTHER | Age: 60
End: 2025-01-24
Payer: COMMERCIAL

## 2025-02-12 ENCOUNTER — TELEPHONE (OUTPATIENT)
Dept: FAMILY MEDICINE | Facility: CLINIC | Age: 60
End: 2025-02-12
Payer: COMMERCIAL

## 2025-03-28 RX ORDER — PANTOPRAZOLE SODIUM 40 MG/1
40 TABLET, DELAYED RELEASE ORAL
Qty: 90 TABLET | Refills: 2 | Status: SHIPPED | OUTPATIENT
Start: 2025-03-28

## 2025-03-28 NOTE — TELEPHONE ENCOUNTER
Refill Decision Note   Carola Francis  is requesting a refill authorization.  Brief Assessment and Rationale for Refill:  Approve     Medication Therapy Plan:        Comments:     Note composed:8:36 AM 03/28/2025

## 2025-03-28 NOTE — TELEPHONE ENCOUNTER
No care due was identified.  [facility] Embedded Care Due Messages. Reference number: [id number].   3/28/2025 8:01:12 AM CDT

## 2025-04-16 DIAGNOSIS — Z12.31 OTHER SCREENING MAMMOGRAM: ICD-10-CM

## 2025-04-21 ENCOUNTER — PATIENT OUTREACH (OUTPATIENT)
Dept: ADMINISTRATIVE | Facility: HOSPITAL | Age: 60
End: 2025-04-21
Payer: COMMERCIAL

## 2025-04-21 ENCOUNTER — PATIENT MESSAGE (OUTPATIENT)
Dept: ADMINISTRATIVE | Facility: HOSPITAL | Age: 60
End: 2025-04-21
Payer: COMMERCIAL

## 2025-04-21 NOTE — PROGRESS NOTES
Population Health Chart Review & Patient Outreach Details    Outreach Performed: YES Portal Active and/or Letter inactive    Additional [facility] Notes:    BREAST CANCER SCREENING    Non-compliant report chart audits for BREAST CANCER SCREENING     Outreach to patient in reference to SCHEDULING A MAMMOGRAM EXAM.     .ORDERSPLACED       Updates Requested / Reviewed:               Health Maintenance Topics Overdue:      VBHM Score: 1     Mammogram

## 2025-08-11 DIAGNOSIS — R73.03 PREDIABETES: ICD-10-CM

## 2025-08-18 ENCOUNTER — LAB VISIT (OUTPATIENT)
Dept: LAB | Facility: HOSPITAL | Age: 60
End: 2025-08-18
Attending: NURSE PRACTITIONER
Payer: COMMERCIAL

## 2025-08-18 ENCOUNTER — OFFICE VISIT (OUTPATIENT)
Dept: FAMILY MEDICINE | Facility: CLINIC | Age: 60
End: 2025-08-18
Payer: COMMERCIAL

## 2025-08-18 VITALS
DIASTOLIC BLOOD PRESSURE: 72 MMHG | SYSTOLIC BLOOD PRESSURE: 104 MMHG | BODY MASS INDEX: 39.07 KG/M2 | HEIGHT: 65 IN | HEART RATE: 78 BPM | TEMPERATURE: 98 F | WEIGHT: 234.5 LBS | OXYGEN SATURATION: 98 %

## 2025-08-18 DIAGNOSIS — M79.7 FIBROMYALGIA: Primary | ICD-10-CM

## 2025-08-18 DIAGNOSIS — R73.03 PREDIABETES: ICD-10-CM

## 2025-08-18 DIAGNOSIS — M17.12 PRIMARY OSTEOARTHRITIS OF LEFT KNEE: ICD-10-CM

## 2025-08-18 DIAGNOSIS — Z13.220 SCREENING FOR HYPERLIPIDEMIA: ICD-10-CM

## 2025-08-18 DIAGNOSIS — I10 ESSENTIAL HYPERTENSION: ICD-10-CM

## 2025-08-18 DIAGNOSIS — E66.813 CLASS 3 OBESITY: ICD-10-CM

## 2025-08-18 DIAGNOSIS — G47.00 INSOMNIA, UNSPECIFIED TYPE: ICD-10-CM

## 2025-08-18 DIAGNOSIS — E04.1 THYROID NODULE: ICD-10-CM

## 2025-08-18 DIAGNOSIS — M13.0 POLYARTHRITIS: ICD-10-CM

## 2025-08-18 LAB
ABSOLUTE EOSINOPHIL (SMH): 0.08 K/UL
ABSOLUTE MONOCYTE (SMH): 0.49 K/UL (ref 0.3–1)
ABSOLUTE NEUTROPHIL COUNT (SMH): 3.7 K/UL (ref 1.8–7.7)
ALBUMIN SERPL-MCNC: 4.4 G/DL (ref 3.5–5.2)
ALP SERPL-CCNC: 31 UNIT/L (ref 55–135)
ALT SERPL-CCNC: 12 UNIT/L (ref 10–44)
ANION GAP (SMH): 9 MMOL/L (ref 8–16)
AST SERPL-CCNC: 15 UNIT/L (ref 10–40)
BASOPHILS # BLD AUTO: 0.05 K/UL
BASOPHILS NFR BLD AUTO: 0.8 %
BILIRUB SERPL-MCNC: 0.6 MG/DL (ref 0.1–1)
BUN SERPL-MCNC: 12 MG/DL (ref 6–20)
CALCIUM SERPL-MCNC: 10.1 MG/DL (ref 8.7–10.5)
CHLORIDE SERPL-SCNC: 103 MMOL/L (ref 95–110)
CHOLEST SERPL-MCNC: 230 MG/DL (ref 120–199)
CHOLEST/HDLC SERPL: 4.2 {RATIO} (ref 2–5)
CO2 SERPL-SCNC: 29 MMOL/L (ref 23–29)
CREAT SERPL-MCNC: 1 MG/DL (ref 0.5–1.4)
EAG (SMH): 126 MG/DL (ref 68–131)
ERYTHROCYTE [DISTWIDTH] IN BLOOD BY AUTOMATED COUNT: 13.2 % (ref 11.5–14.5)
GFR SERPLBLD CREATININE-BSD FMLA CKD-EPI: >60 ML/MIN/1.73/M2
GLUCOSE SERPL-MCNC: 93 MG/DL (ref 70–110)
HBA1C MFR BLD: 6 % (ref 4.5–6.2)
HCT VFR BLD AUTO: 45 % (ref 37–48.5)
HDLC SERPL-MCNC: 55 MG/DL (ref 40–75)
HDLC SERPL: 23.9 % (ref 20–50)
HGB BLD-MCNC: 14.5 GM/DL (ref 12–16)
IMM GRANULOCYTES # BLD AUTO: 0.01 K/UL (ref 0–0.04)
IMM GRANULOCYTES NFR BLD AUTO: 0.2 % (ref 0–0.5)
LDLC SERPL CALC-MCNC: 148 MG/DL (ref 63–159)
LYMPHOCYTES # BLD AUTO: 1.93 K/UL (ref 1–4.8)
MCH RBC QN AUTO: 27.9 PG (ref 27–31)
MCHC RBC AUTO-ENTMCNC: 32.2 G/DL (ref 32–36)
MCV RBC AUTO: 87 FL (ref 82–98)
NONHDLC SERPL-MCNC: 175 MG/DL
NUCLEATED RBC (/100WBC) (SMH): 0 /100 WBC
PLATELET # BLD AUTO: 318 K/UL (ref 150–450)
PMV BLD AUTO: 9.2 FL (ref 9.2–12.9)
POTASSIUM SERPL-SCNC: 4 MMOL/L (ref 3.5–5.1)
PROT SERPL-MCNC: 7.6 GM/DL (ref 6–8.4)
RBC # BLD AUTO: 5.2 M/UL (ref 4–5.4)
RELATIVE EOSINOPHIL (SMH): 1.3 % (ref 0–8)
RELATIVE LYMPHOCYTE (SMH): 30.9 % (ref 18–48)
RELATIVE MONOCYTE (SMH): 7.8 % (ref 4–15)
RELATIVE NEUTROPHIL (SMH): 59 % (ref 38–73)
SODIUM SERPL-SCNC: 141 MMOL/L (ref 136–145)
TRIGL SERPL-MCNC: 135 MG/DL (ref 30–150)
TSH SERPL-ACNC: 0.92 UIU/ML (ref 0.34–5.6)
WBC # BLD AUTO: 6.25 K/UL (ref 3.9–12.7)

## 2025-08-18 PROCEDURE — 3008F BODY MASS INDEX DOCD: CPT | Mod: CPTII,S$GLB,, | Performed by: NURSE PRACTITIONER

## 2025-08-18 PROCEDURE — 85025 COMPLETE CBC W/AUTO DIFF WBC: CPT

## 2025-08-18 PROCEDURE — 3078F DIAST BP <80 MM HG: CPT | Mod: CPTII,S$GLB,, | Performed by: NURSE PRACTITIONER

## 2025-08-18 PROCEDURE — 3074F SYST BP LT 130 MM HG: CPT | Mod: CPTII,S$GLB,, | Performed by: NURSE PRACTITIONER

## 2025-08-18 PROCEDURE — 84075 ASSAY ALKALINE PHOSPHATASE: CPT

## 2025-08-18 PROCEDURE — 83718 ASSAY OF LIPOPROTEIN: CPT

## 2025-08-18 PROCEDURE — 83036 HEMOGLOBIN GLYCOSYLATED A1C: CPT

## 2025-08-18 PROCEDURE — 36415 COLL VENOUS BLD VENIPUNCTURE: CPT

## 2025-08-18 PROCEDURE — 84443 ASSAY THYROID STIM HORMONE: CPT

## 2025-08-18 PROCEDURE — 1159F MED LIST DOCD IN RCRD: CPT | Mod: CPTII,S$GLB,, | Performed by: NURSE PRACTITIONER

## 2025-08-18 PROCEDURE — 99214 OFFICE O/P EST MOD 30 MIN: CPT | Mod: S$GLB,,, | Performed by: NURSE PRACTITIONER

## 2025-08-18 PROCEDURE — 99999 PR PBB SHADOW E&M-EST. PATIENT-LVL III: CPT | Mod: PBBFAC,,, | Performed by: NURSE PRACTITIONER

## 2025-08-18 RX ORDER — ZOLPIDEM TARTRATE 10 MG/1
10 TABLET ORAL NIGHTLY
Status: CANCELLED | OUTPATIENT
Start: 2025-08-18

## 2025-08-18 RX ORDER — LISINOPRIL 10 MG/1
10 TABLET ORAL DAILY
Qty: 90 TABLET | Refills: 1 | Status: SHIPPED | OUTPATIENT
Start: 2025-08-18

## 2025-08-18 RX ORDER — DULOXETIN HYDROCHLORIDE 30 MG/1
30 CAPSULE, DELAYED RELEASE ORAL DAILY
Qty: 30 CAPSULE | Refills: 11 | Status: SHIPPED | OUTPATIENT
Start: 2025-08-18 | End: 2026-08-18

## 2025-08-18 RX ORDER — NAPROXEN 500 MG/1
500 TABLET ORAL 2 TIMES DAILY PRN
Qty: 60 TABLET | Refills: 2 | Status: SHIPPED | OUTPATIENT
Start: 2025-08-18

## 2025-08-19 ENCOUNTER — HOSPITAL ENCOUNTER (OUTPATIENT)
Dept: RADIOLOGY | Facility: HOSPITAL | Age: 60
Discharge: HOME | End: 2025-08-19
Attending: FAMILY MEDICINE
Payer: COMMERCIAL

## 2025-08-19 VITALS — HEIGHT: 65 IN | WEIGHT: 234 LBS | BODY MASS INDEX: 38.99 KG/M2

## 2025-08-19 DIAGNOSIS — Z12.31 OTHER SCREENING MAMMOGRAM: ICD-10-CM

## 2025-08-19 DIAGNOSIS — G47.00 INSOMNIA, UNSPECIFIED TYPE: Primary | ICD-10-CM

## 2025-08-19 PROCEDURE — 77063 BREAST TOMOSYNTHESIS BI: CPT | Mod: TC,PO

## 2025-08-19 PROCEDURE — 77063 BREAST TOMOSYNTHESIS BI: CPT | Mod: 26,,, | Performed by: RADIOLOGY

## 2025-08-19 PROCEDURE — 77067 SCR MAMMO BI INCL CAD: CPT | Mod: 26,,, | Performed by: RADIOLOGY

## 2025-08-19 RX ORDER — ZOLPIDEM TARTRATE 10 MG/1
10 TABLET ORAL NIGHTLY
Qty: 30 TABLET | Refills: 2 | Status: SHIPPED | OUTPATIENT
Start: 2025-08-19

## 2025-08-26 ENCOUNTER — TELEPHONE (OUTPATIENT)
Dept: FAMILY MEDICINE | Facility: CLINIC | Age: 60
End: 2025-08-26
Payer: COMMERCIAL

## (undated) DEVICE — DRAPE STERI U-SHAPED 47X51IN

## (undated) DEVICE — PIN THREADED STERILE
Type: IMPLANTABLE DEVICE | Site: KNEE | Status: NON-FUNCTIONAL
Removed: 2018-04-19

## (undated) DEVICE — SOL 9P NACL IRR PIC IL

## (undated) DEVICE — INTERPULSE SET

## (undated) DEVICE — GAUZE SPONGE BULKEE 6X6.75IN

## (undated) DEVICE — SEE MEDLINE ITEM 146292

## (undated) DEVICE — DRESSING N ADH OIL EMUL 3X8 3S

## (undated) DEVICE — SOL IRR NACL .9% 3000ML

## (undated) DEVICE — GLOVE SURG ULTRA TOUCH 8

## (undated) DEVICE — PACK LOWER EXTREMITY

## (undated) DEVICE — DRAPE STERI INSTRUMENT 1018

## (undated) DEVICE — SEE MEDLINE ITEM 157131

## (undated) DEVICE — UNDERGLOVES BIOGEL PI SIZE 8.5

## (undated) DEVICE — SUT CTD VICRYL CT-1 27

## (undated) DEVICE — BLADE SAW SGTL 21.2X85.5X1.2

## (undated) DEVICE — BANDAGE ESMARK 6X12

## (undated) DEVICE — SEE MEDLINE ITEM 107746

## (undated) DEVICE — TOURNIQUET SB QC DP 34X4IN

## (undated) DEVICE — ELECTRODE REM PLYHSV RETURN 9

## (undated) DEVICE — STRAP OR TABLE 5IN X 72IN

## (undated) DEVICE — SEE MEDLINE ITEM 152530

## (undated) DEVICE — PIN THREADED STERILE
Type: IMPLANTABLE DEVICE | Site: KNEE | Status: NON-FUNCTIONAL
Removed: 2018-10-04

## (undated) DEVICE — PAD ABDOMINAL 5X9 STERILE

## (undated) DEVICE — SUT #2 TI-CRON HGS-21 30IN

## (undated) DEVICE — SEE MEDLINE ITEM 157166

## (undated) DEVICE — PACK CUSTOM UNIV BASIN SLI

## (undated) DEVICE — ALCOHOL 70% ISOP RUBBING 4OZ

## (undated) DEVICE — Device

## (undated) DEVICE — BOWL MIXING BONE CEMENT

## (undated) DEVICE — SEE MEDLINE ITEM 146231

## (undated) DEVICE — SUT 2-0 VICRYL / CT-1

## (undated) DEVICE — PAD ABD 8X10 STERILE

## (undated) DEVICE — DRAPE INCISE IOBAN 2 23X17IN

## (undated) DEVICE — BLADE SURG CARBON STEEL #10

## (undated) DEVICE — UNDERGLOVES BIOGEL PI SIZE 8

## (undated) DEVICE — SEE MEDLINE ITEM 146271

## (undated) DEVICE — HEMOSTAT SURGICEL 2X14IN

## (undated) DEVICE — BLADE SAW SGTL DL STR 25X1.27X

## (undated) DEVICE — KNEE IMMB UNV TRIPNL 24IN

## (undated) DEVICE — PIN TEMPORARY
Type: IMPLANTABLE DEVICE | Site: KNEE | Status: NON-FUNCTIONAL
Removed: 2018-04-19

## (undated) DEVICE — APPLICATOR CHLORAPREP ORN 26ML

## (undated) DEVICE — PACK BASIC

## (undated) DEVICE — DRAIN SINGLE ROUND 3/16 15F

## (undated) DEVICE — HEMOSTAT SURGICEL 4X8IN

## (undated) DEVICE — SEE MEDLINE ITEM 152622

## (undated) DEVICE — KIT AUTO TRANSF 800ML RESVR

## (undated) DEVICE — STAPLER SKIN ROTATING HEAD

## (undated) DEVICE — PADDING CAST SPECIALIST 6X4YD

## (undated) DEVICE — SPACESUIT TOGA T5 ZIPPER PEEL

## (undated) DEVICE — PAD CAST SPECIALIST STRL 6